# Patient Record
Sex: FEMALE | Race: WHITE | NOT HISPANIC OR LATINO | Employment: FULL TIME | URBAN - METROPOLITAN AREA
[De-identification: names, ages, dates, MRNs, and addresses within clinical notes are randomized per-mention and may not be internally consistent; named-entity substitution may affect disease eponyms.]

---

## 2017-01-03 ENCOUNTER — OFFICE VISIT (OUTPATIENT)
Dept: OBSTETRICS AND GYNECOLOGY | Facility: CLINIC | Age: 37
End: 2017-01-03
Payer: COMMERCIAL

## 2017-01-03 ENCOUNTER — HOSPITAL ENCOUNTER (OUTPATIENT)
Dept: RADIOLOGY | Facility: HOSPITAL | Age: 37
Discharge: HOME | End: 2017-01-03
Attending: PSYCHIATRY & NEUROLOGY
Payer: COMMERCIAL

## 2017-01-03 VITALS
BODY MASS INDEX: 32.19 KG/M2 | DIASTOLIC BLOOD PRESSURE: 68 MMHG | WEIGHT: 224.88 LBS | SYSTOLIC BLOOD PRESSURE: 118 MMHG | HEIGHT: 70 IN

## 2017-01-03 DIAGNOSIS — Z86.69 HISTORY OF EPILEPSY: ICD-10-CM

## 2017-01-03 DIAGNOSIS — Z01.419 VISIT FOR GYNECOLOGIC EXAMINATION: Primary | ICD-10-CM

## 2017-01-03 PROCEDURE — 88175 CYTOPATH C/V AUTO FLUID REDO: CPT | Performed by: PATHOLOGY

## 2017-01-03 PROCEDURE — 70551 MRI BRAIN STEM W/O DYE: CPT | Mod: TC

## 2017-01-03 PROCEDURE — 99999 PR PBB SHADOW E&M-EST. PATIENT-LVL III: CPT | Mod: PBBFAC,,, | Performed by: NURSE PRACTITIONER

## 2017-01-03 PROCEDURE — 88141 CYTOPATH C/V INTERPRET: CPT | Mod: ,,, | Performed by: PATHOLOGY

## 2017-01-03 PROCEDURE — 70551 MRI BRAIN STEM W/O DYE: CPT | Mod: 26,,, | Performed by: RADIOLOGY

## 2017-01-03 PROCEDURE — 99385 PREV VISIT NEW AGE 18-39: CPT | Mod: S$GLB,,, | Performed by: NURSE PRACTITIONER

## 2017-01-03 NOTE — LETTER
January 3, 2017      Stefany Worley MD  [address]  [facility] [address]           [facility] 5th Floor  [address]  [facility] [address]  Phone: [phone]          Patient: Ruma Guillen   MR Number: [id number]   YOB: 1980   Date of Visit: 1/3/2017       Dear Dr. Stefany Worley:    Thank you for referring Ruma Guillen to me for evaluation. Attached you will find relevant portions of my assessment and plan of care.    If you have questions, please do not hesitate to call me. I look forward to following Ruma Guillen along with you.    Sincerely,    RADHIKA Trivedi, QUINN    Enclosure  CC:  No Recipients    If you would like to receive this communication electronically, please contact [email or web address] or [phone] to request more information on [facility] Link access.    For providers and/or their staff who would like to refer a patient to [facility], please contact us through our one-stop-shop provider referral line, Marilyn Dasilva, at [phone].    If you feel you have received this communication in error or would no longer like to receive these types of communications, please e-mail [email or web address]

## 2017-01-03 NOTE — PROGRESS NOTES
"HISTORY OF PRESENT ILLNESS:    Ruma Guillen is a 36 y.o. female, P0., Patient's last menstrual period was 12/24/2016 (exact date).,  presents for a routine exam and has no gyn complaints.  -Had lost 65#, eating healthy, going to the gym, but over last year has been stressed with 's pancreas-kidney transplant, deaths in family, job stress.   -States goal for 2017 to get back into healthy habits again.   -Not using any contraception, but "doubts can get pregnant due to 's medical conditions"; would be OK if she did.     Past Medical History   Diagnosis Date    Anxiety     Headache, migraine     KASSIDY (iron deficiency anemia)     Seizures      childhood seizures - last seizure >28 yrs.       Past Surgical History   Procedure Laterality Date    [place] tooth extraction         MEDICATIONS AND ALLERGIES:    Current Outpatient Prescriptions:     aspirin-acetaminophen-caffeine 250-250-65 mg (EXCEDRIN MIGRAINE) 250-250-65 mg per tablet, Take 1 tablet by mouth every 6 (six) hours as needed for Pain., Disp: , Rfl:     cetirizine (ZYRTEC) 10 MG tablet, Take 1 tablet (10 mg total) by mouth once daily., Disp: , Rfl: 0    citalopram (CELEXA) 20 MG tablet, Take 1 tablet (20 mg total) by mouth once daily. Take 20mg tabs and 10mg tabs daily., Disp: 90 tablet, Rfl: 3    eszopiclone (LUNESTA) 1 MG Tab, Take 1 tablet (1 mg total) by mouth nightly as needed., Disp: 30 tablet, Rfl: 0    ferrous sulfate 325 mg (65 mg iron) Tab tablet, Take 1 tablet (325 mg total) by mouth once daily., Disp: 90 tablet, Rfl: 3    meclizine (ANTIVERT) 25 mg tablet, Take 1 tablet (25 mg total) by mouth 3 (three) times daily as needed for Dizziness., Disp: 21 tablet, Rfl: 0    Review of patient's allergies indicates:  No Known Allergies    Family History   Problem Relation Age of Onset    Arthritis Mother     Irritable bowel syndrome Mother     Heart disease Maternal Grandfather     Breast cancer Neg Hx     Colon cancer Neg Hx     " "Ovarian cancer Neg Hx        Social History     Social History    Marital status:      Spouse name: N/A    Number of children: N/A    Years of education: N/A     Occupational History          checks food safety - pesticides, medical marijuana     Social History Main Topics    Smoking status: Former Smoker    Smokeless tobacco: Not on file      Comment: monthly - 2-3 cigarettes    Alcohol use Yes      Comment: occasionally    Drug use: No    Sexual activity: Yes     Birth control/ protection: None     Other Topics Concern    Not on file     Social History Narrative     -  with pancreas - kidney transplant 2016       OB HISTORY: None.     COMPREHENSIVE GYN HISTORY:  PAP History: Denies abnormal Paps. 2013  Infection History: Denies STDs. Denies PID.  Benign History: Denies uterine fibroids. Denies ovarian cysts. Denies endometriosis. Denies other conditions.  Cancer History: Denies cervical cancer. Denies uterine cancer or hyperplasia. Denies ovarian cancer. Denies vulvar cancer or pre-cancer. Denies vaginal cancer or pre-cancer. Denies breast cancer. Denies colon cancer.  Sexual Activity History: Reports currently being sexually active  Menstrual History: Monthly. Mod then light flow.   Dysmenorrhea History: Reports mild dysmenorrhea.   Contraception: None. SEE HPI.     ROS:  GENERAL: + WT GAIN. No swelling. No fatigue. No fever.  CARDIOVASCULAR: No chest pain. No shortness of breath. No leg cramps.   NEUROLOGICAL: No headaches. No vision changes.  BREASTS: No pain. No lumps. No discharge.  ABDOMEN: No pain. No nausea. No vomiting. No diarrhea. No constipation.  REPRODUCTIVE: No abnormal bleeding.   VULVA: No pain. No lesions. No itching.  VAGINA: No relaxation. No itching. No odor. No discharge. No lesions.  URINARY: No incontinence. No nocturia. No frequency. No dysuria.    Visit Vitals    /68    Ht 5' 10" (1.778 m)    Wt 102 kg (224 lb 13.9 oz)    LMP 12/24/2016 " (Exact Date)    BMI 32.27 kg/m2       PE:  APPEARANCE: Well nourished, well developed, in no acute distress.  AFFECT: WNL, alert and oriented x 3.  SKIN: No acne or hirsutism.  NECK: Neck symmetric, without masses or thyromegaly.  NODES: No inguinal, cervical, axillary or femoral lymph node enlargement.  CHEST: Good respiratory effort.   ABDOMEN: OBESE. Soft. No tenderness or masses. No hepatosplenomegaly. No hernias.  BREASTS: Symmetrical, no skin changes, visible lesions, palpable masses or nipple discharge bilaterally.  PELVIC: External female genitalia without lesions.  Female hair distribution. Adequate perineal body, Normal urethral meatus. Vagina moist and well rugated without lesions or discharge.  No significant cystocele or rectocele present. Cervix pink without lesions, discharge or tenderness. Uterus is 4-6 week size, regular, mobile and nontender. Adnexa without masses or tenderness. EXAM DIFFICULT DUE TO BODY HABITUS.  EXTREMITIES: No edema    DIAGNOSIS:  1. Visit for gynecologic examination        PLAN:    Orders Placed This Encounter    Liquid-based pap smear, screening   Declined STD tests    COUNSELING:  The patient was counseled today on:  -to start PNV with folic acid;  -A.C.S. Pap and pelvic exam guidelines (pap every 3 years);  -to follow up with her PCP for other health maintenance.    FOLLOW-UP with me annually.

## 2017-01-19 ENCOUNTER — HOSPITAL ENCOUNTER (OUTPATIENT)
Dept: NEUROLOGY | Facility: CLINIC | Age: 37
Discharge: HOME | End: 2017-01-19
Payer: COMMERCIAL

## 2017-01-19 DIAGNOSIS — Z86.69 HISTORY OF EPILEPSY: ICD-10-CM

## 2017-01-19 PROCEDURE — 95813 EEG EXTND MNTR 61-119 MIN: CPT | Mod: S$GLB,,, | Performed by: PSYCHIATRY & NEUROLOGY

## 2017-01-19 PROCEDURE — 95813 PR EEG, EXTENDED, 61-119 MINS: ICD-10-PCS | Mod: S$GLB,,, | Performed by: PSYCHIATRY & NEUROLOGY

## 2017-01-19 NOTE — PROCEDURES
DATE OF SERVICE:  01/19/2017    ELECTROENCEPHALOGRAM REPORT  Extended Recording    METHODOLOGY:  Electroencephalographic (EEG) is recorded with electrodes placed   according to the International 10-20 placement system.  Thirty two (32) channels   of digital signal (sampling rate of 512/sec), including T1 and T2, were   simultaneously recorded from the scalp and may include EKG, EMG, and/or eye   monitors.  Recording band pass was 0.1 to 512 Hz.  Digital video recording of   the patient is simultaneously recorded with the EEG.  The patient is instructed   to report clinical symptoms which may occur during the recording session.  EEG   and video recording are stored and archived in digital format.  Activation   procedures, which include photic stimulation, hyperventilation and instructing   patients to perform simple tasks, are done in selected patients.    The EEG is displayed on a monitor screen and can be reviewed using different   montages.  Computer-assisted analysis is employed to detect spike and   electrographic seizure activity.  The entire record is submitted for computer   analysis.  The entire recording is visually reviewed, and the times identified   by computer analysis as being spikes or seizures are reviewed again.    Compressed spectral analysis (CSA) is also performed on the activity recorded   from each individual channel.  This is displayed as a power display of   frequencies from 0 to 30 Hz over time.  The CSA is reviewed looking for   asymmetries in power between homologous areas of the scalp, then compared with   the original EEG recording.    ABPathfinder software was also utilized in the review of this study.  This software   suite analyzes the EEG recording in multiple domains.  Coherence and rhythmicity   are computed to identify EEG sections which may contain organized seizures.    Each channel undergoes analysis to detect the presence of spike and sharp waves   which have special and  morphological characteristics of epileptic activity.  The   routine EEG recording is converted from special into frequency domain.  This is   then displayed comparing homologous areas to identify areas of significant   asymmetry.  Algorithm to identify non-cortically generated artifact is used to   separate artifact from the EEG.    RECORDING TIMES:  The duration of the study is 1 hour 1 minute.    EEG FINDINGS:  The background of this study at onset contains a well-formed 11   Hz posterior dominant rhythm seen best in the occipital channels along with a   mix of beta activity seen both anteriorly and posteriorly.  The patient is awake   initially and photic stimulation is performed revealing no noticeable driving   response.  Hyperventilation is then performed, which reveals mild physiologic   slowing only.  The record then continues and the patient gradually becomes   drowsy with the emergence of a few sleep spindles signifying brief stage N2   sleep before the patient arouses.  The patient then waxes and wanes in and out   of light sleep for the next several minutes and several minutes of stage N2   sleep are captured.    The patient remains asleep for several minutes before arousing in the final   quarter of the EEG with the return to the prior baseline of a mix of alpha and   beta activity in the posterior dominant rhythm.    No epileptiform discharges were seen during this study.  No electrographic   seizures were seen.  No focal findings or asymmetries were seen.  No events were   flagged on the accompanying video.    INTERPRETATION:  Normal awake and asleep extended routine EEG.      NBB/HN  dd: 01/19/2017 12:00:39 (CST)  td: 01/19/2017 12:17:40 (CST)  Doc ID   #[id number]  Job ID #[id number]    CC:

## 2017-02-23 ENCOUNTER — OFFICE VISIT (OUTPATIENT)
Dept: NEUROLOGY | Facility: CLINIC | Age: 37
End: 2017-02-23
Payer: COMMERCIAL

## 2017-02-23 VITALS
HEART RATE: 60 BPM | SYSTOLIC BLOOD PRESSURE: 105 MMHG | HEIGHT: 70 IN | DIASTOLIC BLOOD PRESSURE: 69 MMHG | BODY MASS INDEX: 30.99 KG/M2 | WEIGHT: 216.5 LBS

## 2017-02-23 DIAGNOSIS — G43.C0 PERIODIC HEADACHE SYNDROME, NOT INTRACTABLE: Primary | ICD-10-CM

## 2017-02-23 PROCEDURE — 99213 OFFICE O/P EST LOW 20 MIN: CPT | Mod: S$GLB,,, | Performed by: PSYCHIATRY & NEUROLOGY

## 2017-02-23 PROCEDURE — 1160F RVW MEDS BY RX/DR IN RCRD: CPT | Mod: S$GLB,,, | Performed by: PSYCHIATRY & NEUROLOGY

## 2017-02-23 PROCEDURE — 99999 PR PBB SHADOW E&M-EST. PATIENT-LVL III: CPT | Mod: PBBFAC,,, | Performed by: PSYCHIATRY & NEUROLOGY

## 2017-02-23 NOTE — LETTER
February 23, 2017      Stefany Worley MD  [address]  [facility] [address]           [facility]  [address]  [facility] [address]  Phone: [phone]  Fax: [phone]          Patient: Ruma Guillen   MR Number: [id number]   YOB: 1980   Date of Visit: 2/23/2017       Dear Dr. Stefany Worley:    Thank you for referring Ruma Guillen to me for evaluation. Attached you will find relevant portions of my assessment and plan of care.    If you have questions, please do not hesitate to call me. I look forward to following Ruma Guillen along with you.    Sincerely,    Wilbur Floyd MD    Enclosure  CC:  No Recipients    If you would like to receive this communication electronically, please contact [email or web address] or [phone] to request more information on [employer] Link access.    For providers and/or their staff who would like to refer a patient to [facility], please contact us through our one-stop-shop provider referral line, [facility], at [phone].    If you feel you have received this communication in error or would no longer like to receive these types of communications, please e-mail [email or web address]

## 2017-02-23 NOTE — MR AVS SNAPSHOT
"    [facility] - Neurology  [address]  [facility] [address]  Phone: [phone]  Fax: [phone]                  Ruma Guillen   2017 9:00 AM   Office Visit    Description:  Female : 1980   Provider:  Wilbur Floyd MD   Department:  [facility]           Reason for Visit     Follow-up           Diagnoses this Visit        Comments    Periodic headache syndrome, not intractable    -  Primary            To Do List           Future Appointments        Provider Department Dept Phone    3/30/2017 8:40 AM Stefany Worley MD [facility] - Internal Medicine [phone]      Goals (5 Years of Data)     None      [facility]     [facility] On [facility] Nurse Care Line -  Assistance  Registered nurses in the [facility] On Call Center provide clinical advisement, health education, appointment booking, and other advisory services.  Call for this free service at [phone].             Medications                Verify that the below list of medications is an accurate representation of the medications you are currently taking.  If none reported, the list may be blank. If incorrect, please contact your healthcare provider. Carry this list with you in case of emergency.           Current Medications     aspirin-acetaminophen-caffeine 250-250-65 mg (EXCEDRIN MIGRAINE) 250-250-65 mg per tablet Take 1 tablet by mouth every 6 (six) hours as needed for Pain.    cetirizine (ZYRTEC) 10 MG tablet Take 1 tablet (10 mg total) by mouth once daily.    citalopram (CELEXA) 20 MG tablet Take 1 tablet (20 mg total) by mouth once daily. Take 20mg tabs and 10mg tabs daily.    ferrous sulfate 325 mg (65 mg iron) Tab tablet Take 1 tablet (325 mg total) by mouth once daily.    meclizine (ANTIVERT) 25 mg tablet Take 1 tablet (25 mg total) by mouth 3 (three) times daily as needed for Dizziness.           Clinical Reference Information           Your Vitals Were     BP Pulse Height Weight BMI    105/69 60 5' 10" (1.778 m) " 98.2 kg (216 lb 7.9 oz) 31.06 kg/m2      Blood Pressure          Most Recent Value    BP  105/69      Allergies as of 2/23/2017     No Known Allergies      Immunizations Administered on Date of Encounter - 2/23/2017     None      Language Assistance Services     ATTENTION: Language assistance services are available, free of charge. Please call [phone].      ATENCIÓN: Si habla español, tiene a zapien disposición servicios gratuitos de asistencia lingüística. Llame al [phone].     CHÚ Ý: N?u b?n nói Ti?ng Vi?t, có các d?ch v? h? tr? ngôn ng? mi?n phí dành cho b?n. G?i s? [phone].         [facility] complies with applicable Federal civil rights laws and does not discriminate on the basis of race, color, national origin, age, disability, or sex.

## 2017-02-23 NOTE — PROGRESS NOTES
Interval History (2/23/17):  Patient reports that she has been doing well since she was last seen. In December, she was routinely waking up at 2 am and was not able to go back to sleep until 5; she reports that she has been sleeping much better lately. She tried taking the Melatonin once or twice, but was worried it would cause her to sleep through her morning alarm, so she stopped taking it. She has begun an exercise regimen in the evenings which helps her get to sleep. Her  is doing well after his transplant and the stress in her life is well controlled at this point. She has not had any episodes of dizziness/vertigo since she was last evaluated. She also has not had the previously mentioned lightheadedness or foggy thinking for the past month. She has a mild headache once per week and a migraine once every 3-6 months. She has transient vision changes as part of her aura with her migraines; this entails decreased peripheral vision and a flashing light in her right eye. Headaches are associated with nausea, photophobia, and phonophobia. Excedrin PRN continues to alleviate her migraine symptoms.    Patient denies any changes to her past medical or surgical history, family history, social history, allergies, or home medications.    HPI (12/20/2016):   36 year old woman with history of childhood epilepsy presents for vertigo. She reports that when she was [age] years old she slipped and hit her head on a sink prompting neurologic work up that showed petit mal seizures. She had staring spells for about two years which were not treated with any medication. She began to have migraines later in childhood around 12 years of age but these have become much less frequent in adulthood. She estimates they now occur about once every two months with good response to excedrine migraine. Some of these are accompanied by about 10 minutes of right sided face/hand numbness and right visual deficit prior to headache.     Over  the past year she has had occasional bouts of vertigo (room spinning) with nausea on awakening in the morning - happens about once every two months. She has more frequent prolonged feeling of lightheadedness and foggy thinking throughout the day which troubles her as she has a cognitively demanding job as a . She does have significant recent stressor of  receiving organ transplant 7/2016 but reports that his health is generally going well and she does not feel stressed. She does endorse trouble maintaining sleep and was recently prescribed lunesta by PCP but has not tried this yet.     Review of Systems   Constitutional: Negative for chills, fever and weight loss.   HENT: Positive for tinnitus. Negative for congestion, hearing loss and sore throat.    Eyes: Negative for blurred vision and double vision.   Respiratory: Negative for cough, shortness of breath and wheezing.    Cardiovascular: Negative for chest pain, palpitations and leg swelling.   Gastrointestinal: Negative for abdominal pain, blood in stool, constipation, diarrhea, nausea and vomiting.   Genitourinary: Negative for dysuria and hematuria.   Musculoskeletal: Negative for back pain, joint pain and neck pain.   Skin: Negative for rash.   Neurological: Negative for sensory change, speech change, focal weakness, seizures, loss of consciousness and headaches (one every 3-6 months).   Endo/Heme/Allergies: Does not bruise/bleed easily.   Psychiatric/Behavioral: Negative for depression, memory loss and substance abuse. The patient is nervous/anxious. The patient does not have insomnia.      Objective:   Neurologic Exam      Mental Status   Oriented to person, place, and time.   Level of consciousness: alert     Cranial Nerves      CN II   Visual fields intact to finger count in all four quadrants bilaterally.     CN III, IV, VI   Pupils are equal, round, and reactive to light.  Extraocular motions are normal.      CN V   Facial sensation intact  bilaterally.      CN VII   Facial expression full, symmetric.      CN IX, X   Palate: symmetric     CN XI   Right trapezius strength: normal  Left trapezius strength: normal  Head turn 5/5 bilaterally.     CN XII   Tongue: not atrophic, midline     Motor Exam   Muscle bulk: normal  Overall muscle tone: normal     Strength   Right deltoid: 5/5  Left deltoid: 5/5  Right biceps: 5/5  Left biceps: 5/5  Right triceps: 5/5  Left triceps: 5/5  Right interossei: 5/5  Left interossei: 5/5  Right iliopsoas: 5/5  Left iliopsoas: 5/5  Right quadriceps: 5/5  Left quadriceps: 5/5  Right hamstrin/5  Left hamstrin/5  Right anterior tibial: 5/5  Left anterior tibial: 5/5  Right gastroc: 5/5  Left gastroc: 5/5     Sensory Exam   Light touch normal throughout bilateral upper and lower extremities.  Vibration normal throughout bilateral upper and lower extremities.  Temperature normal throughout bilateral upper and lower extremities.     Gait, Coordination, and Reflexes   Gait: Normal. Able to walk on heels and toes without difficulty.     Coordination   Romberg: negative  Finger to nose coordination: normal  Heel to shin coordination: normal  Tandem walking coordination: normal     Tremor   Resting tremor: absent     Reflexes   Right brachioradialis: 2+  Left brachioradialis: 2+  Right biceps: 2+  Left biceps: 2+  Right triceps: 2+  Left triceps: 2+  Right patellar: 2+  Left patellar: 2+  Right achilles: 2+  Left achilles: 2+  Rogers's not present bilaterally. Toes are down-going bilaterally.      Physical Exam   Vitals:    17 0900   BP: 105/69   Pulse: 60     Constitutional: She is oriented to person, place, and time. She appears well-developed and well-nourished.   HENT: Normocephalic and atraumatic. EOM are normal. Pupils are equal, round, and reactive to light. Mucous membranes moist.  Cardiovascular: Normal rate and regular rhythm.  Pulmonary/Chest: Effort normal. Clear to auscultation bilaterally.  Abdominal:  Soft. Non-tender, non-distended.  Musculoskeletal: Normal range of motion.   Extremities: No cyanosis, edema. Extremities are well perfused.  Skin: Skin is warm and dry.   Psychiatric: She has a normal mood and affect.      Assessment:      36 year old  female with a history of childhood epilepsy and migraines who presents in follow up for vertigo and concerns about recurrence of childhood epilepsy.      Plan:      -MRI brain epilepsy protocol was within normal limits, as was extended EEG.  -Given symptoms of hemiplegic migraine, best to avoid triptans for abortive therapy, continue Excedrin migraine.  -Patient reports that lightheadedness and dizziness have resolved.   -Continue exercise regimen. Melatonin for insomnia as needed.  -Return to clinic on an as needed basis.    Patient was seen and discussed with Dr. Floyd.    Sara Young MD  Neurology, PGY-II

## 2017-03-02 ENCOUNTER — PATIENT MESSAGE (OUTPATIENT)
Dept: INTERNAL MEDICINE | Facility: CLINIC | Age: 37
End: 2017-03-02

## 2017-03-02 RX ORDER — ALPRAZOLAM 0.25 MG/1
0.25 TABLET ORAL 2 TIMES DAILY PRN
Qty: 8 TABLET | Refills: 0 | Status: SHIPPED | OUTPATIENT
Start: 2017-03-02 | End: 2017-05-25 | Stop reason: SDUPTHER

## 2017-05-25 ENCOUNTER — OFFICE VISIT (OUTPATIENT)
Dept: INTERNAL MEDICINE | Facility: CLINIC | Age: 37
End: 2017-05-25
Payer: COMMERCIAL

## 2017-05-25 VITALS
DIASTOLIC BLOOD PRESSURE: 64 MMHG | TEMPERATURE: 99 F | SYSTOLIC BLOOD PRESSURE: 104 MMHG | RESPIRATION RATE: 17 BRPM | HEIGHT: 70 IN | BODY MASS INDEX: 30.78 KG/M2 | WEIGHT: 215 LBS | HEART RATE: 61 BPM

## 2017-05-25 DIAGNOSIS — F41.9 ANXIETY: Primary | ICD-10-CM

## 2017-05-25 DIAGNOSIS — D50.9 IRON DEFICIENCY ANEMIA, UNSPECIFIED IRON DEFICIENCY ANEMIA TYPE: ICD-10-CM

## 2017-05-25 PROCEDURE — 99999 PR PBB SHADOW E&M-EST. PATIENT-LVL III: CPT | Mod: PBBFAC,,, | Performed by: INTERNAL MEDICINE

## 2017-05-25 PROCEDURE — 99214 OFFICE O/P EST MOD 30 MIN: CPT | Mod: S$GLB,,, | Performed by: INTERNAL MEDICINE

## 2017-05-25 RX ORDER — ALPRAZOLAM 0.25 MG/1
0.25 TABLET ORAL 2 TIMES DAILY PRN
Qty: 12 TABLET | Refills: 1 | Status: SHIPPED | OUTPATIENT
Start: 2017-05-25 | End: 2017-05-25 | Stop reason: SDUPTHER

## 2017-05-25 RX ORDER — ALPRAZOLAM 0.25 MG/1
0.25 TABLET ORAL 2 TIMES DAILY PRN
Qty: 30 TABLET | Refills: 1 | Status: SHIPPED | OUTPATIENT
Start: 2017-05-25 | End: 2018-01-11 | Stop reason: SDUPTHER

## 2017-05-25 RX ORDER — CITALOPRAM 20 MG/1
20 TABLET, FILM COATED ORAL DAILY
Qty: 90 TABLET | Refills: 3
Start: 2017-05-25 | End: 2018-01-11 | Stop reason: SDUPTHER

## 2017-05-25 NOTE — PROGRESS NOTES
"Subjective:       Patient ID: Ruma Guillen is a 36 y.o. female.    Chief Complaint: follow up for anxiety and amnesia    HPI   Dizziness resolved. Seen neurology - neg EEG and MRI. Recommended Excedrin migraine and no triptans.   HAs attributed to sinuses. Takes tylenol and zyrtec daily. No sinus congestion/rhinorrhea/sore throat/fevers/chills currently. Excedrin migraine works well for her HA.    Anxiety - on celexa 20mg daily (no improvement between 20mg and 30mg so on 20mg) and prn xanax. Reports still sometimes w/ panic attacks - not necessarily w/ associated reason; happened once in a meeting. Xanax works for panic attacks.     Had GE yesterday. No longer w/ diarrhea/vomiting/nausea. Tolerating PO.     Going to [employer] soon.    Review of Systems  as above in HPI.     Objective:      Physical Exam    /64   Pulse 61   Temp 98.6 °F (37 °C) (Oral)   Resp 17   Ht 5' 10" (1.778 m)   Wt 97.5 kg (215 lb)   LMP 05/05/2017 (Approximate)   BMI 30.85 kg/m²     GEN - A+OX4, NAD   HEENT - PERRL, EOMI, OP clear. MMM.   Neck - No thyromegaly or cervical LAD. No thyroid masses felt.  CV - RRR, no m/r   Chest - CTAB, no wheezing or rhonchi  Abd - S/NT/ND/+BS.   Ext - 2+BDP and radial pulses. No LE edema.   Neuro - 5/5 BUE and BLE strength. 2+ DTRs.  Skin - No rash.    MRI BRAIN 1/3/17  Findings:      The craniocervical junction is within normal limits.  The sella and parasellar structures are unremarkable.  The midline structures are within normal limits.  The orbits and intraorbital contents are within normal limits.  The paranasal sinuses and mastoid air cells are clear.    No diffusion-weighted signal abnormality is present.  There is no focal parenchymal signal abnormality.  The hippocampi formations are within normal limits.  There is no evidence of heterotopia.  There is no evidence of midline shift.  There are no extra-axial fluid collections.  There is no evidence of intracranial hemorrhage. "   Impression   Unremarkable MRI of the brain using epilepsy protocol.       Assessment/Plan     Ruma was seen today for anxiety, insomnia, dizziness and seizures.    Diagnoses and all orders for this visit:    Anxiety  -     Comprehensive metabolic panel; Future  -     citalopram (CELEXA) 20 MG tablet; Take 1 tablet (20 mg total) by mouth once daily.  -     alprazolam (XANAX) 0.25 MG tablet; Take 1 tablet (0.25 mg total) by mouth 2 (two) times daily as needed for Anxiety.    Iron deficiency anemia, unspecified iron deficiency anemia type  -     CBC auto differential; Future  -     Ferritin; Future  -     Iron and TIBC; Future    RTC in 6 mo, sooner if needed.     Stefany Worley MD  Department of Internal Medicine - [facility]  7:43 AM

## 2017-07-10 RX ORDER — FERROUS SULFATE 325(65) MG
325 TABLET ORAL DAILY
Qty: 90 TABLET | Refills: 3 | Status: SHIPPED | OUTPATIENT
Start: 2017-07-10 | End: 2018-01-11

## 2017-08-14 DIAGNOSIS — F41.9 ANXIETY: ICD-10-CM

## 2017-08-14 RX ORDER — CITALOPRAM 20 MG/1
TABLET, FILM COATED ORAL
Qty: 90 TABLET | Refills: 1 | Status: SHIPPED | OUTPATIENT
Start: 2017-08-14 | End: 2018-01-11 | Stop reason: SDUPTHER

## 2018-01-11 ENCOUNTER — OFFICE VISIT (OUTPATIENT)
Dept: INTERNAL MEDICINE | Facility: CLINIC | Age: 38
End: 2018-01-11
Payer: COMMERCIAL

## 2018-01-11 VITALS
SYSTOLIC BLOOD PRESSURE: 122 MMHG | WEIGHT: 224.5 LBS | BODY MASS INDEX: 32.14 KG/M2 | DIASTOLIC BLOOD PRESSURE: 78 MMHG | RESPIRATION RATE: 18 BRPM | HEART RATE: 60 BPM | HEIGHT: 70 IN | TEMPERATURE: 99 F

## 2018-01-11 DIAGNOSIS — R41.840 ATTENTION DEFICIT: ICD-10-CM

## 2018-01-11 DIAGNOSIS — F41.9 ANXIETY: Primary | ICD-10-CM

## 2018-01-11 PROCEDURE — 99213 OFFICE O/P EST LOW 20 MIN: CPT | Mod: S$GLB,,, | Performed by: INTERNAL MEDICINE

## 2018-01-11 PROCEDURE — 99999 PR PBB SHADOW E&M-EST. PATIENT-LVL III: CPT | Mod: PBBFAC,,, | Performed by: INTERNAL MEDICINE

## 2018-01-11 RX ORDER — BUPROPION HYDROCHLORIDE 75 MG/1
TABLET ORAL
Qty: 60 TABLET | Refills: 2 | Status: SHIPPED | OUTPATIENT
Start: 2018-01-11 | End: 2018-04-06 | Stop reason: SDUPTHER

## 2018-01-11 RX ORDER — CITALOPRAM 20 MG/1
20 TABLET, FILM COATED ORAL DAILY
Qty: 90 TABLET | Refills: 3 | Status: SHIPPED | OUTPATIENT
Start: 2018-01-11 | End: 2019-02-22 | Stop reason: SDUPTHER

## 2018-01-11 RX ORDER — ALPRAZOLAM 0.25 MG/1
0.25 TABLET ORAL 2 TIMES DAILY PRN
Qty: 30 TABLET | Refills: 1 | Status: SHIPPED | OUTPATIENT
Start: 2018-01-11 | End: 2019-02-22 | Stop reason: SDUPTHER

## 2018-01-11 NOTE — PROGRESS NOTES
"Subjective:       Patient ID: Ruma Guillen is a 37 y.o. female.    Chief Complaint: f/u for anxiety    HPI   Anxiety - on celexa 20mg daily and xanax prn.   Reports mood is doing fine on current dosage. Possibly new job in [place].     Reports sometimes have trouble focusing - she'll go off on tangents in mid conversations. Reports always had trouble focusing. No trouble w/ grades in college or high school as she didn't have to study much. Did have to switch from biology to chemistry b/c she was not able to focus w/ biology when reading - has to read a paragraph three times. Never been diagnosed w/ ADD. Brother w/ ADD.    Had flu vaccine through [facility] in Oct.     Review of Systems  Comprehensive review of systems otherwise negative. See history/subjective section for more details.    Objective:      Physical Exam    /78   Pulse 60   Temp 99 °F (37.2 °C) (Oral)   Resp 18   Ht 5' 10" (1.778 m)   Wt 101.8 kg (224 lb 8 oz)   LMP 01/05/2018   BMI 32.21 kg/m²     GEN - A+OX4, NAD   HEENT - PERRL, EOMI, OP clear  Neck - No thyromegaly or cervical LAD. No thyroid masses felt.  CV - RRR, no m/r   Chest - CTAB, no wheezing or rhonchi  Abd - S/NT/ND/+BS.   Ext - 2+BDP and radial pulses. No le EDEMA.  Skin - No rash.    Assessment/Plan     Diagnoses and all orders for this visit:    Anxiety  -     citalopram (CELEXA) 20 MG tablet; Take 1 tablet (20 mg total) by mouth once daily.  -     ALPRAZolam (XANAX) 0.25 MG tablet; Take 1 tablet (0.25 mg total) by mouth 2 (two) times daily as needed for Anxiety.  -     buPROPion (WELLBUTRIN) 75 MG tablet; Take 1/2 tablet twice daily for a week and then take 1 tablet twice daily onwards.    Attention deficit - Discussed w/ pt that dx of ADD would require psychiatry eval. Pt reports would be ok w/ just trying wellbutrin to see if it helps. Portal message me in 6-8 weeks and let me know and also if any issues.  -     buPROPion (WELLBUTRIN) 75 MG tablet; Take 1/2 tablet twice daily for a " week and then take 1 tablet twice daily onwards.    Possibly moving to [place] in March or April.  Follow-up if symptoms worsen or fail to improve.      Stefany Worley MD  Department of Internal Medicine - [facility]  9:02 AM

## 2018-04-06 DIAGNOSIS — R41.840 ATTENTION DEFICIT: ICD-10-CM

## 2018-04-06 DIAGNOSIS — F41.9 ANXIETY: ICD-10-CM

## 2018-04-06 RX ORDER — BUPROPION HYDROCHLORIDE 75 MG/1
TABLET ORAL
Qty: 60 TABLET | Refills: 2 | Status: SHIPPED | OUTPATIENT
Start: 2018-04-06 | End: 2018-07-04 | Stop reason: SDUPTHER

## 2018-07-04 DIAGNOSIS — R41.840 ATTENTION DEFICIT: ICD-10-CM

## 2018-07-04 DIAGNOSIS — F41.9 ANXIETY: ICD-10-CM

## 2018-07-05 RX ORDER — BUPROPION HYDROCHLORIDE 75 MG/1
TABLET ORAL
Qty: 60 TABLET | Refills: 2 | Status: SHIPPED | OUTPATIENT
Start: 2018-07-05 | End: 2019-02-22

## 2019-02-22 ENCOUNTER — LAB VISIT (OUTPATIENT)
Dept: LAB | Facility: HOSPITAL | Age: 39
End: 2019-02-22
Attending: INTERNAL MEDICINE
Payer: COMMERCIAL

## 2019-02-22 ENCOUNTER — OFFICE VISIT (OUTPATIENT)
Dept: INTERNAL MEDICINE | Facility: CLINIC | Age: 39
End: 2019-02-22
Payer: COMMERCIAL

## 2019-02-22 VITALS
HEART RATE: 67 BPM | SYSTOLIC BLOOD PRESSURE: 108 MMHG | WEIGHT: 219.81 LBS | BODY MASS INDEX: 31.47 KG/M2 | TEMPERATURE: 98 F | DIASTOLIC BLOOD PRESSURE: 68 MMHG | HEIGHT: 70 IN

## 2019-02-22 DIAGNOSIS — F41.9 ANXIETY: ICD-10-CM

## 2019-02-22 DIAGNOSIS — Z00.00 ANNUAL PHYSICAL EXAM: Primary | ICD-10-CM

## 2019-02-22 DIAGNOSIS — D50.9 IRON DEFICIENCY ANEMIA, UNSPECIFIED IRON DEFICIENCY ANEMIA TYPE: ICD-10-CM

## 2019-02-22 DIAGNOSIS — R41.840 ATTENTION DEFICIT: ICD-10-CM

## 2019-02-22 DIAGNOSIS — Z00.00 ANNUAL PHYSICAL EXAM: ICD-10-CM

## 2019-02-22 LAB
25(OH)D3+25(OH)D2 SERPL-MCNC: 30 NG/ML
ALBUMIN SERPL BCP-MCNC: 4.4 G/DL
ALP SERPL-CCNC: 62 U/L
ALT SERPL W/O P-5'-P-CCNC: 22 U/L
ANION GAP SERPL CALC-SCNC: 7 MMOL/L
AST SERPL-CCNC: 24 U/L
BASOPHILS # BLD AUTO: 0.02 K/UL
BASOPHILS NFR BLD: 0.4 %
BILIRUB SERPL-MCNC: 0.4 MG/DL
BUN SERPL-MCNC: 11 MG/DL
CALCIUM SERPL-MCNC: 9.9 MG/DL
CHLORIDE SERPL-SCNC: 102 MMOL/L
CHOLEST SERPL-MCNC: 214 MG/DL
CHOLEST/HDLC SERPL: 4.1 {RATIO}
CO2 SERPL-SCNC: 28 MMOL/L
CREAT SERPL-MCNC: 0.8 MG/DL
DIFFERENTIAL METHOD: ABNORMAL
EOSINOPHIL # BLD AUTO: 0.1 K/UL
EOSINOPHIL NFR BLD: 2.7 %
ERYTHROCYTE [DISTWIDTH] IN BLOOD BY AUTOMATED COUNT: 12.8 %
EST. GFR  (AFRICAN AMERICAN): >60 ML/MIN/1.73 M^2
EST. GFR  (NON AFRICAN AMERICAN): >60 ML/MIN/1.73 M^2
ESTIMATED AVG GLUCOSE: 94 MG/DL
FERRITIN SERPL-MCNC: 9 NG/ML
GLUCOSE SERPL-MCNC: 86 MG/DL
HBA1C MFR BLD HPLC: 4.9 %
HCT VFR BLD AUTO: 39.6 %
HDLC SERPL-MCNC: 52 MG/DL
HDLC SERPL: 24.3 %
HGB BLD-MCNC: 12.4 G/DL
IRON SERPL-MCNC: 44 UG/DL
LDLC SERPL CALC-MCNC: 148.2 MG/DL
LYMPHOCYTES # BLD AUTO: 1.3 K/UL
LYMPHOCYTES NFR BLD: 27.2 %
MCH RBC QN AUTO: 27.6 PG
MCHC RBC AUTO-ENTMCNC: 31.3 G/DL
MCV RBC AUTO: 88 FL
MONOCYTES # BLD AUTO: 0.4 K/UL
MONOCYTES NFR BLD: 8.4 %
NEUTROPHILS # BLD AUTO: 3 K/UL
NEUTROPHILS NFR BLD: 60.9 %
NONHDLC SERPL-MCNC: 162 MG/DL
PLATELET # BLD AUTO: 238 K/UL
PMV BLD AUTO: 10.7 FL
POTASSIUM SERPL-SCNC: 4.3 MMOL/L
PROT SERPL-MCNC: 7.6 G/DL
RBC # BLD AUTO: 4.49 M/UL
SATURATED IRON: 9 %
SODIUM SERPL-SCNC: 137 MMOL/L
TOTAL IRON BINDING CAPACITY: 475 UG/DL
TRANSFERRIN SERPL-MCNC: 321 MG/DL
TRIGL SERPL-MCNC: 69 MG/DL
TSH SERPL DL<=0.005 MIU/L-ACNC: 0.96 UIU/ML
WBC # BLD AUTO: 4.89 K/UL

## 2019-02-22 PROCEDURE — 83540 ASSAY OF IRON: CPT

## 2019-02-22 PROCEDURE — 99395 PR PREVENTIVE VISIT,EST,18-39: ICD-10-PCS | Mod: S$GLB,,, | Performed by: INTERNAL MEDICINE

## 2019-02-22 PROCEDURE — 99999 PR PBB SHADOW E&M-EST. PATIENT-LVL IV: ICD-10-PCS | Mod: PBBFAC,,, | Performed by: INTERNAL MEDICINE

## 2019-02-22 PROCEDURE — 80061 LIPID PANEL: CPT

## 2019-02-22 PROCEDURE — 83036 HEMOGLOBIN GLYCOSYLATED A1C: CPT

## 2019-02-22 PROCEDURE — 36415 COLL VENOUS BLD VENIPUNCTURE: CPT

## 2019-02-22 PROCEDURE — 82306 VITAMIN D 25 HYDROXY: CPT

## 2019-02-22 PROCEDURE — 80053 COMPREHEN METABOLIC PANEL: CPT

## 2019-02-22 PROCEDURE — 84443 ASSAY THYROID STIM HORMONE: CPT

## 2019-02-22 PROCEDURE — 85025 COMPLETE CBC W/AUTO DIFF WBC: CPT

## 2019-02-22 PROCEDURE — 99395 PREV VISIT EST AGE 18-39: CPT | Mod: S$GLB,,, | Performed by: INTERNAL MEDICINE

## 2019-02-22 PROCEDURE — 99999 PR PBB SHADOW E&M-EST. PATIENT-LVL IV: CPT | Mod: PBBFAC,,, | Performed by: INTERNAL MEDICINE

## 2019-02-22 PROCEDURE — 82728 ASSAY OF FERRITIN: CPT

## 2019-02-22 RX ORDER — ALPRAZOLAM 0.25 MG/1
0.25 TABLET ORAL 2 TIMES DAILY PRN
Qty: 30 TABLET | Refills: 1 | Status: SHIPPED | OUTPATIENT
Start: 2019-02-22 | End: 2019-08-16 | Stop reason: SDUPTHER

## 2019-02-22 RX ORDER — CITALOPRAM 20 MG/1
20 TABLET, FILM COATED ORAL DAILY
Qty: 90 TABLET | Refills: 3 | Status: SHIPPED | OUTPATIENT
Start: 2019-02-22 | End: 2019-12-24 | Stop reason: SDUPTHER

## 2019-02-22 NOTE — PROGRESS NOTES
INTERNAL MEDICINE ANNUAL VISIT NOTE      CHIEF COMPLAINT     Chief Complaint   Patient presents with    Annual Exam     HPI     Ruma Guillen is a 38 y.o. C female who presents for annual exam. LMP 2/12/19. Monthly. Last about 5-6 days. Very heavy for the first day or two - goes through a tampon every 2-3 hours.    Anxiety - celexa 20mg daily, prn xanax (rare use)  Stressful work.   Started working out this week.     Difficulty concentrating - affecting work where she second guesses herself. When she's at conferences or talking to someone, she phases out.     No more dizziness.     Chronic allergies - on zyrtec 10mg daily.     Past Medical History:  Past Medical History:   Diagnosis Date    Anxiety     Headache, migraine     KASSIDY (iron deficiency anemia)     Seizures     childhood seizures - last seizure >28 yrs.       Past Surgical History:  Past Surgical History:   Procedure Laterality Date    WISDOM TOOTH EXTRACTION         Allergies:  Review of patient's allergies indicates:  No Known Allergies    Home Medications:  Prior to Admission medications    Medication Sig Start Date End Date Taking? Authorizing Provider   ALPRAZolam (XANAX) 0.25 MG tablet Take 1 tablet (0.25 mg total) by mouth 2 (two) times daily as needed for Anxiety. 1/11/18 2/22/19 Yes Stefany Worley MD   aspirin-acetaminophen-caffeine 250-250-65 mg (EXCEDRIN MIGRAINE) 250-250-65 mg per tablet Take 1 tablet by mouth every 6 (six) hours as needed for Pain.   Yes Historical Provider, MD   cetirizine (ZYRTEC) 10 MG tablet Take 1 tablet (10 mg total) by mouth once daily. 12/5/16 2/22/19 Yes Stefany Worley MD   citalopram (CELEXA) 20 MG tablet Take 1 tablet (20 mg total) by mouth once daily. 1/11/18  Yes Stefany Worley MD   meclizine (ANTIVERT) 25 mg tablet Take 1 tablet (25 mg total) by mouth 3 (three) times daily as needed for Dizziness. 12/5/16  Yes Stefany Worley MD   buPROPion (WELLBUTRIN) 75 MG tablet TAKE 1/2 TABLET TWICE DAILY FOR A WEEK AND THEN TAKE 1 TABLET TWICE  "DAILY ONWARDS. 7/5/18 2/22/19  Mariaa Metcalf MD       Family History:  Family History   Problem Relation Age of Onset    Arthritis Mother     Irritable bowel syndrome Mother     Heart disease Maternal Grandfather     Breast cancer Neg Hx     Colon cancer Neg Hx     Ovarian cancer Neg Hx        Social History:  Social History     Tobacco Use    Smoking status: Former Smoker    Tobacco comment: monthly - 2-3 cigarettes   Substance Use Topics    Alcohol use: Yes     Comment: occasionally    Drug use: No       Review of Systems:  Review of Systems   Constitutional: Positive for fatigue. Negative for activity change and unexpected weight change.   HENT: Negative for hearing loss, rhinorrhea and trouble swallowing.    Eyes: Negative for discharge and visual disturbance.   Respiratory: Negative for chest tightness and wheezing.    Cardiovascular: Negative for chest pain and palpitations.   Gastrointestinal: Negative for blood in stool, constipation, diarrhea and vomiting.   Endocrine: Negative for polydipsia and polyuria.   Genitourinary: Negative for difficulty urinating, dysuria, hematuria and menstrual problem.   Musculoskeletal: Negative for arthralgias, joint swelling and neck pain.   Neurological: Negative for weakness and headaches.   Psychiatric/Behavioral: Negative for confusion and dysphoric mood.       Health Maintainence:   HM reviewed.    PHYSICAL EXAM     /68 (BP Location: Left arm, Patient Position: Sitting, BP Method: Medium (Manual))   Pulse 67   Temp 98.3 °F (36.8 °C)   Ht 5' 10" (1.778 m)   Wt 99.7 kg (219 lb 12.8 oz)   LMP 02/12/2019   BMI 31.54 kg/m²     GEN - A+OX4, NAD   HEENT - PERRL, EOMI, OP clear. MMM.   Neck - No thyromegaly or cervical LAD. No thyroid masses felt.  CV - RRR, no m/r   Chest - CTAB, no wheezing or rhonchi  Abd - S/NT/ND/+BS.   Ext - 2+BDP and radial pulses. No LE edema.   Neuro - PERRL, EOMI, no nystagmus, eyebrow raise, facial sensation, hearing, m " of mastication, smile, palatal raise, shoulder shrug, tongue protrusion symmetric and intact. 5/5 BUE and BLE strength. Sensation to light touch intact throughout. Decreased DTRs. Normal gait.   MSK - No spinal tenderness to palpation. Normal gait.   Skin - No rash.    LABS     Previous labs reviewed.    ASSESSMENT/PLAN     Ruma Guillen is a 38 y.o. female with  Ruma was seen today for annual exam.    Diagnoses and all orders for this visit:    Annual physical exam  -     CBC auto differential; Future; Expected date: 02/22/2019  -     Comprehensive metabolic panel; Future; Expected date: 02/22/2019  -     Hemoglobin A1c; Future; Expected date: 02/22/2019  -     Lipid panel; Future; Expected date: 02/22/2019  -     TSH; Future; Expected date: 02/22/2019  -     Vitamin D; Future; Expected date: 02/22/2019  -     Ferritin; Future; Expected date: 02/22/2019  -     Iron and TIBC; Future; Expected date: 02/22/2019    Iron deficiency anemia, unspecified iron deficiency anemia type - previously on iron. Will recheck.   -     CBC auto differential; Future; Expected date: 02/22/2019  -     Ferritin; Future; Expected date: 02/22/2019  -     Iron and TIBC; Future; Expected date: 02/22/2019    Anxiety  -     TSH; Future; Expected date: 02/22/2019  -     citalopram (CELEXA) 20 MG tablet; Take 1 tablet (20 mg total) by mouth once daily.  -     ALPRAZolam (XANAX) 0.25 MG tablet; Take 1 tablet (0.25 mg total) by mouth 2 (two) times daily as needed for Anxiety.    Attention deficit  -     Ambulatory Referral to Psychiatry      RTC in 12 months, sooner if needed and depending on labs.    Stefany Worley MD  Department of Internal Medicine - [facility]  8:05 AM

## 2019-05-26 NOTE — PROGRESS NOTES
"Outpatient Psychiatry Initial Visit (MD/NP)    5/27/2019    Ruma Guillen, a 38 y.o. female, presenting for initial evaluation visit. Met with patient.    Reason for Encounter: Referral from Stefany Worley MD. Patient complains of TROUBLE WITH ANXIETY, FOCUS AND ATTENTION.    History of Present Illness:    Pt is a 37-year old female with PMHx of anxiety disorder who presents for ADHD evaluation. Stated, "I've been having trouble concentrating; I can't focus on anything.  I'm a  and I can't get through a paragraph".  Reports symptoms cause dysfunction at work and home. Thought processes are clear and organized.  Denies symptoms of marshall or depression.  Endorses symptoms of anxiety which have improved with Celexa. Takes PRN Xanax for infrequent panic attacks.  Denies SI/HI/AVH.      ADHD Adult:  · Have difficulty sustaining attention in tasks or fun activities? YES  · Don't follow through on instructions and fail to finish work?  YES  · Have difficulty organizing tasks and activities?  YES  · Avoid, dislike, or are reluctant to engage in work thar requires sustained mental effort? YES  · Easily distracted? YES  · Forgetful in daily activities? YES  · Fidget with hands or feet, or squirm in seat? YES  · Have difficulty engaging in leisure activities or doing fun things quietly?  NO  · Feel "on the go" or "driven by a motor"? NO  · Blurt out answers before questions have been completed?  NO  · Have difficulty waiting your turn, are impatient? YES  · Interrupt or intrude on others? 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      Medical History:     Past Medical History: "   Diagnosis Date    Anxiety      Headache, migraine      KASSIDY (iron deficiency anemia)      Seizures       childhood seizures - last seizure >28 yrs.              Past Surgical History:   Procedure Laterality Date    WISDOM TOOTH EXTRACTION         Review Of Systems:     GENERAL:  No weight gain or loss  SKIN:  No rashes or lacerations  HEAD:  No headaches  EYES:  No exophthalmos, jaundice or blindness  EARS:  No dizziness, tinnitus or hearing loss  NOSE:  No changes in smell  MOUTH & THROAT:  No dyskinetic movements or obvious goiter  CHEST:  No shortness of breath, hyperventilation or cough  CARDIOVASCULAR:  No tachycardia or chest pain  ABDOMEN:  No nausea, vomiting, pain, constipation or diarrhea  URINARY:  No frequency, dysuria or sexual dysfunction  ENDOCRINE:  No polydipsia, polyuria  MUSCULOSKELETAL:  No pain or stiffness of the joints  NEUROLOGIC:  No weakness, sensory changes, seizures, confusion, memory loss, tremor or other abnormal movements    Current Evaluation:     Nutritional Screening: Considering the patient's height and weight, medications, medical history and preferences, should a referral be made to the dietitian? no    Constitutional  Vitals:  Most recent vital signs, dated greater than 90 days prior to this appointment, were reviewed.    Vitals:    05/27/19 0803   BP: (!) 115/54   Pulse: 72   Weight: 101.9 kg (224 lb 10.4 oz)        General:  unremarkable, age appropriate     Musculoskeletal  Muscle Strength/Tone:  no tremor, no tic   Gait & Station:  non-ataxic     Psychiatric  Speech:  no latency; no press   Mood & Affect:  euthymic  congruent and appropriate   Thought Process:  normal and logical   Associations:  intact   Thought Content:  normal, no suicidality, no homicidality, delusions, or paranoia   Insight:  intact   Judgement: behavior is adequate to circumstances   Orientation:  grossly intact   Memory: intact for content of interview   Language: grossly intact   Attention Span  & Concentration:  able to focus   Fund of Knowledge:  intact and appropriate to age and level of education       Relevant Elements of Neurological Exam: normal gait    Functioning in Relationships:  Spouse/partner: see above HPI  Peers: see above HPI  Employers: see above HPI    Laboratory Data  No visits with results within 1 Month(s) from this visit.   Latest known visit with results is:   Lab Visit on 02/22/2019   Component Date Value Ref Range Status    WBC 02/22/2019 4.89  3.90 - 12.70 K/uL Final    RBC 02/22/2019 4.49  4.00 - 5.40 M/uL Final    Hemoglobin 02/22/2019 12.4  12.0 - 16.0 g/dL Final    Hematocrit 02/22/2019 39.6  37.0 - 48.5 % Final    Mean Corpuscular Volume 02/22/2019 88  82 - 98 fL Final    Mean Corpuscular Hemoglobin 02/22/2019 27.6  27.0 - 31.0 pg Final    Mean Corpuscular Hemoglobin Conc 02/22/2019 31.3* 32.0 - 36.0 g/dL Final    RDW 02/22/2019 12.8  11.5 - 14.5 % Final    Platelets 02/22/2019 238  150 - 350 K/uL Final    MPV 02/22/2019 10.7  9.2 - 12.9 fL Final    Gran # (ANC) 02/22/2019 3.0  1.8 - 7.7 K/uL Final    Lymph # 02/22/2019 1.3  1.0 - 4.8 K/uL Final    Mono # 02/22/2019 0.4  0.3 - 1.0 K/uL Final    Eos # 02/22/2019 0.1  0.0 - 0.5 K/uL Final    Baso # 02/22/2019 0.02  0.00 - 0.20 K/uL Final    Gran% 02/22/2019 60.9  38.0 - 73.0 % Final    Lymph% 02/22/2019 27.2  18.0 - 48.0 % Final    Mono% 02/22/2019 8.4  4.0 - 15.0 % Final    Eosinophil% 02/22/2019 2.7  0.0 - 8.0 % Final    Basophil% 02/22/2019 0.4  0.0 - 1.9 % Final    Differential Method 02/22/2019 Automated   Final    Sodium 02/22/2019 137  136 - 145 mmol/L Final    Potassium 02/22/2019 4.3  3.5 - 5.1 mmol/L Final    Chloride 02/22/2019 102  95 - 110 mmol/L Final    CO2 02/22/2019 28  23 - 29 mmol/L Final    Glucose 02/22/2019 86  70 - 110 mg/dL Final    BUN, Bld 02/22/2019 11  6 - 20 mg/dL Final    Creatinine 02/22/2019 0.8  0.5 - 1.4 mg/dL Final    Calcium 02/22/2019 9.9  8.7 - 10.5 mg/dL Final     Total Protein 02/22/2019 7.6  6.0 - 8.4 g/dL Final    Albumin 02/22/2019 4.4  3.5 - 5.2 g/dL Final    Total Bilirubin 02/22/2019 0.4  0.1 - 1.0 mg/dL Final    Alkaline Phosphatase 02/22/2019 62  55 - 135 U/L Final    AST 02/22/2019 24  10 - 40 U/L Final    ALT 02/22/2019 22  10 - 44 U/L Final    Anion Gap 02/22/2019 7* 8 - 16 mmol/L Final    eGFR if African American 02/22/2019 >60  >60 mL/min/1.73 m^2 Final    eGFR if non African American 02/22/2019 >60  >60 mL/min/1.73 m^2 Final    Hemoglobin A1C 02/22/2019 4.9  4.0 - 5.6 % Final    Estimated Avg Glucose 02/22/2019 94  68 - 131 mg/dL Final    Cholesterol 02/22/2019 214* 120 - 199 mg/dL Final    Triglycerides 02/22/2019 69  30 - 150 mg/dL Final    HDL 02/22/2019 52  40 - 75 mg/dL Final    LDL Cholesterol 02/22/2019 148.2  63.0 - 159.0 mg/dL Final    Hdl/Cholesterol Ratio 02/22/2019 24.3  20.0 - 50.0 % Final    Total Cholesterol/HDL Ratio 02/22/2019 4.1  2.0 - 5.0 Final    Non-HDL Cholesterol 02/22/2019 162  mg/dL Final    TSH 02/22/2019 0.959  0.400 - 4.000 uIU/mL Final    Vit D, 25-Hydroxy 02/22/2019 30  30 - 96 ng/mL Final    Ferritin 02/22/2019 9* 20.0 - 300.0 ng/mL Final    Iron 02/22/2019 44  30 - 160 ug/dL Final    Transferrin 02/22/2019 321  200 - 375 mg/dL Final    TIBC 02/22/2019 475* 250 - 450 ug/dL Final    Saturated Iron 02/22/2019 9* 20 - 50 % Final         Medications  Outpatient Encounter Medications as of 5/27/2019   Medication Sig Dispense Refill    ALPRAZolam (XANAX) 0.25 MG tablet Take 1 tablet (0.25 mg total) by mouth 2 (two) times daily as needed for Anxiety. 30 tablet 1    aspirin-acetaminophen-caffeine 250-250-65 mg (EXCEDRIN MIGRAINE) 250-250-65 mg per tablet Take 1 tablet by mouth every 6 (six) hours as needed for Pain.      cetirizine (ZYRTEC) 10 MG tablet Take 1 tablet (10 mg total) by mouth once daily.  0    citalopram (CELEXA) 20 MG tablet Take 1 tablet (20 mg total) by mouth once daily. 90 tablet 3     meclizine (ANTIVERT) 25 mg tablet Take 1 tablet (25 mg total) by mouth 3 (three) times daily as needed for Dizziness. 21 tablet 0     No facility-administered encounter medications on file as of 5/27/2019.            Assessment - Diagnosis - Goals:     Impression:       ICD-10-CM ICD-9-CM   1. ADHD (attention deficit hyperactivity disorder), inattentive type F90.0 314.00   2. Generalized anxiety disorder F41.1 300.02       Strengths and Liabilities: Strength: Patient accepts guidance/feedback, Strength: Patient is expressive/articulate., Strength: Patient is intelligent., Strength: Patient has positive support network., Liability: Patient lacks coping skills.    Treatment Goals:  Specify outcomes written in observable, behavioral terms:   Anxiety: acquiring relapse prevention skills, reducing negative automatic thoughts, reducing physical symptoms of anxiety and reducing time spent worrying (<30 minutes/day)  ADHD (increase capacity for sustined focus and attention)    Treatment Plan/Recommendations:   · Medication Management: Continue current medications. The risks and benefits of medication were discussed with the patient.  · The treatment plan and follow up plan were reviewed with the patient.   · Start Adderall 10 mg po BID (3-months Rx printed)  · Continue Celexa 20 mg po daily  · Continue Xanax 0.25 mg po BID PRN  · ASRS Rating Scale  · Ordered and reviewed urine drug screen: negative  · Counseling this visit focused on building adaptive coping skills, medication teaching, and behavior modifications for ADHD.     Return to Clinic: 3 months    Counseling time: 33 minutes  Total time: 60 minutes    Risks, benefits, side effects and alternative treatments discussed with patient. Patient agrees with the current plan as documented.  Encouraged Patient to keep future appointments.  Take medications as prescribed and abstain from substance abuse.  Pt to present to ED for thoughts to harm herself or others

## 2019-05-27 ENCOUNTER — OFFICE VISIT (OUTPATIENT)
Dept: PSYCHIATRY | Facility: CLINIC | Age: 39
End: 2019-05-27
Payer: COMMERCIAL

## 2019-05-27 VITALS
DIASTOLIC BLOOD PRESSURE: 54 MMHG | HEART RATE: 72 BPM | SYSTOLIC BLOOD PRESSURE: 115 MMHG | WEIGHT: 224.63 LBS | BODY MASS INDEX: 32.23 KG/M2

## 2019-05-27 DIAGNOSIS — F41.1 GENERALIZED ANXIETY DISORDER: ICD-10-CM

## 2019-05-27 DIAGNOSIS — F90.0 ADHD (ATTENTION DEFICIT HYPERACTIVITY DISORDER), INATTENTIVE TYPE: Primary | ICD-10-CM

## 2019-05-27 PROCEDURE — 3008F PR BODY MASS INDEX (BMI) DOCUMENTED: ICD-10-PCS | Mod: CPTII,S$GLB,, | Performed by: NURSE PRACTITIONER

## 2019-05-27 PROCEDURE — 99205 PR OFFICE/OUTPT VISIT, NEW, LEVL V, 60-74 MIN: ICD-10-PCS | Mod: S$GLB,,, | Performed by: NURSE PRACTITIONER

## 2019-05-27 PROCEDURE — 99999 PR PBB SHADOW E&M-EST. PATIENT-LVL III: ICD-10-PCS | Mod: PBBFAC,,, | Performed by: NURSE PRACTITIONER

## 2019-05-27 PROCEDURE — 99999 PR PBB SHADOW E&M-EST. PATIENT-LVL III: CPT | Mod: PBBFAC,,, | Performed by: NURSE PRACTITIONER

## 2019-05-27 PROCEDURE — 3008F BODY MASS INDEX DOCD: CPT | Mod: CPTII,S$GLB,, | Performed by: NURSE PRACTITIONER

## 2019-05-27 PROCEDURE — 99205 OFFICE O/P NEW HI 60 MIN: CPT | Mod: S$GLB,,, | Performed by: NURSE PRACTITIONER

## 2019-05-27 RX ORDER — DEXTROAMPHETAMINE SACCHARATE, AMPHETAMINE ASPARTATE, DEXTROAMPHETAMINE SULFATE AND AMPHETAMINE SULFATE 2.5; 2.5; 2.5; 2.5 MG/1; MG/1; MG/1; MG/1
10 TABLET ORAL 2 TIMES DAILY
Qty: 60 TABLET | Refills: 0 | Status: SHIPPED | OUTPATIENT
Start: 2019-06-27 | End: 2019-05-27 | Stop reason: SDUPTHER

## 2019-05-27 RX ORDER — DEXTROAMPHETAMINE SACCHARATE, AMPHETAMINE ASPARTATE, DEXTROAMPHETAMINE SULFATE AND AMPHETAMINE SULFATE 2.5; 2.5; 2.5; 2.5 MG/1; MG/1; MG/1; MG/1
10 TABLET ORAL 2 TIMES DAILY
Qty: 60 TABLET | Refills: 0 | Status: SHIPPED | OUTPATIENT
Start: 2019-05-27 | End: 2019-05-27 | Stop reason: SDUPTHER

## 2019-05-27 RX ORDER — DEXTROAMPHETAMINE SACCHARATE, AMPHETAMINE ASPARTATE, DEXTROAMPHETAMINE SULFATE AND AMPHETAMINE SULFATE 2.5; 2.5; 2.5; 2.5 MG/1; MG/1; MG/1; MG/1
10 TABLET ORAL 2 TIMES DAILY
Qty: 60 TABLET | Refills: 0 | Status: SHIPPED | OUTPATIENT
Start: 2019-07-27 | End: 2019-08-16 | Stop reason: DRUGHIGH

## 2019-08-16 ENCOUNTER — OFFICE VISIT (OUTPATIENT)
Dept: PSYCHIATRY | Facility: CLINIC | Age: 39
End: 2019-08-16
Payer: COMMERCIAL

## 2019-08-16 VITALS
BODY MASS INDEX: 28.8 KG/M2 | SYSTOLIC BLOOD PRESSURE: 120 MMHG | HEIGHT: 70 IN | HEART RATE: 85 BPM | DIASTOLIC BLOOD PRESSURE: 57 MMHG | WEIGHT: 201.19 LBS

## 2019-08-16 DIAGNOSIS — F90.0 ADHD (ATTENTION DEFICIT HYPERACTIVITY DISORDER), INATTENTIVE TYPE: Primary | ICD-10-CM

## 2019-08-16 DIAGNOSIS — F41.9 ANXIETY: ICD-10-CM

## 2019-08-16 PROCEDURE — 3008F BODY MASS INDEX DOCD: CPT | Mod: CPTII,S$GLB,, | Performed by: NURSE PRACTITIONER

## 2019-08-16 PROCEDURE — 99999 PR PBB SHADOW E&M-EST. PATIENT-LVL III: ICD-10-PCS | Mod: PBBFAC,,, | Performed by: NURSE PRACTITIONER

## 2019-08-16 PROCEDURE — 3008F PR BODY MASS INDEX (BMI) DOCUMENTED: ICD-10-PCS | Mod: CPTII,S$GLB,, | Performed by: NURSE PRACTITIONER

## 2019-08-16 PROCEDURE — 99213 PR OFFICE/OUTPT VISIT, EST, LEVL III, 20-29 MIN: ICD-10-PCS | Mod: S$GLB,,, | Performed by: NURSE PRACTITIONER

## 2019-08-16 PROCEDURE — 99999 PR PBB SHADOW E&M-EST. PATIENT-LVL III: CPT | Mod: PBBFAC,,, | Performed by: NURSE PRACTITIONER

## 2019-08-16 PROCEDURE — 99213 OFFICE O/P EST LOW 20 MIN: CPT | Mod: S$GLB,,, | Performed by: NURSE PRACTITIONER

## 2019-08-16 RX ORDER — DEXTROAMPHETAMINE SACCHARATE, AMPHETAMINE ASPARTATE MONOHYDRATE, DEXTROAMPHETAMINE SULFATE AND AMPHETAMINE SULFATE 7.5; 7.5; 7.5; 7.5 MG/1; MG/1; MG/1; MG/1
30 CAPSULE, EXTENDED RELEASE ORAL EVERY MORNING
Qty: 30 CAPSULE | Refills: 0 | Status: SHIPPED | OUTPATIENT
Start: 2019-10-16 | End: 2019-10-24 | Stop reason: SDUPTHER

## 2019-08-16 RX ORDER — ALPRAZOLAM 0.25 MG/1
0.25 TABLET ORAL 2 TIMES DAILY PRN
Qty: 30 TABLET | Refills: 1 | Status: SHIPPED | OUTPATIENT
Start: 2019-08-16 | End: 2019-12-24 | Stop reason: SDUPTHER

## 2019-08-16 RX ORDER — DEXTROAMPHETAMINE SACCHARATE, AMPHETAMINE ASPARTATE MONOHYDRATE, DEXTROAMPHETAMINE SULFATE AND AMPHETAMINE SULFATE 7.5; 7.5; 7.5; 7.5 MG/1; MG/1; MG/1; MG/1
30 CAPSULE, EXTENDED RELEASE ORAL EVERY MORNING
Qty: 30 CAPSULE | Refills: 0 | Status: SHIPPED | OUTPATIENT
Start: 2019-09-16 | End: 2019-08-16 | Stop reason: SDUPTHER

## 2019-08-16 RX ORDER — DEXTROAMPHETAMINE SACCHARATE, AMPHETAMINE ASPARTATE MONOHYDRATE, DEXTROAMPHETAMINE SULFATE AND AMPHETAMINE SULFATE 7.5; 7.5; 7.5; 7.5 MG/1; MG/1; MG/1; MG/1
30 CAPSULE, EXTENDED RELEASE ORAL EVERY MORNING
Qty: 30 CAPSULE | Refills: 0 | Status: SHIPPED | OUTPATIENT
Start: 2019-08-16 | End: 2019-08-16 | Stop reason: SDUPTHER

## 2019-08-16 NOTE — PROGRESS NOTES
Outpatient Psychiatry Follow-Up Visit (MD/NP)    8/16/2019    Clinical Status of Patient:  Outpatient (Ambulatory)    Chief Complaint:  Ruma Guillen is a 39 y.o. female who presents today for follow-up of anxiety and attention problems.  Met with patient.      Last visit was: 5/27/19. Chart and  reviewed    Interval History and Content of Current Session:  Current Psychiatric Medications/changes  · Start Adderall 10 mg po BID (3-months Rx printed)  · Continue Celexa 20 mg po daily  · Continue Xanax 0.25 mg po BID PRN    Pt presents bright affect and euthymic mood. Thought processes are clear and organized.  Pt reports that medication is working a little but still has she continues to have ADHD symptoms of distraction. Reports side effects of decreased appetite.  Sometimes forgets to take second dose. Will increase to 30 mg XR.  Having stress at work due to ransom ware attacks. Functioning well with medication. Denies SI/HI/AVH.     Psychotherapy:  · Target symptoms: distractability, anxiety , work stress  · Why chosen therapy is appropriate versus another modality: relevant to diagnosis  · Outcome monitoring methods: self-report  · Therapeutic intervention type: behavior modifying psychotherapy  · Topics discussed/themes: building skills sets for symptom management, symptom recognition  · The patient's response to the intervention is accepting. The patient's progress toward treatment goals is good.   · Duration of intervention: 11 minutes.    Review of Systems   · PSYCHIATRIC: Pertinant items are noted in the narrative.  · CONSTITUTIONAL: No weight gain or loss.   · MUSCULOSKELETAL: No pain or stiffness of the joints.  · NEUROLOGIC: No weakness, sensory changes, seizures, confusion, memory loss, tremor or other abnormal movements.  · ENDOCRINE: No polydipsia or polyuria.  · INTEGUMENTARY: No rashes or lacerations.  · EYES: No exophthalmos, jaundice or blindness.  · ENT: No dizziness, tinnitus or hearing  "loss.  · RESPIRATORY: No shortness of breath.  · CARDIOVASCULAR: No tachycardia or chest pain.  · GASTROINTESTINAL: No nausea, vomiting, pain, constipation or diarrhea.  · GENITOURINARY: No frequency, dysuria or sexual dysfunction.  · HEMATOLOGIC/LYMPHATIC: No excessive bleeding, prolonged or excessive bleeding after dental extraction/injury.  · ALLERGIC/IMMUNOLOGIC: No allergic response to materials, foods or animals at this time.    Past Medical, Family and Social History: The patient's past medical, family and social history have been reviewed and updated as appropriate within the electronic medical record - see encounter notes.    Compliance: yes    Side effects: None    Risk Parameters:  Patient reports no suicidal ideation  Patient reports no homicidal ideation  Patient reports no self-injurious behavior  Patient reports no violent behavior    Exam (detailed: at least 9 elements; comprehensive: all 15 elements)   Constitutional  Vitals:  Most recent vital signs, dated greater than 90 days prior to this appointment, were reviewed.   Vitals:    08/16/19 1527   BP: (!) 120/57   Pulse: 85   Weight: 91.3 kg (201 lb 2.7 oz)   Height: 5' 10" (1.778 m)        General:  unremarkable, age appropriate     Musculoskeletal  Muscle Strength/Tone:  no tremor, no tic   Gait & Station:  non-ataxic     Psychiatric  Speech:  no latency; no press   Mood & Affect:  euthymic  congruent and appropriate   Thought Process:  normal and logical   Associations:  intact   Thought Content:  normal, no suicidality, no homicidality, delusions, or paranoia   Insight:  intact   Judgement: behavior is adequate to circumstances   Orientation:  grossly intact   Memory: intact for content of interview   Language: grossly intact   Attention Span & Concentration:  able to focus   Fund of Knowledge:  intact and appropriate to age and level of education     Assessment and Diagnosis   Status/Progress: Based on the examination today, the patient's " problem(s) is/are improved and adequately but not ideally controlled.  New problems have not been presented today.   Co-morbidities and Lack of compliance are not complicating management of the primary condition.  There are no active rule-out diagnoses for this patient at this time.     General Impression:       ICD-10-CM ICD-9-CM   1. ADHD (attention deficit hyperactivity disorder), inattentive type F90.0 314.00   2. Anxiety F41.9 300.00       Intervention/Counseling/Treatment Plan   · Medication Management: The risks and benefits of medication were discussed with the patient.  · Switch to Adderall XR 30 mg po daily (3-months Rx printed)  · Continue Celexa 20 mg po daily  · Continue Xanax 0.25 mg po BID PRN    Return to Clinic: 3 months    Risks, benefits, side effects and alternative treatments discussed with patient. Patient agrees with the current plan as documented.  Encouraged Patient to keep future appointments.  Take medications as prescribed and abstain from substance abuse.  Pt to present to ED for thoughts to harm herself or others

## 2019-10-19 ENCOUNTER — PATIENT MESSAGE (OUTPATIENT)
Dept: PSYCHIATRY | Facility: CLINIC | Age: 39
End: 2019-10-19

## 2019-10-24 ENCOUNTER — TELEPHONE (OUTPATIENT)
Dept: PSYCHIATRY | Facility: HOSPITAL | Age: 39
End: 2019-10-24

## 2019-10-24 DIAGNOSIS — F90.0 ADHD (ATTENTION DEFICIT HYPERACTIVITY DISORDER), INATTENTIVE TYPE: ICD-10-CM

## 2019-10-24 RX ORDER — DEXTROAMPHETAMINE SACCHARATE, AMPHETAMINE ASPARTATE MONOHYDRATE, DEXTROAMPHETAMINE SULFATE AND AMPHETAMINE SULFATE 7.5; 7.5; 7.5; 7.5 MG/1; MG/1; MG/1; MG/1
30 CAPSULE, EXTENDED RELEASE ORAL EVERY MORNING
Qty: 30 CAPSULE | Refills: 0 | Status: SHIPPED | OUTPATIENT
Start: 2019-10-24 | End: 2019-12-24 | Stop reason: SDUPTHER

## 2019-12-23 NOTE — PROGRESS NOTES
Outpatient Psychiatry Follow-Up Visit (MD/NP)    12/24/2019    Clinical Status of Patient:  Outpatient (Ambulatory)    Chief Complaint:  Ruma Guillen is a 39 y.o. female who presents today for follow-up of anxiety and attention problems.  Met with patient.      Last visit was: 8/16/19. Chart and  reviewed    Interval History and Content of Current Session:  Current Psychiatric Medications/changes  · Switch to Adderall XR 30 mg po daily (3-months Rx printed)  · Continue Celexa 20 mg po daily  · Continue Xanax 0.25 mg po BID PRN    Pt presents bright affect and euthymic mood.  Reports that she is coping with family stressors.  States that her mother recently attempted suicide.  Pt reports effective results from medications and denies side effects.   Denies SI/HI/AVH.  Will continue medications.      Psychotherapy:  · Target symptoms: distractability, anxiety , work stress  · Why chosen therapy is appropriate versus another modality: relevant to diagnosis  · Outcome monitoring methods: self-report  · Therapeutic intervention type: behavior modifying psychotherapy  · Topics discussed/themes: building skills sets for symptom management, symptom recognition  · The patient's response to the intervention is accepting. The patient's progress toward treatment goals is good.   · Duration of intervention: 11 minutes.    Review of Systems   · PSYCHIATRIC: Pertinant items are noted in the narrative.  · CONSTITUTIONAL: No weight gain or loss.   · MUSCULOSKELETAL: No pain or stiffness of the joints.  · NEUROLOGIC: No weakness, sensory changes, seizures, confusion, memory loss, tremor or other abnormal movements.  · ENDOCRINE: No polydipsia or polyuria.  · INTEGUMENTARY: No rashes or lacerations.  · EYES: No exophthalmos, jaundice or blindness.  · ENT: No dizziness, tinnitus or hearing loss.  · RESPIRATORY: No shortness of breath.  · CARDIOVASCULAR: No tachycardia or chest pain.  · GASTROINTESTINAL: No nausea, vomiting, pain,  constipation or diarrhea.  · GENITOURINARY: No frequency, dysuria or sexual dysfunction.  · HEMATOLOGIC/LYMPHATIC: No excessive bleeding, prolonged or excessive bleeding after dental extraction/injury.  · ALLERGIC/IMMUNOLOGIC: No allergic response to materials, foods or animals at this time.    Past Medical, Family and Social History: The patient's past medical, family and social history have been reviewed and updated as appropriate within the electronic medical record - see encounter notes.    Compliance: yes    Side effects: None    Risk Parameters:  Patient reports no suicidal ideation  Patient reports no homicidal ideation  Patient reports no self-injurious behavior  Patient reports no violent behavior    Exam (detailed: at least 9 elements; comprehensive: all 15 elements)   Constitutional  Vitals:  Most recent vital signs, dated greater than 90 days prior to this appointment, were reviewed.   Vitals:    12/24/19 0839   BP: 117/61   Pulse: 78   Weight: 94.4 kg (208 lb 1.8 oz)        General:  unremarkable, age appropriate     Musculoskeletal  Muscle Strength/Tone:  no tremor, no tic   Gait & Station:  non-ataxic     Psychiatric  Speech:  no latency; no press   Mood & Affect:  euthymic  congruent and appropriate   Thought Process:  normal and logical   Associations:  intact   Thought Content:  normal, no suicidality, no homicidality, delusions, or paranoia   Insight:  intact   Judgement: behavior is adequate to circumstances   Orientation:  grossly intact   Memory: intact for content of interview   Language: grossly intact   Attention Span & Concentration:  able to focus   Fund of Knowledge:  intact and appropriate to age and level of education     Assessment and Diagnosis   Status/Progress: Based on the examination today, the patient's problem(s) is/are improved and adequately but not ideally controlled.  New problems have not been presented today.   Co-morbidities and Lack of compliance are not complicating  management of the primary condition.  There are no active rule-out diagnoses for this patient at this time.     General Impression:       ICD-10-CM ICD-9-CM   1. Generalized anxiety disorder F41.1 300.02   2. ADHD (attention deficit hyperactivity disorder), inattentive type F90.0 314.00   3. Anxiety F41.9 300.00       Intervention/Counseling/Treatment Plan   · Medication Management: The risks and benefits of medication were discussed with the patient.  · Continue Adderall XR 30 mg po daily (3-months Rx printed)  · Continue Celexa 20 mg po daily  · Continue Xanax 0.25 mg po BID PRN    Return to Clinic: 6 months    Risks, benefits, side effects and alternative treatments discussed with patient. Patient agrees with the current plan as documented.  Encouraged Patient to keep future appointments.  Take medications as prescribed and abstain from substance abuse.  Pt to present to ED for thoughts to harm herself or others

## 2019-12-24 ENCOUNTER — OFFICE VISIT (OUTPATIENT)
Dept: PSYCHIATRY | Facility: CLINIC | Age: 39
End: 2019-12-24
Payer: COMMERCIAL

## 2019-12-24 VITALS
WEIGHT: 208.13 LBS | BODY MASS INDEX: 29.86 KG/M2 | DIASTOLIC BLOOD PRESSURE: 61 MMHG | SYSTOLIC BLOOD PRESSURE: 117 MMHG | HEART RATE: 78 BPM

## 2019-12-24 DIAGNOSIS — F90.0 ADHD (ATTENTION DEFICIT HYPERACTIVITY DISORDER), INATTENTIVE TYPE: ICD-10-CM

## 2019-12-24 DIAGNOSIS — F41.1 GENERALIZED ANXIETY DISORDER: Primary | ICD-10-CM

## 2019-12-24 DIAGNOSIS — F41.9 ANXIETY: ICD-10-CM

## 2019-12-24 PROCEDURE — 99999 PR PBB SHADOW E&M-EST. PATIENT-LVL III: ICD-10-PCS | Mod: PBBFAC,,, | Performed by: NURSE PRACTITIONER

## 2019-12-24 PROCEDURE — 3008F BODY MASS INDEX DOCD: CPT | Mod: CPTII,S$GLB,, | Performed by: NURSE PRACTITIONER

## 2019-12-24 PROCEDURE — 99213 PR OFFICE/OUTPT VISIT, EST, LEVL III, 20-29 MIN: ICD-10-PCS | Mod: S$GLB,,, | Performed by: NURSE PRACTITIONER

## 2019-12-24 PROCEDURE — 3008F PR BODY MASS INDEX (BMI) DOCUMENTED: ICD-10-PCS | Mod: CPTII,S$GLB,, | Performed by: NURSE PRACTITIONER

## 2019-12-24 PROCEDURE — 99999 PR PBB SHADOW E&M-EST. PATIENT-LVL III: CPT | Mod: PBBFAC,,, | Performed by: NURSE PRACTITIONER

## 2019-12-24 PROCEDURE — 99213 OFFICE O/P EST LOW 20 MIN: CPT | Mod: S$GLB,,, | Performed by: NURSE PRACTITIONER

## 2019-12-24 RX ORDER — CITALOPRAM 20 MG/1
20 TABLET, FILM COATED ORAL DAILY
Qty: 90 TABLET | Refills: 3 | Status: SHIPPED | OUTPATIENT
Start: 2019-12-24 | End: 2020-03-13 | Stop reason: SDUPTHER

## 2019-12-24 RX ORDER — DEXTROAMPHETAMINE SACCHARATE, AMPHETAMINE ASPARTATE MONOHYDRATE, DEXTROAMPHETAMINE SULFATE AND AMPHETAMINE SULFATE 7.5; 7.5; 7.5; 7.5 MG/1; MG/1; MG/1; MG/1
30 CAPSULE, EXTENDED RELEASE ORAL EVERY MORNING
Qty: 30 CAPSULE | Refills: 0 | Status: SHIPPED | OUTPATIENT
Start: 2019-12-24 | End: 2019-12-24 | Stop reason: SDUPTHER

## 2019-12-24 RX ORDER — DEXTROAMPHETAMINE SACCHARATE, AMPHETAMINE ASPARTATE MONOHYDRATE, DEXTROAMPHETAMINE SULFATE AND AMPHETAMINE SULFATE 7.5; 7.5; 7.5; 7.5 MG/1; MG/1; MG/1; MG/1
30 CAPSULE, EXTENDED RELEASE ORAL EVERY MORNING
Qty: 30 CAPSULE | Refills: 0 | Status: SHIPPED | OUTPATIENT
Start: 2020-01-24 | End: 2019-12-24 | Stop reason: SDUPTHER

## 2019-12-24 RX ORDER — ALPRAZOLAM 0.25 MG/1
0.25 TABLET ORAL 2 TIMES DAILY PRN
Qty: 30 TABLET | Refills: 5 | Status: SHIPPED | OUTPATIENT
Start: 2019-12-24 | End: 2020-03-13 | Stop reason: SDUPTHER

## 2019-12-24 RX ORDER — DEXTROAMPHETAMINE SACCHARATE, AMPHETAMINE ASPARTATE MONOHYDRATE, DEXTROAMPHETAMINE SULFATE AND AMPHETAMINE SULFATE 7.5; 7.5; 7.5; 7.5 MG/1; MG/1; MG/1; MG/1
30 CAPSULE, EXTENDED RELEASE ORAL EVERY MORNING
Qty: 30 CAPSULE | Refills: 0 | Status: SHIPPED | OUTPATIENT
Start: 2020-02-24 | End: 2020-03-13 | Stop reason: SDUPTHER

## 2020-03-13 ENCOUNTER — OFFICE VISIT (OUTPATIENT)
Dept: PSYCHIATRY | Facility: CLINIC | Age: 40
End: 2020-03-13
Payer: COMMERCIAL

## 2020-03-13 VITALS
WEIGHT: 215.63 LBS | HEART RATE: 91 BPM | SYSTOLIC BLOOD PRESSURE: 121 MMHG | HEIGHT: 70 IN | DIASTOLIC BLOOD PRESSURE: 74 MMHG | BODY MASS INDEX: 30.87 KG/M2

## 2020-03-13 DIAGNOSIS — F41.9 ANXIETY: ICD-10-CM

## 2020-03-13 DIAGNOSIS — F90.0 ADHD (ATTENTION DEFICIT HYPERACTIVITY DISORDER), INATTENTIVE TYPE: ICD-10-CM

## 2020-03-13 PROCEDURE — 99213 PR OFFICE/OUTPT VISIT, EST, LEVL III, 20-29 MIN: ICD-10-PCS | Mod: S$GLB,,, | Performed by: NURSE PRACTITIONER

## 2020-03-13 PROCEDURE — 3008F PR BODY MASS INDEX (BMI) DOCUMENTED: ICD-10-PCS | Mod: CPTII,S$GLB,, | Performed by: NURSE PRACTITIONER

## 2020-03-13 PROCEDURE — 3008F BODY MASS INDEX DOCD: CPT | Mod: CPTII,S$GLB,, | Performed by: NURSE PRACTITIONER

## 2020-03-13 PROCEDURE — 99213 OFFICE O/P EST LOW 20 MIN: CPT | Mod: S$GLB,,, | Performed by: NURSE PRACTITIONER

## 2020-03-13 PROCEDURE — 99999 PR PBB SHADOW E&M-EST. PATIENT-LVL III: ICD-10-PCS | Mod: PBBFAC,,, | Performed by: NURSE PRACTITIONER

## 2020-03-13 PROCEDURE — 99999 PR PBB SHADOW E&M-EST. PATIENT-LVL III: CPT | Mod: PBBFAC,,, | Performed by: NURSE PRACTITIONER

## 2020-03-13 RX ORDER — ALPRAZOLAM 0.25 MG/1
0.25 TABLET ORAL 2 TIMES DAILY PRN
Qty: 30 TABLET | Refills: 5 | Status: SHIPPED | OUTPATIENT
Start: 2020-03-13 | End: 2020-06-12 | Stop reason: SDUPTHER

## 2020-03-13 RX ORDER — CITALOPRAM 20 MG/1
20 TABLET, FILM COATED ORAL DAILY
Qty: 90 TABLET | Refills: 3 | Status: SHIPPED | OUTPATIENT
Start: 2020-03-13 | End: 2020-06-12 | Stop reason: SDUPTHER

## 2020-03-13 RX ORDER — DEXTROAMPHETAMINE SACCHARATE, AMPHETAMINE ASPARTATE MONOHYDRATE, DEXTROAMPHETAMINE SULFATE AND AMPHETAMINE SULFATE 7.5; 7.5; 7.5; 7.5 MG/1; MG/1; MG/1; MG/1
30 CAPSULE, EXTENDED RELEASE ORAL EVERY MORNING
Qty: 30 CAPSULE | Refills: 0 | Status: SHIPPED | OUTPATIENT
Start: 2020-05-23 | End: 2020-06-12 | Stop reason: SDUPTHER

## 2020-03-13 RX ORDER — DEXTROAMPHETAMINE SACCHARATE, AMPHETAMINE ASPARTATE MONOHYDRATE, DEXTROAMPHETAMINE SULFATE AND AMPHETAMINE SULFATE 7.5; 7.5; 7.5; 7.5 MG/1; MG/1; MG/1; MG/1
30 CAPSULE, EXTENDED RELEASE ORAL EVERY MORNING
Qty: 30 CAPSULE | Refills: 0 | Status: SHIPPED | OUTPATIENT
Start: 2020-04-23 | End: 2020-03-13 | Stop reason: SDUPTHER

## 2020-03-13 RX ORDER — DEXTROAMPHETAMINE SACCHARATE, AMPHETAMINE ASPARTATE MONOHYDRATE, DEXTROAMPHETAMINE SULFATE AND AMPHETAMINE SULFATE 7.5; 7.5; 7.5; 7.5 MG/1; MG/1; MG/1; MG/1
30 CAPSULE, EXTENDED RELEASE ORAL EVERY MORNING
Qty: 30 CAPSULE | Refills: 0 | Status: SHIPPED | OUTPATIENT
Start: 2020-03-23 | End: 2020-03-13 | Stop reason: SDUPTHER

## 2020-06-01 ENCOUNTER — TELEPHONE (OUTPATIENT)
Dept: INTERNAL MEDICINE | Facility: CLINIC | Age: 40
End: 2020-06-01

## 2020-06-01 ENCOUNTER — LAB VISIT (OUTPATIENT)
Dept: LAB | Facility: HOSPITAL | Age: 40
End: 2020-06-01
Attending: INTERNAL MEDICINE
Payer: COMMERCIAL

## 2020-06-01 ENCOUNTER — OFFICE VISIT (OUTPATIENT)
Dept: INTERNAL MEDICINE | Facility: CLINIC | Age: 40
End: 2020-06-01
Payer: COMMERCIAL

## 2020-06-01 VITALS
DIASTOLIC BLOOD PRESSURE: 72 MMHG | WEIGHT: 217.63 LBS | BODY MASS INDEX: 31.16 KG/M2 | HEIGHT: 70 IN | SYSTOLIC BLOOD PRESSURE: 116 MMHG | OXYGEN SATURATION: 97 % | HEART RATE: 70 BPM

## 2020-06-01 DIAGNOSIS — Z00.00 ANNUAL PHYSICAL EXAM: Primary | ICD-10-CM

## 2020-06-01 DIAGNOSIS — F41.9 ANXIETY: ICD-10-CM

## 2020-06-01 DIAGNOSIS — D50.9 IRON DEFICIENCY ANEMIA, UNSPECIFIED IRON DEFICIENCY ANEMIA TYPE: ICD-10-CM

## 2020-06-01 DIAGNOSIS — Z12.4 CERVICAL CANCER SCREENING: ICD-10-CM

## 2020-06-01 DIAGNOSIS — Z00.00 ANNUAL PHYSICAL EXAM: ICD-10-CM

## 2020-06-01 PROBLEM — F90.9 ADHD: Status: ACTIVE | Noted: 2020-06-01

## 2020-06-01 LAB
ALBUMIN SERPL BCP-MCNC: 4.5 G/DL (ref 3.5–5.2)
ALP SERPL-CCNC: 64 U/L (ref 55–135)
ALT SERPL W/O P-5'-P-CCNC: 14 U/L (ref 10–44)
ANION GAP SERPL CALC-SCNC: 9 MMOL/L (ref 8–16)
AST SERPL-CCNC: 16 U/L (ref 10–40)
BASOPHILS # BLD AUTO: 0.03 K/UL (ref 0–0.2)
BASOPHILS NFR BLD: 0.5 % (ref 0–1.9)
BILIRUB SERPL-MCNC: 0.3 MG/DL (ref 0.1–1)
BUN SERPL-MCNC: 21 MG/DL (ref 6–20)
CALCIUM SERPL-MCNC: 9.3 MG/DL (ref 8.7–10.5)
CHLORIDE SERPL-SCNC: 107 MMOL/L (ref 95–110)
CHOLEST SERPL-MCNC: 231 MG/DL (ref 120–199)
CHOLEST/HDLC SERPL: 3.9 {RATIO} (ref 2–5)
CO2 SERPL-SCNC: 24 MMOL/L (ref 23–29)
CREAT SERPL-MCNC: 0.8 MG/DL (ref 0.5–1.4)
DIFFERENTIAL METHOD: ABNORMAL
EOSINOPHIL # BLD AUTO: 0.2 K/UL (ref 0–0.5)
EOSINOPHIL NFR BLD: 3.7 % (ref 0–8)
ERYTHROCYTE [DISTWIDTH] IN BLOOD BY AUTOMATED COUNT: 12.7 % (ref 11.5–14.5)
EST. GFR  (AFRICAN AMERICAN): >60 ML/MIN/1.73 M^2
EST. GFR  (NON AFRICAN AMERICAN): >60 ML/MIN/1.73 M^2
ESTIMATED AVG GLUCOSE: 97 MG/DL (ref 68–131)
FERRITIN SERPL-MCNC: 33 NG/ML (ref 20–300)
GLUCOSE SERPL-MCNC: 79 MG/DL (ref 70–110)
HBA1C MFR BLD HPLC: 5 % (ref 4–5.6)
HCT VFR BLD AUTO: 45.2 % (ref 37–48.5)
HDLC SERPL-MCNC: 60 MG/DL (ref 40–75)
HDLC SERPL: 26 % (ref 20–50)
HGB BLD-MCNC: 14.3 G/DL (ref 12–16)
IMM GRANULOCYTES # BLD AUTO: 0.02 K/UL (ref 0–0.04)
IMM GRANULOCYTES NFR BLD AUTO: 0.4 % (ref 0–0.5)
IRON SERPL-MCNC: 92 UG/DL (ref 30–160)
LDLC SERPL CALC-MCNC: 157.6 MG/DL (ref 63–159)
LYMPHOCYTES # BLD AUTO: 1.7 K/UL (ref 1–4.8)
LYMPHOCYTES NFR BLD: 30.2 % (ref 18–48)
MCH RBC QN AUTO: 29.6 PG (ref 27–31)
MCHC RBC AUTO-ENTMCNC: 31.6 G/DL (ref 32–36)
MCV RBC AUTO: 94 FL (ref 82–98)
MONOCYTES # BLD AUTO: 0.5 K/UL (ref 0.3–1)
MONOCYTES NFR BLD: 8.2 % (ref 4–15)
NEUTROPHILS # BLD AUTO: 3.2 K/UL (ref 1.8–7.7)
NEUTROPHILS NFR BLD: 57 % (ref 38–73)
NONHDLC SERPL-MCNC: 171 MG/DL
NRBC BLD-RTO: 0 /100 WBC
PLATELET # BLD AUTO: 245 K/UL (ref 150–350)
PMV BLD AUTO: 10.7 FL (ref 9.2–12.9)
POTASSIUM SERPL-SCNC: 4.5 MMOL/L (ref 3.5–5.1)
PROT SERPL-MCNC: 7.9 G/DL (ref 6–8.4)
RBC # BLD AUTO: 4.83 M/UL (ref 4–5.4)
SATURATED IRON: 22 % (ref 20–50)
SODIUM SERPL-SCNC: 140 MMOL/L (ref 136–145)
TOTAL IRON BINDING CAPACITY: 423 UG/DL (ref 250–450)
TRANSFERRIN SERPL-MCNC: 286 MG/DL (ref 200–375)
TRIGL SERPL-MCNC: 67 MG/DL (ref 30–150)
TSH SERPL DL<=0.005 MIU/L-ACNC: 1.15 UIU/ML (ref 0.4–4)
WBC # BLD AUTO: 5.62 K/UL (ref 3.9–12.7)

## 2020-06-01 PROCEDURE — 83036 HEMOGLOBIN GLYCOSYLATED A1C: CPT

## 2020-06-01 PROCEDURE — 85025 COMPLETE CBC W/AUTO DIFF WBC: CPT

## 2020-06-01 PROCEDURE — 82728 ASSAY OF FERRITIN: CPT

## 2020-06-01 PROCEDURE — 99999 PR PBB SHADOW E&M-EST. PATIENT-LVL IV: CPT | Mod: PBBFAC,,, | Performed by: INTERNAL MEDICINE

## 2020-06-01 PROCEDURE — 99395 PR PREVENTIVE VISIT,EST,18-39: ICD-10-PCS | Mod: S$GLB,,, | Performed by: INTERNAL MEDICINE

## 2020-06-01 PROCEDURE — 36415 COLL VENOUS BLD VENIPUNCTURE: CPT

## 2020-06-01 PROCEDURE — 80061 LIPID PANEL: CPT

## 2020-06-01 PROCEDURE — 83540 ASSAY OF IRON: CPT

## 2020-06-01 PROCEDURE — 99395 PREV VISIT EST AGE 18-39: CPT | Mod: S$GLB,,, | Performed by: INTERNAL MEDICINE

## 2020-06-01 PROCEDURE — 80053 COMPREHEN METABOLIC PANEL: CPT

## 2020-06-01 PROCEDURE — 84443 ASSAY THYROID STIM HORMONE: CPT

## 2020-06-01 PROCEDURE — 99999 PR PBB SHADOW E&M-EST. PATIENT-LVL IV: ICD-10-PCS | Mod: PBBFAC,,, | Performed by: INTERNAL MEDICINE

## 2020-06-01 NOTE — PROGRESS NOTES
INTERNAL MEDICINE ANNUAL VISIT NOTE      CHIEF COMPLAINT     ANNUAL    HPI     Ruma Guillen is a 39 y.o. C female who presents for annual.    Anxiety - celexa 20mg daily, prn xanax (rare use)  ADHD - adderall xr 30mg qd. Feels like it is working. Lasting all day. Previously was on IR but then forgets to take the second pill. Thus on 30mg daily.   Last seen Lamonte Urbina 3/15/20. F/u in 2 weeks.    KASSIDY - likely due to heavy menses. LMP was started last Thursday.  Taking OTC iron supplements daily.   Last ferritin 9 2/2019.    S/p appendectomy 3/2020 for appendicitis.   Occasional pain but not strong.   No nausea/vomiting/diarrhea. Did have constipation last week and held iron for a few days but improved now and back on iron daily.     Past Medical History:  Past Medical History:   Diagnosis Date    Anxiety     Headache, migraine     Hx of psychiatric care     KASSIDY (iron deficiency anemia)     Psychiatric problem     Seizures     childhood seizures - last seizure >28 yrs.    Sleep difficulties     Therapy        Past Surgical History:  Past Surgical History:   Procedure Laterality Date    WISDOM TOOTH EXTRACTION         Allergies:  Review of patient's allergies indicates:  No Known Allergies    Home Medications:  Prior to Admission medications    Medication Sig Start Date End Date Taking? Authorizing Provider   ALPRAZolam (XANAX) 0.25 MG tablet Take 1 tablet (0.25 mg total) by mouth 2 (two) times daily as needed for Anxiety. 3/13/20 3/19/20  Lamonte Urbina III, NP   aspirin-acetaminophen-caffeine 250-250-65 mg (EXCEDRIN MIGRAINE) 250-250-65 mg per tablet Take 1 tablet by mouth every 6 (six) hours as needed for Pain.    Historical Provider, MD   cetirizine (ZYRTEC) 10 MG tablet Take 1 tablet (10 mg total) by mouth once daily. 12/5/16 2/22/19  Stefany Worley MD   citalopram (CELEXA) 20 MG tablet Take 1 tablet (20 mg total) by mouth once daily. 3/13/20   Lamonte Urbina III, NP   dextroamphetamine-amphetamine  "(ADDERALL XR) 30 MG 24 hr capsule Take 1 capsule (30 mg total) by mouth every morning. 5/23/20   Lamonte Urbina III, NP   meclizine (ANTIVERT) 25 mg tablet Take 1 tablet (25 mg total) by mouth 3 (three) times daily as needed for Dizziness. 12/5/16   Stefany Worley MD       Family History:  Family History   Problem Relation Age of Onset    Arthritis Mother     Irritable bowel syndrome Mother     Heart disease Maternal Grandfather     Breast cancer Neg Hx     Colon cancer Neg Hx     Ovarian cancer Neg Hx        Social History:  Social History     Tobacco Use    Smoking status: Former Smoker    Tobacco comment: monthly - 2-3 cigarettes   Substance Use Topics    Alcohol use: Yes     Comment: occasionally    Drug use: No       Review of Systems:  Review of Systems   Constitutional: Negative for activity change, chills, fatigue and unexpected weight change.   HENT: Negative for hearing loss, rhinorrhea and trouble swallowing.    Eyes: Negative for discharge and visual disturbance.   Respiratory: Negative for cough, chest tightness, shortness of breath and wheezing.    Cardiovascular: Negative for chest pain and palpitations.   Gastrointestinal: Negative for blood in stool, constipation, diarrhea and vomiting.   Endocrine: Negative for polydipsia and polyuria.   Genitourinary: Negative for difficulty urinating, dysuria, hematuria and menstrual problem.   Musculoskeletal: Negative for arthralgias, joint swelling and neck pain.   Neurological: Negative for weakness and headaches.   Psychiatric/Behavioral: Negative for confusion and dysphoric mood.     Health Maintainence:    reviewed.     PHYSICAL EXAM     /72 (BP Location: Left arm, Patient Position: Sitting, BP Method: Large (Manual))   Pulse 70   Ht 5' 10" (1.778 m)   Wt 98.7 kg (217 lb 9.5 oz)   LMP 05/28/2020   SpO2 97%   BMI 31.22 kg/m²     GEN - A+OX4, NAD   HEENT - PERRL, EOMI, OP clear. MMM. TM normal.   Neck - No thyromegaly or cervical LAD. No " thyroid masses felt.  CV - RRR, no m/r   Chest - CTAB, no wheezing or rhonchi  Abd - S/NT/ND/+BS. Laparoscopic appy scar well healed.   Ext - 2+BDP and radial pulses. No LE edema.   Neuro - PERRL, EOMI, no nystagmus, eyebrow raise, facial sensation, hearing, m of mastication, smile, palatal raise, shoulder shrug, tongue protrusion symmetric and intact. 5/5 BUE and BLE strength. Sensation to light touch intact throughout. 2+ DTRs. Normal gait.   MSK - No spinal tenderness to palpation. Normal gait.   Skin - No rash.    LABS     Previous labs reviewed.    ASSESSMENT/PLAN     Ruma Guillen is a 39 y.o. female with  Ruma was seen today for annual exam.    Diagnoses and all orders for this visit:    Annual physical exam  -     CBC auto differential; Future; Expected date: 06/01/2020  -     Comprehensive metabolic panel; Future; Expected date: 06/01/2020  -     Hemoglobin A1C; Future; Expected date: 06/01/2020  -     Lipid Panel; Future; Expected date: 06/01/2020  -     TSH; Future; Expected date: 06/01/2020  -     Ferritin; Future; Expected date: 06/01/2020  -     Iron and TIBC; Future; Expected date: 06/01/2020    Anxiety - cont celexa and xanax prn. Follows w/ psychiatry.  -     Comprehensive metabolic panel; Future; Expected date: 06/01/2020  -     TSH; Future; Expected date: 06/01/2020    Iron deficiency anemia, unspecified iron deficiency anemia type - on OTC ferrous sulfate daily.   -     CBC auto differential; Future; Expected date: 06/01/2020  -     Ferritin; Future; Expected date: 06/01/2020  -     Iron and TIBC; Future; Expected date: 06/01/2020    Cervical cancer screening  -     Ambulatory referral/consult to Obstetrics / Gynecology; Future; Expected date: 06/08/2020      RTC in 12 months, sooner if needed and depending on labs.    Stefany Worley MD  Department of Internal Medicine - [facility]  7:58 AM

## 2020-06-05 ENCOUNTER — PATIENT MESSAGE (OUTPATIENT)
Dept: INTERNAL MEDICINE | Facility: CLINIC | Age: 40
End: 2020-06-05

## 2020-06-08 ENCOUNTER — PATIENT MESSAGE (OUTPATIENT)
Dept: INTERNAL MEDICINE | Facility: CLINIC | Age: 40
End: 2020-06-08

## 2020-06-09 ENCOUNTER — OFFICE VISIT (OUTPATIENT)
Dept: INTERNAL MEDICINE | Facility: CLINIC | Age: 40
End: 2020-06-09
Payer: COMMERCIAL

## 2020-06-09 VITALS
HEIGHT: 70 IN | DIASTOLIC BLOOD PRESSURE: 72 MMHG | SYSTOLIC BLOOD PRESSURE: 110 MMHG | OXYGEN SATURATION: 98 % | WEIGHT: 220.25 LBS | HEART RATE: 63 BPM | BODY MASS INDEX: 31.53 KG/M2

## 2020-06-09 DIAGNOSIS — I82.611: Primary | ICD-10-CM

## 2020-06-09 PROCEDURE — 99999 PR PBB SHADOW E&M-EST. PATIENT-LVL III: CPT | Mod: PBBFAC,,, | Performed by: INTERNAL MEDICINE

## 2020-06-09 PROCEDURE — 3008F BODY MASS INDEX DOCD: CPT | Mod: CPTII,S$GLB,, | Performed by: INTERNAL MEDICINE

## 2020-06-09 PROCEDURE — 99999 PR PBB SHADOW E&M-EST. PATIENT-LVL III: ICD-10-PCS | Mod: PBBFAC,,, | Performed by: INTERNAL MEDICINE

## 2020-06-09 PROCEDURE — 3008F PR BODY MASS INDEX (BMI) DOCUMENTED: ICD-10-PCS | Mod: CPTII,S$GLB,, | Performed by: INTERNAL MEDICINE

## 2020-06-09 PROCEDURE — 99213 OFFICE O/P EST LOW 20 MIN: CPT | Mod: S$GLB,,, | Performed by: INTERNAL MEDICINE

## 2020-06-09 PROCEDURE — 99213 PR OFFICE/OUTPT VISIT, EST, LEVL III, 20-29 MIN: ICD-10-PCS | Mod: S$GLB,,, | Performed by: INTERNAL MEDICINE

## 2020-06-09 NOTE — PROGRESS NOTES
"Subjective:       Patient ID: Ruma Guillen is a 39 y.o. female.    Chief Complaint: Mass (right hand noticed about a month again)    HPI   A few mo ago, she used the back of her R hand to press an elevator button. After that noted a bruise at the back of the R hand. Resolved after a week or so. A few days ago, noted a lump at the back of the R hand in the same spot. Became very painful to the point where it hurt to move. Doing better. Used warm compresses. Using koban sometimes to help immobilize the wrist to help w/ pain. Reports did UC visit and told it was a cyst.     No trauma.    Review of Systems   Constitutional: Negative for activity change and unexpected weight change.   HENT: Negative for hearing loss, rhinorrhea and trouble swallowing.    Eyes: Negative for discharge and visual disturbance.   Respiratory: Negative for chest tightness and wheezing.    Cardiovascular: Negative for chest pain and palpitations.   Gastrointestinal: Negative for blood in stool, constipation, diarrhea and vomiting.   Endocrine: Negative for polydipsia and polyuria.   Genitourinary: Negative for difficulty urinating, dysuria, hematuria and menstrual problem.   Musculoskeletal: Negative for arthralgias, joint swelling and neck pain.   Neurological: Negative for weakness and headaches.   Psychiatric/Behavioral: Negative for confusion and dysphoric mood.         Objective:      Physical Exam    /72 (BP Location: Left arm, Patient Position: Sitting, BP Method: Large (Manual))   Pulse 63   Ht 5' 10" (1.778 m)   Wt 99.9 kg (220 lb 3.8 oz)   LMP 05/28/2020   SpO2 98%   BMI 31.60 kg/m²     Gen - A+OX4, NAD  Ext - 2+ B radial pulses. Good hand  B. Slight bruising w/ knotted cord at the dorsum of R wrist. Painful on palpation. No erythema/edema.   MSK - as above.   Skin - as below          Assessment/Plan     Ruma was seen today for mass.    Diagnoses and all orders for this visit:    Acute thrombosis of superficial veins of " right upper extremity - discussed warm compresses. Can take ibuprofen/tylenol prn pain. Will take some time to reabsorb clot. If it makes her feel better, can use wrist brace intermittently to alleviate pain.   If dev any redness, swelling, numbness/tingling, SOB/CP, etc pt to let us know and come in for re-evaluation.  -     US Upper Extremity Veins Right; Future      Follow up if symptoms worsen or fail to improve.      Stefany Worley MD  Department of Internal Medicine - [facility]  8:01 AM

## 2020-06-12 ENCOUNTER — HOSPITAL ENCOUNTER (OUTPATIENT)
Dept: RADIOLOGY | Facility: HOSPITAL | Age: 40
Discharge: HOME | End: 2020-06-12
Attending: INTERNAL MEDICINE
Payer: COMMERCIAL

## 2020-06-12 ENCOUNTER — OFFICE VISIT (OUTPATIENT)
Dept: PSYCHIATRY | Facility: CLINIC | Age: 40
End: 2020-06-12
Payer: COMMERCIAL

## 2020-06-12 DIAGNOSIS — I82.611: ICD-10-CM

## 2020-06-12 DIAGNOSIS — F90.0 ADHD (ATTENTION DEFICIT HYPERACTIVITY DISORDER), INATTENTIVE TYPE: ICD-10-CM

## 2020-06-12 DIAGNOSIS — F41.9 ANXIETY: ICD-10-CM

## 2020-06-12 PROCEDURE — 99213 PR OFFICE/OUTPT VISIT, EST, LEVL III, 20-29 MIN: ICD-10-PCS | Mod: S$GLB,,, | Performed by: NURSE PRACTITIONER

## 2020-06-12 PROCEDURE — 93971 US UPPER EXTREMITY VEINS RIGHT: ICD-10-PCS | Mod: 26,RT,, | Performed by: RADIOLOGY

## 2020-06-12 PROCEDURE — 93971 EXTREMITY STUDY: CPT | Mod: 26,RT,, | Performed by: RADIOLOGY

## 2020-06-12 PROCEDURE — 93971 EXTREMITY STUDY: CPT | Mod: TC,RT

## 2020-06-12 PROCEDURE — 99213 OFFICE O/P EST LOW 20 MIN: CPT | Mod: S$GLB,,, | Performed by: NURSE PRACTITIONER

## 2020-06-12 PROCEDURE — 99999 PR PBB SHADOW E&M-EST. PATIENT-LVL II: CPT | Mod: PBBFAC,,, | Performed by: NURSE PRACTITIONER

## 2020-06-12 PROCEDURE — 99999 PR PBB SHADOW E&M-EST. PATIENT-LVL II: ICD-10-PCS | Mod: PBBFAC,,, | Performed by: NURSE PRACTITIONER

## 2020-06-12 RX ORDER — CITALOPRAM 20 MG/1
20 TABLET, FILM COATED ORAL DAILY
Qty: 90 TABLET | Refills: 3 | Status: SHIPPED | OUTPATIENT
Start: 2020-06-12 | End: 2020-09-04 | Stop reason: SDUPTHER

## 2020-06-12 RX ORDER — DEXTROAMPHETAMINE SACCHARATE, AMPHETAMINE ASPARTATE MONOHYDRATE, DEXTROAMPHETAMINE SULFATE AND AMPHETAMINE SULFATE 7.5; 7.5; 7.5; 7.5 MG/1; MG/1; MG/1; MG/1
30 CAPSULE, EXTENDED RELEASE ORAL EVERY MORNING
Qty: 30 CAPSULE | Refills: 0 | Status: SHIPPED | OUTPATIENT
Start: 2020-08-03 | End: 2020-09-04 | Stop reason: SDUPTHER

## 2020-06-12 RX ORDER — DEXTROAMPHETAMINE SACCHARATE, AMPHETAMINE ASPARTATE MONOHYDRATE, DEXTROAMPHETAMINE SULFATE AND AMPHETAMINE SULFATE 7.5; 7.5; 7.5; 7.5 MG/1; MG/1; MG/1; MG/1
30 CAPSULE, EXTENDED RELEASE ORAL EVERY MORNING
Qty: 30 CAPSULE | Refills: 0 | Status: SHIPPED | OUTPATIENT
Start: 2020-09-03 | End: 2020-09-04 | Stop reason: SDUPTHER

## 2020-06-12 RX ORDER — ALPRAZOLAM 0.25 MG/1
0.25 TABLET ORAL 2 TIMES DAILY PRN
Qty: 30 TABLET | Refills: 5 | Status: SHIPPED | OUTPATIENT
Start: 2020-06-12 | End: 2020-09-04 | Stop reason: SDUPTHER

## 2020-06-12 RX ORDER — DEXTROAMPHETAMINE SACCHARATE, AMPHETAMINE ASPARTATE MONOHYDRATE, DEXTROAMPHETAMINE SULFATE AND AMPHETAMINE SULFATE 7.5; 7.5; 7.5; 7.5 MG/1; MG/1; MG/1; MG/1
30 CAPSULE, EXTENDED RELEASE ORAL EVERY MORNING
Qty: 30 CAPSULE | Refills: 0 | Status: SHIPPED | OUTPATIENT
Start: 2020-07-03 | End: 2020-09-04 | Stop reason: SDUPTHER

## 2020-06-12 NOTE — PROGRESS NOTES
Outpatient Psychiatry Follow-Up Visit (MD/NP)    6/12/2020    Clinical Status of Patient:  Outpatient (Ambulatory)    Chief Complaint:  Ruma Guillen is a 39 y.o. female who presents today for follow-up of anxiety and attention problems.  Met with patient.      Last visit was: 3/13/2020. Chart and  reviewed    Interval History and Content of Current Session:  Current Psychiatric Medications/changes  · Continue Adderall XR 30 mg po daily (3-months Rx printed)  · Continue Celexa 20 mg po daily  · Continue Xanax 0.25 mg po BID PRN    Pt presents bright affect and euthymic mood. Planning to present virtually for a conference.  Coping well with stressors. Pt reports effective results from medications and denies side effects.   Denies SI/HI/AVH.  Will continue medications.      Psychotherapy:  · Target symptoms: distractability, anxiety , work stress  · Why chosen therapy is appropriate versus another modality: relevant to diagnosis  · Outcome monitoring methods: self-report  · Therapeutic intervention type: behavior modifying psychotherapy  · Topics discussed/themes: building skills sets for symptom management, symptom recognition  · The patient's response to the intervention is accepting. The patient's progress toward treatment goals is good.   · Duration of intervention: 11 minutes.    Review of Systems   · PSYCHIATRIC: Pertinant items are noted in the narrative.  · CONSTITUTIONAL: No weight gain or loss.   · MUSCULOSKELETAL: No pain or stiffness of the joints.  · NEUROLOGIC: No weakness, sensory changes, seizures, confusion, memory loss, tremor or other abnormal movements.  · ENDOCRINE: No polydipsia or polyuria.  · INTEGUMENTARY: No rashes or lacerations.  · EYES: No exophthalmos, jaundice or blindness.  · ENT: No dizziness, tinnitus or hearing loss.  · RESPIRATORY: No shortness of breath.  · CARDIOVASCULAR: No tachycardia or chest pain.  · GASTROINTESTINAL: No nausea, vomiting, pain, constipation or  diarrhea.  · GENITOURINARY: No frequency, dysuria or sexual dysfunction.  · HEMATOLOGIC/LYMPHATIC: No excessive bleeding, prolonged or excessive bleeding after dental extraction/injury.  · ALLERGIC/IMMUNOLOGIC: No allergic response to materials, foods or animals at this time.    Past Medical, Family and Social History: The patient's past medical, family and social history have been reviewed and updated as appropriate within the electronic medical record - see encounter notes.    Compliance: yes    Side effects: None    Risk Parameters:  Patient reports no suicidal ideation  Patient reports no homicidal ideation  Patient reports no self-injurious behavior  Patient reports no violent behavior    Exam (detailed: at least 9 elements; comprehensive: all 15 elements)   Constitutional  Vitals:  Most recent vital signs, dated greater than 90 days prior to this appointment, were reviewed.   There were no vitals filed for this visit.     General:  unremarkable, age appropriate     Musculoskeletal  Muscle Strength/Tone:  no tremor, no tic   Gait & Station:  non-ataxic     Psychiatric  Speech:  no latency; no press   Mood & Affect:  euthymic  congruent and appropriate   Thought Process:  normal and logical   Associations:  intact   Thought Content:  normal, no suicidality, no homicidality, delusions, or paranoia   Insight:  intact   Judgement: behavior is adequate to circumstances   Orientation:  grossly intact   Memory: intact for content of interview   Language: grossly intact   Attention Span & Concentration:  able to focus   Fund of Knowledge:  intact and appropriate to age and level of education     Assessment and Diagnosis   Status/Progress: Based on the examination today, the patient's problem(s) is/are improved and adequately but not ideally controlled.  New problems have not been presented today.   Co-morbidities and Lack of compliance are not complicating management of the primary condition.  There are no active  rule-out diagnoses for this patient at this time.     General Impression:       ICD-10-CM ICD-9-CM   1. ADHD (attention deficit hyperactivity disorder), inattentive type  F90.0 314.00   2. Anxiety  F41.9 300.00       Intervention/Counseling/Treatment Plan   · Medication Management: The risks and benefits of medication were discussed with the patient.  · Continue Adderall XR 30 mg po daily (3-months Rx printed)  · Continue Celexa 20 mg po daily  · Continue Xanax 0.25 mg po BID PRN    Return to Clinic: 6 months    Risks, benefits, side effects and alternative treatments discussed with patient. Patient agrees with the current plan as documented.  Encouraged Patient to keep future appointments.  Take medications as prescribed and abstain from substance abuse.  Pt to present to ED for thoughts to harm herself or others

## 2020-09-04 ENCOUNTER — OFFICE VISIT (OUTPATIENT)
Dept: PSYCHIATRY | Facility: CLINIC | Age: 40
End: 2020-09-04
Payer: COMMERCIAL

## 2020-09-04 VITALS
DIASTOLIC BLOOD PRESSURE: 73 MMHG | HEART RATE: 77 BPM | SYSTOLIC BLOOD PRESSURE: 123 MMHG | WEIGHT: 215.63 LBS | BODY MASS INDEX: 30.94 KG/M2

## 2020-09-04 DIAGNOSIS — F41.1 GENERALIZED ANXIETY DISORDER: ICD-10-CM

## 2020-09-04 DIAGNOSIS — F90.0 ADHD (ATTENTION DEFICIT HYPERACTIVITY DISORDER), INATTENTIVE TYPE: Primary | ICD-10-CM

## 2020-09-04 DIAGNOSIS — F41.9 ANXIETY: ICD-10-CM

## 2020-09-04 PROCEDURE — 99213 PR OFFICE/OUTPT VISIT, EST, LEVL III, 20-29 MIN: ICD-10-PCS | Mod: S$GLB,,, | Performed by: NURSE PRACTITIONER

## 2020-09-04 PROCEDURE — 3008F PR BODY MASS INDEX (BMI) DOCUMENTED: ICD-10-PCS | Mod: CPTII,S$GLB,, | Performed by: NURSE PRACTITIONER

## 2020-09-04 PROCEDURE — 99213 OFFICE O/P EST LOW 20 MIN: CPT | Mod: S$GLB,,, | Performed by: NURSE PRACTITIONER

## 2020-09-04 PROCEDURE — 3008F BODY MASS INDEX DOCD: CPT | Mod: CPTII,S$GLB,, | Performed by: NURSE PRACTITIONER

## 2020-09-04 PROCEDURE — 99999 PR PBB SHADOW E&M-EST. PATIENT-LVL III: CPT | Mod: PBBFAC,,, | Performed by: NURSE PRACTITIONER

## 2020-09-04 PROCEDURE — 99999 PR PBB SHADOW E&M-EST. PATIENT-LVL III: ICD-10-PCS | Mod: PBBFAC,,, | Performed by: NURSE PRACTITIONER

## 2020-09-04 RX ORDER — ALPRAZOLAM 0.25 MG/1
0.25 TABLET ORAL 2 TIMES DAILY PRN
Qty: 30 TABLET | Refills: 5 | Status: SHIPPED | OUTPATIENT
Start: 2020-09-04 | End: 2021-01-05 | Stop reason: SDUPTHER

## 2020-09-04 RX ORDER — CITALOPRAM 20 MG/1
20 TABLET, FILM COATED ORAL DAILY
Qty: 90 TABLET | Refills: 3 | Status: SHIPPED | OUTPATIENT
Start: 2020-09-04 | End: 2021-01-05 | Stop reason: SDUPTHER

## 2020-09-04 RX ORDER — DEXTROAMPHETAMINE SACCHARATE, AMPHETAMINE ASPARTATE MONOHYDRATE, DEXTROAMPHETAMINE SULFATE AND AMPHETAMINE SULFATE 7.5; 7.5; 7.5; 7.5 MG/1; MG/1; MG/1; MG/1
30 CAPSULE, EXTENDED RELEASE ORAL EVERY MORNING
Qty: 30 CAPSULE | Refills: 0 | Status: SHIPPED | OUTPATIENT
Start: 2020-10-04 | End: 2020-12-04 | Stop reason: SDUPTHER

## 2020-09-04 RX ORDER — DEXTROAMPHETAMINE SACCHARATE, AMPHETAMINE ASPARTATE MONOHYDRATE, DEXTROAMPHETAMINE SULFATE AND AMPHETAMINE SULFATE 7.5; 7.5; 7.5; 7.5 MG/1; MG/1; MG/1; MG/1
30 CAPSULE, EXTENDED RELEASE ORAL EVERY MORNING
Qty: 30 CAPSULE | Refills: 0 | Status: SHIPPED | OUTPATIENT
Start: 2020-11-04 | End: 2021-01-05 | Stop reason: SDUPTHER

## 2020-09-04 RX ORDER — DEXTROAMPHETAMINE SACCHARATE, AMPHETAMINE ASPARTATE MONOHYDRATE, DEXTROAMPHETAMINE SULFATE AND AMPHETAMINE SULFATE 7.5; 7.5; 7.5; 7.5 MG/1; MG/1; MG/1; MG/1
30 CAPSULE, EXTENDED RELEASE ORAL EVERY MORNING
Qty: 30 CAPSULE | Refills: 0 | Status: SHIPPED | OUTPATIENT
Start: 2020-09-04 | End: 2021-01-05 | Stop reason: SDUPTHER

## 2020-09-04 NOTE — PROGRESS NOTES
Outpatient Psychiatry Follow-Up Visit (MD/NP)    9/4/2020    Clinical Status of Patient:  Outpatient (Ambulatory)    Chief Complaint:  Ruma Guillen is a 40 y.o. female who presents today for follow-up of anxiety and attention problems.  Met with patient.      Last visit was: 6/12/2020. Chart and  reviewed    Interval History and Content of Current Session:  Current Psychiatric Medications/changes  · Continue Adderall XR 30 mg po daily (3-months Rx printed)  · Continue Celexa 20 mg po daily  · Continue Xanax 0.25 mg po BID PRN    Pt presents bright affect and euthymic mood. States that she has been more introspective and spending a lot of time thinking about taumatic life events. Coping well with stressors. Pt reports effective results from medications and denies side effects.   Denies unmanaged symptoms of depression, anxiety, or ADHD. Denies SI/HI/AVH.  Will continue medications.      Psychotherapy:  · Target symptoms: distractability, anxiety , work stress  · Why chosen therapy is appropriate versus another modality: relevant to diagnosis  · Outcome monitoring methods: self-report  · Therapeutic intervention type: behavior modifying psychotherapy  · Topics discussed/themes: building skills sets for symptom management, symptom recognition  · The patient's response to the intervention is accepting. The patient's progress toward treatment goals is good.   · Duration of intervention: 11 minutes.    Review of Systems   · PSYCHIATRIC: Pertinant items are noted in the narrative.  · CONSTITUTIONAL: No weight gain or loss.   · MUSCULOSKELETAL: No pain or stiffness of the joints.  · NEUROLOGIC: No weakness, sensory changes, seizures, confusion, memory loss, tremor or other abnormal movements.  · ENDOCRINE: No polydipsia or polyuria.  · INTEGUMENTARY: No rashes or lacerations.  · EYES: No exophthalmos, jaundice or blindness.  · ENT: No dizziness, tinnitus or hearing loss.  · RESPIRATORY: No shortness of  breath.  · CARDIOVASCULAR: No tachycardia or chest pain.  · GASTROINTESTINAL: No nausea, vomiting, pain, constipation or diarrhea.  · GENITOURINARY: No frequency, dysuria or sexual dysfunction.  · HEMATOLOGIC/LYMPHATIC: No excessive bleeding, prolonged or excessive bleeding after dental extraction/injury.  · ALLERGIC/IMMUNOLOGIC: No allergic response to materials, foods or animals at this time.    Past Medical, Family and Social History: The patient's past medical, family and social history have been reviewed and updated as appropriate within the electronic medical record - see encounter notes.    Compliance: yes    Side effects: None    Risk Parameters:  Patient reports no suicidal ideation  Patient reports no homicidal ideation  Patient reports no self-injurious behavior  Patient reports no violent behavior    Exam (detailed: at least 9 elements; comprehensive: all 15 elements)   Constitutional  Vitals:  Most recent vital signs, dated greater than 90 days prior to this appointment, were reviewed.   Vitals:    09/04/20 1509   BP: 123/73   Pulse: 77   Weight: 97.8 kg (215 lb 9.8 oz)        General:  unremarkable, age appropriate     Musculoskeletal  Muscle Strength/Tone:  no tremor, no tic   Gait & Station:  non-ataxic     Psychiatric  Speech:  no latency; no press   Mood & Affect:  euthymic  congruent and appropriate   Thought Process:  normal and logical   Associations:  intact   Thought Content:  normal, no suicidality, no homicidality, delusions, or paranoia   Insight:  intact   Judgement: behavior is adequate to circumstances   Orientation:  grossly intact   Memory: intact for content of interview   Language: grossly intact   Attention Span & Concentration:  able to focus   Fund of Knowledge:  intact and appropriate to age and level of education     Assessment and Diagnosis   Status/Progress: Based on the examination today, the patient's problem(s) is/are improved and adequately but not ideally controlled.  New  problems have not been presented today.   Co-morbidities and Lack of compliance are not complicating management of the primary condition.  There are no active rule-out diagnoses for this patient at this time.     General Impression:       ICD-10-CM ICD-9-CM   1. ADHD (attention deficit hyperactivity disorder), inattentive type  F90.0 314.00   2. Anxiety  F41.9 300.00   3. Generalized anxiety disorder  F41.1 300.02       Intervention/Counseling/Treatment Plan   · Medication Management: The risks and benefits of medication were discussed with the patient.  · Continue Adderall XR 30 mg po daily (3-months Rx printed)  · Continue Celexa 20 mg po daily  · Continue Xanax 0.25 mg po BID PRN    Return to Clinic: 6 months    Risks, benefits, side effects and alternative treatments discussed with patient. Patient agrees with the current plan as documented.  Encouraged Patient to keep future appointments.  Take medications as prescribed and abstain from substance abuse.  Pt to present to ED for thoughts to harm herself or others

## 2020-10-05 ENCOUNTER — PATIENT MESSAGE (OUTPATIENT)
Dept: ADMINISTRATIVE | Facility: HOSPITAL | Age: 40
End: 2020-10-05

## 2020-10-12 ENCOUNTER — OFFICE VISIT (OUTPATIENT)
Dept: OBSTETRICS AND GYNECOLOGY | Facility: CLINIC | Age: 40
End: 2020-10-12
Payer: COMMERCIAL

## 2020-10-12 VITALS
DIASTOLIC BLOOD PRESSURE: 80 MMHG | SYSTOLIC BLOOD PRESSURE: 118 MMHG | HEIGHT: 70 IN | BODY MASS INDEX: 31.29 KG/M2 | WEIGHT: 218.56 LBS

## 2020-10-12 DIAGNOSIS — Z12.4 CERVICAL CANCER SCREENING: Primary | ICD-10-CM

## 2020-10-12 PROCEDURE — 99999 PR PBB SHADOW E&M-EST. PATIENT-LVL III: ICD-10-PCS | Mod: PBBFAC,,, | Performed by: OBSTETRICS & GYNECOLOGY

## 2020-10-12 PROCEDURE — 87624 HPV HI-RISK TYP POOLED RSLT: CPT

## 2020-10-12 PROCEDURE — 88175 CYTOPATH C/V AUTO FLUID REDO: CPT

## 2020-10-12 PROCEDURE — 99385 PR PREVENTIVE VISIT,NEW,18-39: ICD-10-PCS | Mod: S$GLB,,, | Performed by: OBSTETRICS & GYNECOLOGY

## 2020-10-12 PROCEDURE — 99999 PR PBB SHADOW E&M-EST. PATIENT-LVL III: CPT | Mod: PBBFAC,,, | Performed by: OBSTETRICS & GYNECOLOGY

## 2020-10-12 PROCEDURE — 99385 PREV VISIT NEW AGE 18-39: CPT | Mod: S$GLB,,, | Performed by: OBSTETRICS & GYNECOLOGY

## 2020-10-12 RX ORDER — INFLUENZA A VIRUS A/NEBRASKA/14/2019 (H1N1) ANTIGEN (MDCK CELL DERIVED, PROPIOLACTONE INACTIVATED), INFLUENZA A VIRUS A/DELAWARE/39/2019 (H3N2) ANTIGEN (MDCK CELL DERIVED, PROPIOLACTONE INACTIVATED), INFLUENZA B VIRUS B/SINGAPORE/INFTT-16-0610/2016 ANTIGEN (MDCK CELL DERIVED, PROPIOLACTONE INACTIVATED), INFLUENZA B VIRUS B/DARWIN/7/2019 ANTIGEN (MDCK CELL DERIVED, PROPIOLACTONE INACTIVATED) 15; 15; 15; 15 UG/.5ML; UG/.5ML; UG/.5ML; UG/.5ML
INJECTION, SUSPENSION INTRAMUSCULAR
COMMUNITY
Start: 2020-09-26 | End: 2023-11-14

## 2020-10-12 RX ORDER — FERROUS SULFATE 325(65) MG
65 TABLET ORAL
COMMUNITY
End: 2023-11-14

## 2020-10-12 RX ORDER — CETIRIZINE HYDROCHLORIDE 10 MG/1
10 TABLET ORAL DAILY
COMMUNITY

## 2020-10-12 NOTE — PROGRESS NOTES
"Chief Complaint: Well Woman Exam     HPI:      Ruma Guillen is a 40 y.o. G0 who presents today for well woman exam.  LMP: Patient's last menstrual period was 2020.  Pt with pain during first day of menses. Specifically, patient denies abnormal vaginal bleeding, discharge, pelvic pain, urinary problems, or changes in appetite. Ms. Guillen is currently sexually active with a single male partner. She is currently using no method for contraception. She declines STD screening today.    Previous Pap:  NILM (2017)  T-C Score: 14.8%    Gardasil:has never had     Ms. Guillen confirms that she wears her seatbelt when riding in the car and does not text while driving.     OB History        0    Para   0    Term   0       0    AB   0    Living   0       SAB   0    TAB   0    Ectopic   0    Multiple   0    Live Births               Obstetric Comments   Menarche-              ROS:     GENERAL: Denies unintentional weight gain or weight loss. Feeling well overall.   SKIN: Denies rash or lesions.   HEENT: Denies headaches, or vision changes.   CARDIOVASCULAR: Denies palpitations or chest pain.   RESPIRATORY: Denies shortness of breath or dyspnea on exertion.  BREASTS: Denies pain, lumps, or nipple discharge.   ABDOMEN: Denies abdominal pain, constipation, diarrhea, nausea, vomiting, change in appetite.  URINARY: Denies frequency, dysuria, hematuria.  NEUROLOGIC: Denies syncope or weakness.   PSYCHIATRIC: Denies depression, anxiety or mood swings.    Physical Exam:      PHYSICAL EXAM:  /80   Ht 5' 10" (1.778 m)   Wt 99.2 kg (218 lb 9.4 oz)   LMP 2020   BMI 31.36 kg/m²   Body mass index is 31.36 kg/m².     APPEARANCE: Well nourished, well developed, in no acute distress.  PSYCH: Appropriate mood and affect.  SKIN: No acne or hirsutism  NECK: Neck symmetric without masses or thyromegaly  NODES: No inguinal, axillary, or supraclavicular lymph node enlargement  ABDOMEN: Soft.  No tenderness or " masses.    CARDIOVASCULAR: No edema of peripheral extremities  BREASTS: Symmetrical, no skin changes or visible lesions.  No palpable masses or nipple discharge bilaterally.  PELVIC: Normal external genitalia without lesions.  Normal hair distribution.  Adequate perineal body, normal urethral meatus.  Vagina moist and well rugated without lesions or discharge.  Cervix pink, without lesions, discharge or tenderness.  No significant cystocele or rectocele.  Bimanual exam shows uterus to be normal size, regular, mobile and nontender.  Adnexa without masses or tenderness.      Assessment/Plan:     Cervical cancer screening  -     HPV High Risk Genotypes, PCR  -     Liquid-Based Pap Smear, Screening        Follow up in about 1 year (around 10/12/2021) for Annual Exam.    Counseling:     Patient was counseled today on current ASCCP pap guidelines, the recommendation for yearly pelvic exams, healthy diet and exercise routines, breast self awareness.She is to see her PCP for other health maintenance.     Use of the [facility] Patient Portal discussed and encouraged during today's visit.

## 2020-10-23 LAB
HPV HR 12 DNA SPEC QL NAA+PROBE: NEGATIVE
HPV16 AG SPEC QL: NEGATIVE
HPV18 DNA SPEC QL NAA+PROBE: NEGATIVE

## 2020-11-06 LAB
FINAL PATHOLOGIC DIAGNOSIS: NORMAL
Lab: NORMAL

## 2020-11-10 ENCOUNTER — OFFICE VISIT (OUTPATIENT)
Dept: OPTOMETRY | Facility: CLINIC | Age: 40
End: 2020-11-10
Payer: COMMERCIAL

## 2020-11-10 DIAGNOSIS — H52.13 MYOPIA OF BOTH EYES WITH ASTIGMATISM AND PRESBYOPIA: Primary | ICD-10-CM

## 2020-11-10 DIAGNOSIS — Z46.0 FITTING AND ADJUSTMENT OF SPECTACLES AND CONTACT LENSES: Primary | ICD-10-CM

## 2020-11-10 DIAGNOSIS — H52.203 MYOPIA OF BOTH EYES WITH ASTIGMATISM AND PRESBYOPIA: Primary | ICD-10-CM

## 2020-11-10 DIAGNOSIS — H52.4 MYOPIA OF BOTH EYES WITH ASTIGMATISM AND PRESBYOPIA: Primary | ICD-10-CM

## 2020-11-10 PROCEDURE — 99999 PR PBB SHADOW E&M-EST. PATIENT-LVL I: ICD-10-PCS | Mod: PBBFAC,,, | Performed by: OPTOMETRIST

## 2020-11-10 PROCEDURE — 92310 CONTACT LENS FITTING OU: CPT | Mod: CSM,,, | Performed by: OPTOMETRIST

## 2020-11-10 PROCEDURE — 99999 PR PBB SHADOW E&M-EST. PATIENT-LVL II: ICD-10-PCS | Mod: PBBFAC,,, | Performed by: OPTOMETRIST

## 2020-11-10 PROCEDURE — 92004 PR EYE EXAM, NEW PATIENT,COMPREHESV: ICD-10-PCS | Mod: S$GLB,,, | Performed by: OPTOMETRIST

## 2020-11-10 PROCEDURE — 99999 PR PBB SHADOW E&M-EST. PATIENT-LVL I: CPT | Mod: PBBFAC,,, | Performed by: OPTOMETRIST

## 2020-11-10 PROCEDURE — 99999 PR PBB SHADOW E&M-EST. PATIENT-LVL II: CPT | Mod: PBBFAC,,, | Performed by: OPTOMETRIST

## 2020-11-10 PROCEDURE — 92004 COMPRE OPH EXAM NEW PT 1/>: CPT | Mod: S$GLB,,, | Performed by: OPTOMETRIST

## 2020-11-10 PROCEDURE — 92015 DETERMINE REFRACTIVE STATE: CPT | Mod: S$GLB,,, | Performed by: OPTOMETRIST

## 2020-11-10 PROCEDURE — 92015 PR REFRACTION: ICD-10-PCS | Mod: S$GLB,,, | Performed by: OPTOMETRIST

## 2020-11-10 PROCEDURE — 92310 PR CONTACT LENS FITTING (NO CHANGE): ICD-10-PCS | Mod: CSM,,, | Performed by: OPTOMETRIST

## 2020-11-10 NOTE — PROGRESS NOTES
HPI     DLS about 2 years ago with [facility].  Pt here today for routine and CLs exam. Pt currently wearing Sofmed toric   CL.  No f/f  thera eyes gtts  No eye sx    Last edited by Godwin Gamez, OD on 11/10/2020  9:29 AM. (History)        ROS     Negative for: Constitutional, Gastrointestinal, Neurological, Skin,   Genitourinary, Musculoskeletal, HENT, Endocrine, Cardiovascular, Eyes,   Respiratory, Psychiatric, Allergic/Imm, Heme/Lymph    Last edited by Godwin Gamez, OD on 11/10/2020  9:29 AM. (History)        Assessment /Plan     For exam results, see Encounter Report.    Myopia of both eyes with astigmatism and presbyopia      1. EWSCL--discussed IN DETAIL risks of EW!!  2. Pt wearing SOFMED BREATHABLES XW TORIC (BIOFINITY TORIC)--good fit/[facility]    PLAN:    1. Wrote new spex/CLRx  2. Start Daily Wear schedule.  NO SLEEPING IN CONTACT LENSES. Clean and disinfect nightly.  exchange monthly.    3. rtc 1 yr

## 2020-12-04 ENCOUNTER — PATIENT MESSAGE (OUTPATIENT)
Dept: PSYCHIATRY | Facility: CLINIC | Age: 40
End: 2020-12-04

## 2020-12-04 DIAGNOSIS — F90.0 ADHD (ATTENTION DEFICIT HYPERACTIVITY DISORDER), INATTENTIVE TYPE: ICD-10-CM

## 2020-12-04 RX ORDER — DEXTROAMPHETAMINE SACCHARATE, AMPHETAMINE ASPARTATE MONOHYDRATE, DEXTROAMPHETAMINE SULFATE AND AMPHETAMINE SULFATE 7.5; 7.5; 7.5; 7.5 MG/1; MG/1; MG/1; MG/1
30 CAPSULE, EXTENDED RELEASE ORAL EVERY MORNING
Qty: 30 CAPSULE | Refills: 0 | Status: SHIPPED | OUTPATIENT
Start: 2020-12-04 | End: 2021-01-05 | Stop reason: SDUPTHER

## 2021-01-05 ENCOUNTER — OFFICE VISIT (OUTPATIENT)
Dept: PSYCHIATRY | Facility: CLINIC | Age: 41
End: 2021-01-05
Payer: COMMERCIAL

## 2021-01-05 VITALS
BODY MASS INDEX: 32.19 KG/M2 | DIASTOLIC BLOOD PRESSURE: 65 MMHG | SYSTOLIC BLOOD PRESSURE: 123 MMHG | HEART RATE: 88 BPM | HEIGHT: 70 IN | WEIGHT: 224.88 LBS

## 2021-01-05 DIAGNOSIS — F90.0 ADHD (ATTENTION DEFICIT HYPERACTIVITY DISORDER), INATTENTIVE TYPE: ICD-10-CM

## 2021-01-05 DIAGNOSIS — F41.9 ANXIETY: ICD-10-CM

## 2021-01-05 PROCEDURE — 99213 OFFICE O/P EST LOW 20 MIN: CPT | Mod: S$GLB,,, | Performed by: NURSE PRACTITIONER

## 2021-01-05 PROCEDURE — 99999 PR PBB SHADOW E&M-EST. PATIENT-LVL III: ICD-10-PCS | Mod: PBBFAC,,, | Performed by: NURSE PRACTITIONER

## 2021-01-05 PROCEDURE — 3008F BODY MASS INDEX DOCD: CPT | Mod: CPTII,S$GLB,, | Performed by: NURSE PRACTITIONER

## 2021-01-05 PROCEDURE — 99213 PR OFFICE/OUTPT VISIT, EST, LEVL III, 20-29 MIN: ICD-10-PCS | Mod: S$GLB,,, | Performed by: NURSE PRACTITIONER

## 2021-01-05 PROCEDURE — 99999 PR PBB SHADOW E&M-EST. PATIENT-LVL III: CPT | Mod: PBBFAC,,, | Performed by: NURSE PRACTITIONER

## 2021-01-05 PROCEDURE — 3008F PR BODY MASS INDEX (BMI) DOCUMENTED: ICD-10-PCS | Mod: CPTII,S$GLB,, | Performed by: NURSE PRACTITIONER

## 2021-01-05 RX ORDER — DEXTROAMPHETAMINE SACCHARATE, AMPHETAMINE ASPARTATE MONOHYDRATE, DEXTROAMPHETAMINE SULFATE AND AMPHETAMINE SULFATE 7.5; 7.5; 7.5; 7.5 MG/1; MG/1; MG/1; MG/1
30 CAPSULE, EXTENDED RELEASE ORAL EVERY MORNING
Qty: 30 CAPSULE | Refills: 0 | Status: SHIPPED | OUTPATIENT
Start: 2021-02-05 | End: 2021-03-30 | Stop reason: SDUPTHER

## 2021-01-05 RX ORDER — CITALOPRAM 20 MG/1
20 TABLET, FILM COATED ORAL DAILY
Qty: 90 TABLET | Refills: 3 | Status: SHIPPED | OUTPATIENT
Start: 2021-01-05 | End: 2021-03-30 | Stop reason: SDUPTHER

## 2021-01-05 RX ORDER — ALPRAZOLAM 0.25 MG/1
0.25 TABLET ORAL 2 TIMES DAILY PRN
Qty: 30 TABLET | Refills: 5 | Status: SHIPPED | OUTPATIENT
Start: 2021-01-05 | End: 2021-03-30 | Stop reason: SDUPTHER

## 2021-01-05 RX ORDER — DEXTROAMPHETAMINE SACCHARATE, AMPHETAMINE ASPARTATE MONOHYDRATE, DEXTROAMPHETAMINE SULFATE AND AMPHETAMINE SULFATE 7.5; 7.5; 7.5; 7.5 MG/1; MG/1; MG/1; MG/1
30 CAPSULE, EXTENDED RELEASE ORAL EVERY MORNING
Qty: 30 CAPSULE | Refills: 0 | Status: SHIPPED | OUTPATIENT
Start: 2021-01-05 | End: 2021-03-30 | Stop reason: SDUPTHER

## 2021-01-05 RX ORDER — DEXTROAMPHETAMINE SACCHARATE, AMPHETAMINE ASPARTATE MONOHYDRATE, DEXTROAMPHETAMINE SULFATE AND AMPHETAMINE SULFATE 7.5; 7.5; 7.5; 7.5 MG/1; MG/1; MG/1; MG/1
30 CAPSULE, EXTENDED RELEASE ORAL EVERY MORNING
Qty: 30 CAPSULE | Refills: 0 | Status: SHIPPED | OUTPATIENT
Start: 2021-03-05 | End: 2021-03-30 | Stop reason: SDUPTHER

## 2021-03-02 ENCOUNTER — IMMUNIZATION (OUTPATIENT)
Dept: PHARMACY | Facility: CLINIC | Age: 41
End: 2021-03-02
Payer: COMMERCIAL

## 2021-03-02 DIAGNOSIS — Z23 NEED FOR VACCINATION: Primary | ICD-10-CM

## 2021-03-30 ENCOUNTER — OFFICE VISIT (OUTPATIENT)
Dept: PSYCHIATRY | Facility: CLINIC | Age: 41
End: 2021-03-30
Payer: COMMERCIAL

## 2021-03-30 ENCOUNTER — IMMUNIZATION (OUTPATIENT)
Dept: PHARMACY | Facility: CLINIC | Age: 41
End: 2021-03-30
Payer: COMMERCIAL

## 2021-03-30 DIAGNOSIS — Z23 NEED FOR VACCINATION: Primary | ICD-10-CM

## 2021-03-30 DIAGNOSIS — F41.9 ANXIETY: ICD-10-CM

## 2021-03-30 DIAGNOSIS — F90.0 ADHD (ATTENTION DEFICIT HYPERACTIVITY DISORDER), INATTENTIVE TYPE: Primary | ICD-10-CM

## 2021-03-30 DIAGNOSIS — F41.1 GENERALIZED ANXIETY DISORDER: ICD-10-CM

## 2021-03-30 PROCEDURE — 99213 OFFICE O/P EST LOW 20 MIN: CPT | Mod: S$GLB,,, | Performed by: NURSE PRACTITIONER

## 2021-03-30 PROCEDURE — 99213 PR OFFICE/OUTPT VISIT, EST, LEVL III, 20-29 MIN: ICD-10-PCS | Mod: S$GLB,,, | Performed by: NURSE PRACTITIONER

## 2021-03-30 PROCEDURE — 99999 PR PBB SHADOW E&M-EST. PATIENT-LVL II: ICD-10-PCS | Mod: PBBFAC,,, | Performed by: NURSE PRACTITIONER

## 2021-03-30 PROCEDURE — 99999 PR PBB SHADOW E&M-EST. PATIENT-LVL II: CPT | Mod: PBBFAC,,, | Performed by: NURSE PRACTITIONER

## 2021-03-30 RX ORDER — DEXTROAMPHETAMINE SACCHARATE, AMPHETAMINE ASPARTATE MONOHYDRATE, DEXTROAMPHETAMINE SULFATE AND AMPHETAMINE SULFATE 7.5; 7.5; 7.5; 7.5 MG/1; MG/1; MG/1; MG/1
30 CAPSULE, EXTENDED RELEASE ORAL EVERY MORNING
Qty: 30 CAPSULE | Refills: 0 | Status: SHIPPED | OUTPATIENT
Start: 2021-06-06 | End: 2021-10-08 | Stop reason: SDUPTHER

## 2021-03-30 RX ORDER — DEXTROAMPHETAMINE SACCHARATE, AMPHETAMINE ASPARTATE MONOHYDRATE, DEXTROAMPHETAMINE SULFATE AND AMPHETAMINE SULFATE 7.5; 7.5; 7.5; 7.5 MG/1; MG/1; MG/1; MG/1
30 CAPSULE, EXTENDED RELEASE ORAL EVERY MORNING
Qty: 30 CAPSULE | Refills: 0 | Status: SHIPPED | OUTPATIENT
Start: 2021-04-06 | End: 2021-10-08 | Stop reason: SDUPTHER

## 2021-03-30 RX ORDER — CITALOPRAM 20 MG/1
20 TABLET, FILM COATED ORAL DAILY
Qty: 90 TABLET | Refills: 3 | Status: SHIPPED | OUTPATIENT
Start: 2021-03-30 | End: 2021-10-08 | Stop reason: SDUPTHER

## 2021-03-30 RX ORDER — ALPRAZOLAM 0.25 MG/1
0.25 TABLET ORAL 2 TIMES DAILY PRN
Qty: 30 TABLET | Refills: 5 | Status: SHIPPED | OUTPATIENT
Start: 2021-03-30 | End: 2021-10-08 | Stop reason: SDUPTHER

## 2021-03-30 RX ORDER — DEXTROAMPHETAMINE SACCHARATE, AMPHETAMINE ASPARTATE MONOHYDRATE, DEXTROAMPHETAMINE SULFATE AND AMPHETAMINE SULFATE 7.5; 7.5; 7.5; 7.5 MG/1; MG/1; MG/1; MG/1
30 CAPSULE, EXTENDED RELEASE ORAL EVERY MORNING
Qty: 30 CAPSULE | Refills: 0 | Status: SHIPPED | OUTPATIENT
Start: 2021-05-06 | End: 2021-07-06 | Stop reason: SDUPTHER

## 2021-07-06 ENCOUNTER — PATIENT MESSAGE (OUTPATIENT)
Dept: PSYCHIATRY | Facility: CLINIC | Age: 41
End: 2021-07-06

## 2021-07-06 DIAGNOSIS — F90.0 ADHD (ATTENTION DEFICIT HYPERACTIVITY DISORDER), INATTENTIVE TYPE: ICD-10-CM

## 2021-07-06 RX ORDER — DEXTROAMPHETAMINE SACCHARATE, AMPHETAMINE ASPARTATE MONOHYDRATE, DEXTROAMPHETAMINE SULFATE AND AMPHETAMINE SULFATE 7.5; 7.5; 7.5; 7.5 MG/1; MG/1; MG/1; MG/1
30 CAPSULE, EXTENDED RELEASE ORAL EVERY MORNING
Qty: 30 CAPSULE | Refills: 0 | Status: SHIPPED | OUTPATIENT
Start: 2021-07-06 | End: 2021-08-06 | Stop reason: SDUPTHER

## 2021-07-07 ENCOUNTER — PATIENT MESSAGE (OUTPATIENT)
Dept: ADMINISTRATIVE | Facility: HOSPITAL | Age: 41
End: 2021-07-07

## 2021-08-06 ENCOUNTER — PATIENT MESSAGE (OUTPATIENT)
Dept: PSYCHIATRY | Facility: CLINIC | Age: 41
End: 2021-08-06

## 2021-09-07 ENCOUNTER — PATIENT MESSAGE (OUTPATIENT)
Dept: PSYCHIATRY | Facility: CLINIC | Age: 41
End: 2021-09-07

## 2021-10-08 ENCOUNTER — OFFICE VISIT (OUTPATIENT)
Dept: PSYCHIATRY | Facility: CLINIC | Age: 41
End: 2021-10-08
Payer: COMMERCIAL

## 2021-10-08 DIAGNOSIS — F90.0 ADHD (ATTENTION DEFICIT HYPERACTIVITY DISORDER), INATTENTIVE TYPE: ICD-10-CM

## 2021-10-08 DIAGNOSIS — F41.9 ANXIETY: ICD-10-CM

## 2021-10-08 PROCEDURE — 99213 OFFICE O/P EST LOW 20 MIN: CPT | Mod: 95,,, | Performed by: NURSE PRACTITIONER

## 2021-10-08 PROCEDURE — 1159F PR MEDICATION LIST DOCUMENTED IN MEDICAL RECORD: ICD-10-PCS | Mod: CPTII,95,, | Performed by: NURSE PRACTITIONER

## 2021-10-08 PROCEDURE — 99213 PR OFFICE/OUTPT VISIT, EST, LEVL III, 20-29 MIN: ICD-10-PCS | Mod: 95,,, | Performed by: NURSE PRACTITIONER

## 2021-10-08 PROCEDURE — 1159F MED LIST DOCD IN RCRD: CPT | Mod: CPTII,95,, | Performed by: NURSE PRACTITIONER

## 2021-10-08 PROCEDURE — 1160F PR REVIEW ALL MEDS BY PRESCRIBER/CLIN PHARMACIST DOCUMENTED: ICD-10-PCS | Mod: CPTII,95,, | Performed by: NURSE PRACTITIONER

## 2021-10-08 PROCEDURE — 1160F RVW MEDS BY RX/DR IN RCRD: CPT | Mod: CPTII,95,, | Performed by: NURSE PRACTITIONER

## 2021-10-08 RX ORDER — DEXTROAMPHETAMINE SACCHARATE, AMPHETAMINE ASPARTATE MONOHYDRATE, DEXTROAMPHETAMINE SULFATE AND AMPHETAMINE SULFATE 7.5; 7.5; 7.5; 7.5 MG/1; MG/1; MG/1; MG/1
30 CAPSULE, EXTENDED RELEASE ORAL EVERY MORNING
Qty: 30 CAPSULE | Refills: 0 | Status: SHIPPED | OUTPATIENT
Start: 2021-12-18 | End: 2022-01-03 | Stop reason: SDUPTHER

## 2021-10-08 RX ORDER — DEXTROAMPHETAMINE SACCHARATE, AMPHETAMINE ASPARTATE MONOHYDRATE, DEXTROAMPHETAMINE SULFATE AND AMPHETAMINE SULFATE 7.5; 7.5; 7.5; 7.5 MG/1; MG/1; MG/1; MG/1
30 CAPSULE, EXTENDED RELEASE ORAL EVERY MORNING
Qty: 30 CAPSULE | Refills: 0 | Status: SHIPPED | OUTPATIENT
Start: 2021-10-18 | End: 2022-01-03 | Stop reason: SDUPTHER

## 2021-10-08 RX ORDER — ALPRAZOLAM 0.25 MG/1
0.25 TABLET ORAL 2 TIMES DAILY PRN
Qty: 30 TABLET | Refills: 5 | Status: SHIPPED | OUTPATIENT
Start: 2021-10-08 | End: 2022-05-23 | Stop reason: SDUPTHER

## 2021-10-08 RX ORDER — CITALOPRAM 20 MG/1
20 TABLET, FILM COATED ORAL DAILY
Qty: 90 TABLET | Refills: 3 | Status: SHIPPED | OUTPATIENT
Start: 2021-10-08 | End: 2022-05-23 | Stop reason: SDUPTHER

## 2021-10-08 RX ORDER — DEXTROAMPHETAMINE SACCHARATE, AMPHETAMINE ASPARTATE MONOHYDRATE, DEXTROAMPHETAMINE SULFATE AND AMPHETAMINE SULFATE 7.5; 7.5; 7.5; 7.5 MG/1; MG/1; MG/1; MG/1
30 CAPSULE, EXTENDED RELEASE ORAL EVERY MORNING
Qty: 30 CAPSULE | Refills: 0 | Status: SHIPPED | OUTPATIENT
Start: 2021-11-18 | End: 2022-01-03 | Stop reason: SDUPTHER

## 2021-11-08 ENCOUNTER — HOSPITAL ENCOUNTER (OUTPATIENT)
Dept: RADIOLOGY | Facility: HOSPITAL | Age: 41
Discharge: HOME | End: 2021-11-08
Attending: INTERNAL MEDICINE
Payer: COMMERCIAL

## 2021-11-08 ENCOUNTER — OFFICE VISIT (OUTPATIENT)
Dept: INTERNAL MEDICINE | Facility: CLINIC | Age: 41
End: 2021-11-08
Payer: COMMERCIAL

## 2021-11-08 ENCOUNTER — IMMUNIZATION (OUTPATIENT)
Dept: INTERNAL MEDICINE | Facility: CLINIC | Age: 41
End: 2021-11-08
Payer: COMMERCIAL

## 2021-11-08 VITALS
WEIGHT: 233.94 LBS | TEMPERATURE: 98 F | HEART RATE: 83 BPM | BODY MASS INDEX: 33.49 KG/M2 | HEIGHT: 70 IN | SYSTOLIC BLOOD PRESSURE: 122 MMHG | DIASTOLIC BLOOD PRESSURE: 84 MMHG | OXYGEN SATURATION: 99 %

## 2021-11-08 DIAGNOSIS — M79.671 HEEL PAIN, CHRONIC, RIGHT: ICD-10-CM

## 2021-11-08 DIAGNOSIS — G89.29 HEEL PAIN, CHRONIC, RIGHT: ICD-10-CM

## 2021-11-08 DIAGNOSIS — Z12.83 SKIN CANCER SCREENING: ICD-10-CM

## 2021-11-08 DIAGNOSIS — F90.9 ATTENTION DEFICIT HYPERACTIVITY DISORDER (ADHD), UNSPECIFIED ADHD TYPE: ICD-10-CM

## 2021-11-08 DIAGNOSIS — F41.9 ANXIETY: ICD-10-CM

## 2021-11-08 DIAGNOSIS — Z00.00 ANNUAL PHYSICAL EXAM: Primary | ICD-10-CM

## 2021-11-08 DIAGNOSIS — Z13.89 ENCOUNTER FOR SURVEILLANCE OF ABNORMAL NEVI: ICD-10-CM

## 2021-11-08 DIAGNOSIS — D50.9 IRON DEFICIENCY ANEMIA, UNSPECIFIED IRON DEFICIENCY ANEMIA TYPE: ICD-10-CM

## 2021-11-08 PROCEDURE — 1160F RVW MEDS BY RX/DR IN RCRD: CPT | Mod: CPTII,S$GLB,, | Performed by: INTERNAL MEDICINE

## 2021-11-08 PROCEDURE — 90686 FLU VACCINE (QUAD) GREATER THAN OR EQUAL TO 3YO PRESERVATIVE FREE IM: ICD-10-PCS | Mod: S$GLB,,, | Performed by: INTERNAL MEDICINE

## 2021-11-08 PROCEDURE — 73630 X-RAY EXAM OF FOOT: CPT | Mod: TC,RT

## 2021-11-08 PROCEDURE — 3074F PR MOST RECENT SYSTOLIC BLOOD PRESSURE < 130 MM HG: ICD-10-PCS | Mod: CPTII,S$GLB,, | Performed by: INTERNAL MEDICINE

## 2021-11-08 PROCEDURE — 3079F PR MOST RECENT DIASTOLIC BLOOD PRESSURE 80-89 MM HG: ICD-10-PCS | Mod: CPTII,S$GLB,, | Performed by: INTERNAL MEDICINE

## 2021-11-08 PROCEDURE — 73630 X-RAY EXAM OF FOOT: CPT | Mod: 26,RT,, | Performed by: RADIOLOGY

## 2021-11-08 PROCEDURE — 1160F PR REVIEW ALL MEDS BY PRESCRIBER/CLIN PHARMACIST DOCUMENTED: ICD-10-PCS | Mod: CPTII,S$GLB,, | Performed by: INTERNAL MEDICINE

## 2021-11-08 PROCEDURE — 3008F PR BODY MASS INDEX (BMI) DOCUMENTED: ICD-10-PCS | Mod: CPTII,S$GLB,, | Performed by: INTERNAL MEDICINE

## 2021-11-08 PROCEDURE — 3074F SYST BP LT 130 MM HG: CPT | Mod: CPTII,S$GLB,, | Performed by: INTERNAL MEDICINE

## 2021-11-08 PROCEDURE — 73630 XR FOOT COMPLETE 3 VIEW RIGHT: ICD-10-PCS | Mod: 26,RT,, | Performed by: RADIOLOGY

## 2021-11-08 PROCEDURE — 99395 PREV VISIT EST AGE 18-39: CPT | Mod: 25,S$GLB,, | Performed by: INTERNAL MEDICINE

## 2021-11-08 PROCEDURE — 99999 PR PBB SHADOW E&M-EST. PATIENT-LVL V: CPT | Mod: PBBFAC,,, | Performed by: INTERNAL MEDICINE

## 2021-11-08 PROCEDURE — 99999 PR PBB SHADOW E&M-EST. PATIENT-LVL V: ICD-10-PCS | Mod: PBBFAC,,, | Performed by: INTERNAL MEDICINE

## 2021-11-08 PROCEDURE — 90471 IMMUNIZATION ADMIN: CPT | Mod: S$GLB,,, | Performed by: INTERNAL MEDICINE

## 2021-11-08 PROCEDURE — 90686 IIV4 VACC NO PRSV 0.5 ML IM: CPT | Mod: S$GLB,,, | Performed by: INTERNAL MEDICINE

## 2021-11-08 PROCEDURE — 3008F BODY MASS INDEX DOCD: CPT | Mod: CPTII,S$GLB,, | Performed by: INTERNAL MEDICINE

## 2021-11-08 PROCEDURE — 99395 PR PREVENTIVE VISIT,EST,18-39: ICD-10-PCS | Mod: 25,S$GLB,, | Performed by: INTERNAL MEDICINE

## 2021-11-08 PROCEDURE — 1159F MED LIST DOCD IN RCRD: CPT | Mod: CPTII,S$GLB,, | Performed by: INTERNAL MEDICINE

## 2021-11-08 PROCEDURE — 90471 FLU VACCINE (QUAD) GREATER THAN OR EQUAL TO 3YO PRESERVATIVE FREE IM: ICD-10-PCS | Mod: S$GLB,,, | Performed by: INTERNAL MEDICINE

## 2021-11-08 PROCEDURE — 3079F DIAST BP 80-89 MM HG: CPT | Mod: CPTII,S$GLB,, | Performed by: INTERNAL MEDICINE

## 2021-11-08 PROCEDURE — 1159F PR MEDICATION LIST DOCUMENTED IN MEDICAL RECORD: ICD-10-PCS | Mod: CPTII,S$GLB,, | Performed by: INTERNAL MEDICINE

## 2021-11-16 ENCOUNTER — OFFICE VISIT (OUTPATIENT)
Dept: DERMATOLOGY | Facility: CLINIC | Age: 41
End: 2021-11-16
Payer: COMMERCIAL

## 2021-11-16 DIAGNOSIS — L81.4 LENTIGO: Primary | ICD-10-CM

## 2021-11-16 DIAGNOSIS — I78.1 SPIDER VEINS: ICD-10-CM

## 2021-11-16 DIAGNOSIS — R22.9 MASS OF SKIN: ICD-10-CM

## 2021-11-16 DIAGNOSIS — D22.9 MULTIPLE BENIGN NEVI: ICD-10-CM

## 2021-11-16 DIAGNOSIS — D22.9 RECURRENT NEVUS: ICD-10-CM

## 2021-11-16 DIAGNOSIS — Z13.89 ENCOUNTER FOR SURVEILLANCE OF ABNORMAL NEVI: ICD-10-CM

## 2021-11-16 DIAGNOSIS — Z12.83 SKIN CANCER SCREENING: ICD-10-CM

## 2021-11-16 PROCEDURE — 99999 PR PBB SHADOW E&M-EST. PATIENT-LVL III: ICD-10-PCS | Mod: PBBFAC,,, | Performed by: DERMATOLOGY

## 2021-11-16 PROCEDURE — 1160F PR REVIEW ALL MEDS BY PRESCRIBER/CLIN PHARMACIST DOCUMENTED: ICD-10-PCS | Mod: CPTII,S$GLB,, | Performed by: DERMATOLOGY

## 2021-11-16 PROCEDURE — 3044F HG A1C LEVEL LT 7.0%: CPT | Mod: CPTII,S$GLB,, | Performed by: DERMATOLOGY

## 2021-11-16 PROCEDURE — 99204 PR OFFICE/OUTPT VISIT, NEW, LEVL IV, 45-59 MIN: ICD-10-PCS | Mod: S$GLB,,, | Performed by: DERMATOLOGY

## 2021-11-16 PROCEDURE — 1159F PR MEDICATION LIST DOCUMENTED IN MEDICAL RECORD: ICD-10-PCS | Mod: CPTII,S$GLB,, | Performed by: DERMATOLOGY

## 2021-11-16 PROCEDURE — 1159F MED LIST DOCD IN RCRD: CPT | Mod: CPTII,S$GLB,, | Performed by: DERMATOLOGY

## 2021-11-16 PROCEDURE — 3044F PR MOST RECENT HEMOGLOBIN A1C LEVEL <7.0%: ICD-10-PCS | Mod: CPTII,S$GLB,, | Performed by: DERMATOLOGY

## 2021-11-16 PROCEDURE — 1160F RVW MEDS BY RX/DR IN RCRD: CPT | Mod: CPTII,S$GLB,, | Performed by: DERMATOLOGY

## 2021-11-16 PROCEDURE — 99999 PR PBB SHADOW E&M-EST. PATIENT-LVL III: CPT | Mod: PBBFAC,,, | Performed by: DERMATOLOGY

## 2021-11-16 PROCEDURE — 99204 OFFICE O/P NEW MOD 45 MIN: CPT | Mod: S$GLB,,, | Performed by: DERMATOLOGY

## 2021-11-17 ENCOUNTER — OFFICE VISIT (OUTPATIENT)
Dept: PODIATRY | Facility: CLINIC | Age: 41
End: 2021-11-17
Payer: COMMERCIAL

## 2021-11-17 VITALS
WEIGHT: 233.94 LBS | HEART RATE: 76 BPM | BODY MASS INDEX: 33.56 KG/M2 | DIASTOLIC BLOOD PRESSURE: 80 MMHG | SYSTOLIC BLOOD PRESSURE: 122 MMHG

## 2021-11-17 DIAGNOSIS — Q66.221 METATARSUS ADDUCTUS OF RIGHT FOOT: ICD-10-CM

## 2021-11-17 DIAGNOSIS — M72.2 PLANTAR FASCIITIS: Primary | ICD-10-CM

## 2021-11-17 PROCEDURE — 1159F MED LIST DOCD IN RCRD: CPT | Mod: CPTII,S$GLB,, | Performed by: PODIATRIST

## 2021-11-17 PROCEDURE — 1159F PR MEDICATION LIST DOCUMENTED IN MEDICAL RECORD: ICD-10-PCS | Mod: CPTII,S$GLB,, | Performed by: PODIATRIST

## 2021-11-17 PROCEDURE — 3008F BODY MASS INDEX DOCD: CPT | Mod: CPTII,S$GLB,, | Performed by: PODIATRIST

## 2021-11-17 PROCEDURE — 3008F PR BODY MASS INDEX (BMI) DOCUMENTED: ICD-10-PCS | Mod: CPTII,S$GLB,, | Performed by: PODIATRIST

## 2021-11-17 PROCEDURE — 3079F PR MOST RECENT DIASTOLIC BLOOD PRESSURE 80-89 MM HG: ICD-10-PCS | Mod: CPTII,S$GLB,, | Performed by: PODIATRIST

## 2021-11-17 PROCEDURE — 3044F PR MOST RECENT HEMOGLOBIN A1C LEVEL <7.0%: ICD-10-PCS | Mod: CPTII,S$GLB,, | Performed by: PODIATRIST

## 2021-11-17 PROCEDURE — 3074F SYST BP LT 130 MM HG: CPT | Mod: CPTII,S$GLB,, | Performed by: PODIATRIST

## 2021-11-17 PROCEDURE — 99204 PR OFFICE/OUTPT VISIT, NEW, LEVL IV, 45-59 MIN: ICD-10-PCS | Mod: S$GLB,,, | Performed by: PODIATRIST

## 2021-11-17 PROCEDURE — 3044F HG A1C LEVEL LT 7.0%: CPT | Mod: CPTII,S$GLB,, | Performed by: PODIATRIST

## 2021-11-17 PROCEDURE — 99999 PR PBB SHADOW E&M-EST. PATIENT-LVL III: CPT | Mod: PBBFAC,,, | Performed by: PODIATRIST

## 2021-11-17 PROCEDURE — 3074F PR MOST RECENT SYSTOLIC BLOOD PRESSURE < 130 MM HG: ICD-10-PCS | Mod: CPTII,S$GLB,, | Performed by: PODIATRIST

## 2021-11-17 PROCEDURE — 99204 OFFICE O/P NEW MOD 45 MIN: CPT | Mod: S$GLB,,, | Performed by: PODIATRIST

## 2021-11-17 PROCEDURE — 99999 PR PBB SHADOW E&M-EST. PATIENT-LVL III: ICD-10-PCS | Mod: PBBFAC,,, | Performed by: PODIATRIST

## 2021-11-17 PROCEDURE — 3079F DIAST BP 80-89 MM HG: CPT | Mod: CPTII,S$GLB,, | Performed by: PODIATRIST

## 2021-11-17 RX ORDER — MELOXICAM 15 MG/1
15 TABLET ORAL DAILY
Qty: 20 TABLET | Refills: 0 | Status: SHIPPED | OUTPATIENT
Start: 2021-11-17 | End: 2021-12-07

## 2021-12-03 ENCOUNTER — PATIENT MESSAGE (OUTPATIENT)
Dept: PODIATRY | Facility: CLINIC | Age: 41
End: 2021-12-03
Payer: COMMERCIAL

## 2021-12-08 ENCOUNTER — OFFICE VISIT (OUTPATIENT)
Dept: PODIATRY | Facility: CLINIC | Age: 41
End: 2021-12-08
Payer: COMMERCIAL

## 2021-12-08 VITALS
SYSTOLIC BLOOD PRESSURE: 119 MMHG | HEART RATE: 79 BPM | WEIGHT: 237.88 LBS | BODY MASS INDEX: 34.13 KG/M2 | DIASTOLIC BLOOD PRESSURE: 82 MMHG

## 2021-12-08 DIAGNOSIS — M72.2 PLANTAR FASCIITIS: Primary | ICD-10-CM

## 2021-12-08 PROCEDURE — 99999 PR PBB SHADOW E&M-EST. PATIENT-LVL III: CPT | Mod: PBBFAC,,, | Performed by: PODIATRIST

## 2021-12-08 PROCEDURE — 99499 NO LOS: ICD-10-PCS | Mod: S$GLB,,, | Performed by: PODIATRIST

## 2021-12-08 PROCEDURE — 20550 NJX 1 TENDON SHEATH/LIGAMENT: CPT | Mod: S$GLB,,, | Performed by: PODIATRIST

## 2021-12-08 PROCEDURE — 99499 UNLISTED E&M SERVICE: CPT | Mod: S$GLB,,, | Performed by: PODIATRIST

## 2021-12-08 PROCEDURE — 20550 PR INJECT TENDON SHEATH/LIGAMENT: ICD-10-PCS | Mod: S$GLB,,, | Performed by: PODIATRIST

## 2021-12-08 PROCEDURE — 99999 PR PBB SHADOW E&M-EST. PATIENT-LVL III: ICD-10-PCS | Mod: PBBFAC,,, | Performed by: PODIATRIST

## 2021-12-08 RX ORDER — TRIAMCINOLONE ACETONIDE 40 MG/ML
40 INJECTION, SUSPENSION INTRA-ARTICULAR; INTRAMUSCULAR
Status: DISCONTINUED | OUTPATIENT
Start: 2021-12-08 | End: 2021-12-08 | Stop reason: HOSPADM

## 2021-12-08 RX ADMIN — TRIAMCINOLONE ACETONIDE 40 MG: 40 INJECTION, SUSPENSION INTRA-ARTICULAR; INTRAMUSCULAR at 08:12

## 2022-01-03 ENCOUNTER — OFFICE VISIT (OUTPATIENT)
Dept: PSYCHIATRY | Facility: CLINIC | Age: 42
End: 2022-01-03
Payer: COMMERCIAL

## 2022-01-03 DIAGNOSIS — F90.0 ADHD (ATTENTION DEFICIT HYPERACTIVITY DISORDER), INATTENTIVE TYPE: Primary | ICD-10-CM

## 2022-01-03 DIAGNOSIS — F41.9 ANXIETY: ICD-10-CM

## 2022-01-03 DIAGNOSIS — F41.1 GENERALIZED ANXIETY DISORDER: ICD-10-CM

## 2022-01-03 PROCEDURE — 99213 OFFICE O/P EST LOW 20 MIN: CPT | Mod: 95,,, | Performed by: NURSE PRACTITIONER

## 2022-01-03 PROCEDURE — 99213 PR OFFICE/OUTPT VISIT, EST, LEVL III, 20-29 MIN: ICD-10-PCS | Mod: 95,,, | Performed by: NURSE PRACTITIONER

## 2022-01-03 RX ORDER — DEXTROAMPHETAMINE SACCHARATE, AMPHETAMINE ASPARTATE MONOHYDRATE, DEXTROAMPHETAMINE SULFATE AND AMPHETAMINE SULFATE 7.5; 7.5; 7.5; 7.5 MG/1; MG/1; MG/1; MG/1
30 CAPSULE, EXTENDED RELEASE ORAL EVERY MORNING
Qty: 30 CAPSULE | Refills: 0 | Status: SHIPPED | OUTPATIENT
Start: 2022-01-21 | End: 2022-04-27 | Stop reason: SDUPTHER

## 2022-01-03 RX ORDER — DEXTROAMPHETAMINE SACCHARATE, AMPHETAMINE ASPARTATE MONOHYDRATE, DEXTROAMPHETAMINE SULFATE AND AMPHETAMINE SULFATE 7.5; 7.5; 7.5; 7.5 MG/1; MG/1; MG/1; MG/1
30 CAPSULE, EXTENDED RELEASE ORAL EVERY MORNING
Qty: 30 CAPSULE | Refills: 0 | Status: SHIPPED | OUTPATIENT
Start: 2022-02-21 | End: 2022-05-23 | Stop reason: SDUPTHER

## 2022-01-03 RX ORDER — DEXTROAMPHETAMINE SACCHARATE, AMPHETAMINE ASPARTATE MONOHYDRATE, DEXTROAMPHETAMINE SULFATE AND AMPHETAMINE SULFATE 7.5; 7.5; 7.5; 7.5 MG/1; MG/1; MG/1; MG/1
30 CAPSULE, EXTENDED RELEASE ORAL EVERY MORNING
Qty: 30 CAPSULE | Refills: 0 | Status: SHIPPED | OUTPATIENT
Start: 2022-03-21 | End: 2022-05-23 | Stop reason: SDUPTHER

## 2022-01-03 NOTE — PROGRESS NOTES
Outpatient Psychiatry Follow-Up Visit (MD/NP)    1/3/2022    Clinical Status of Patient:  Outpatient (Ambulatory)    Chief Complaint:  Ruma Guillen is a 41 y.o. female who presents today for follow-up of anxiety and attention problems.  Met with patient.      Last visit was: 10/08/2021. Chart and  reviewed  The patient location is: home  The chief complaint leading to consultation is: ADHD, anxiety    Visit type: audiovisual    Face to Face time with patient: 27 minutes  30 minutes of total time spent on the encounter, which includes face to face time and non-face to face time preparing to see the patient (eg, review of tests), Obtaining and/or reviewing separately obtained history, Documenting clinical information in the electronic or other health record, Independently interpreting results (not separately reported) and communicating results to the patient/family/caregiver, or Care coordination (not separately reported).     Each patient to whom he or she provides medical services by telemedicine is:  (1) informed of the relationship between the physician and patient and the respective role of any other health care provider with respect to management of the patient; and (2) notified that he or she may decline to receive medical services by telemedicine and may withdraw from such care at any time.    Interval History and Content of Current Session:  Current Psychiatric Medications/changes  · Continue Adderall XR 30 mg po daily (3-months Rx printed)  · Continue Celexa 20 mg po daily  · Continue Xanax 0.25 mg po BID PRN    Virtual Visit:  Reports no changes since last visit. Pt presents bright affect and euthymic mood. Pt reports effective results from medications and denies side effects.   Denies unmanaged symptoms of depression, anxiety, or ADHD. Denies SI/HI/AVH.  Will continue medications.      Psychotherapy:  · Target symptoms: distractability, anxiety , work stress  · Why chosen therapy is appropriate versus  another modality: relevant to diagnosis  · Outcome monitoring methods: self-report  · Therapeutic intervention type: behavior modifying psychotherapy  · Topics discussed/themes: building skills sets for symptom management, symptom recognition  · The patient's response to the intervention is accepting. The patient's progress toward treatment goals is good.   · Duration of intervention: 11 minutes.    Review of Systems   · PSYCHIATRIC: Pertinant items are noted in the narrative.  · CONSTITUTIONAL: No weight gain or loss.   · MUSCULOSKELETAL: No pain or stiffness of the joints.  · NEUROLOGIC: No weakness, sensory changes, seizures, confusion, memory loss, tremor or other abnormal movements.  · ENDOCRINE: No polydipsia or polyuria.  · INTEGUMENTARY: No rashes or lacerations.  · EYES: No exophthalmos, jaundice or blindness.  · ENT: No dizziness, tinnitus or hearing loss.  · RESPIRATORY: No shortness of breath.  · CARDIOVASCULAR: No tachycardia or chest pain.  · GASTROINTESTINAL: No nausea, vomiting, pain, constipation or diarrhea.  · GENITOURINARY: No frequency, dysuria or sexual dysfunction.  · HEMATOLOGIC/LYMPHATIC: No excessive bleeding, prolonged or excessive bleeding after dental extraction/injury.  · ALLERGIC/IMMUNOLOGIC: No allergic response to materials, foods or animals at this time.    Past Medical, Family and Social History: The patient's past medical, family and social history have been reviewed and updated as appropriate within the electronic medical record - see encounter notes.    Compliance: yes    Side effects: None    Risk Parameters:  Patient reports no suicidal ideation  Patient reports no homicidal ideation  Patient reports no self-injurious behavior  Patient reports no violent behavior    Exam (detailed: at least 9 elements; comprehensive: all 15 elements)   Constitutional  Vitals:  Most recent vital signs, dated greater than 90 days prior to this appointment, were reviewed.   There were no vitals  filed for this visit.     General:  unremarkable, age appropriate     Musculoskeletal  Muscle Strength/Tone:  no tremor, no tic   Gait & Station:  non-ataxic     Psychiatric  Speech:  no latency; no press   Mood & Affect:  euthymic  congruent and appropriate   Thought Process:  normal and logical   Associations:  intact   Thought Content:  normal, no suicidality, no homicidality, delusions, or paranoia   Insight:  intact   Judgement: behavior is adequate to circumstances   Orientation:  grossly intact   Memory: intact for content of interview   Language: grossly intact   Attention Span & Concentration:  able to focus   Fund of Knowledge:  intact and appropriate to age and level of education     Assessment and Diagnosis   Status/Progress: Based on the examination today, the patient's problem(s) is/are improved and adequately but not ideally controlled.  New problems have not been presented today.   Co-morbidities and Lack of compliance are not complicating management of the primary condition.  There are no active rule-out diagnoses for this patient at this time.     General Impression:       ICD-10-CM ICD-9-CM   1. ADHD (attention deficit hyperactivity disorder), inattentive type  F90.0 314.00   2. Anxiety  F41.9 300.00   3. Generalized anxiety disorder  F41.1 300.02       Intervention/Counseling/Treatment Plan   · Medication Management: The risks and benefits of medication were discussed with the patient.  · Continue Adderall XR 30 mg po daily (3-months Rx printed)  · Continue Celexa 20 mg po daily  · Continue Xanax 0.25 mg po BID PRN    Return to Clinic: 3 months    Risks, benefits, side effects and alternative treatments discussed with patient. Patient agrees with the current plan as documented.  Encouraged Patient to keep future appointments.  Take medications as prescribed and abstain from substance abuse.  Pt to present to ED for thoughts to harm herself or others

## 2022-01-11 ENCOUNTER — CLINICAL SUPPORT (OUTPATIENT)
Dept: REHABILITATION | Facility: HOSPITAL | Age: 42
End: 2022-01-11
Attending: PODIATRIST
Payer: COMMERCIAL

## 2022-01-11 DIAGNOSIS — M67.01 TIGHT HEELCORDS, ACQUIRED, RIGHT: ICD-10-CM

## 2022-01-11 DIAGNOSIS — R52 PAIN AGGRAVATED BY STANDING: ICD-10-CM

## 2022-01-11 DIAGNOSIS — Q66.221 METATARSUS ADDUCTUS OF RIGHT FOOT: ICD-10-CM

## 2022-01-11 DIAGNOSIS — M72.2 PLANTAR FASCIITIS: ICD-10-CM

## 2022-01-11 PROCEDURE — 97161 PT EVAL LOW COMPLEX 20 MIN: CPT | Mod: PO | Performed by: PHYSICAL THERAPIST

## 2022-01-11 NOTE — PROGRESS NOTES
[facility]   Physical Therapy Initial Evaluation     Date: 1/11/2022   Name: Ruma Guillen  Clinic Number: [id number]    Therapy Diagnosis:   Encounter Diagnoses   Name Primary?    Plantar fasciitis     Metatarsus adductus of right foot     Tight heelcords, acquired, right     Pain aggravated by standing      Physician: Erica Barker DPM    Physician Orders: PT Eval and Treat right foot  Medical Diagnosis from Referral:   Plantar fasciitis [M72.2], Metatarsus adductus of right foot [Q66.221]    Evaluation Date: 1/11/2022  Authorization Period Expiration: 11/17/2022  Plan of Care Expiration: 4/11/2022  Progress Note Due: 2/11/2022  Visit # / Visits authorized: 1/ 1   FOTO: 0/ 3     Precautions: Standard    Time In: 845  Time Out: 925  Total Appointment Time (timed & untimed codes): 40 minutes      SUBJECTIVE       History of current condition: Ruma reports no pain at the present. . She reports that the foot turns in and says MD said that maybe that could be corrected.     Date of onset: 6 months ago. Gradual onset. Presently improved. She has not encountered any pain since steroid injections last month.   Falls: 0  Pain:  Current 0/10, worst 6/10, best 0/10   Location: right foot, posterior aspect towards the heel    Description: Sharp  Aggravating Factors: with initial weight bearing after being off her feet for awhile and first thing in the AM.   Easing Factors: progressive mobility and ambulation and wearing shoes with heels   Sleep: no     Current Level of Function:   Personal - no limitation presently but was limited to dress   Domestic - no limitation at the present.   Community - no limitation   Occupation - no limitation   Sports/recreation/fitness - does not participate   Prior Level of Function: not limited   Prior Therapy: only wore braces as an infant.   Social History:   lives with their spouse in a single family double  Occupation: .     Patients goals: to  ideally fix the foot where there is no more pain.   Imaging, X-Rays : 11/8/2021   Mild hallux valgus.  No evidence of acute fracture or dislocation.  Normal mineralization.  Joint spaces are maintained.  Spurring of the inferior calcaneus.  No soft tissue abnormality        Medical History:   Past Medical History:   Diagnosis Date    Anxiety     Headache, migraine     Hx of psychiatric care     KASSIDY (iron deficiency anemia)     Psychiatric problem     Seizures     childhood seizures - last seizure >28 yrs.    Sleep difficulties     Therapy        Surgical History:   Ruma Guillen  has a past surgical history that includes [place] tooth extraction and Appendectomy (03/2020).    Medications:   Ruma has a current medication list which includes the following prescription(s): alprazolam, aspirin-acetaminophen-caffeine 250-250-65 mg, cetirizine, citalopram, dextroamphetamine-amphetamine, [START ON 2/21/2022] dextroamphetamine-amphetamine, [START ON 3/21/2022] dextroamphetamine-amphetamine, ferrous sulfate, and flucelvax quad [id number] (pf).    Allergies:   Review of patient's allergies indicates:  No Known Allergies       OBJECTIVE     Posture: bilateral hallux valgus primarily at the distal phalanx with bilateral foot pronation R>L and slight calcaneal eversion.   Palpation: unremarkable for pain   Sensation: intact to light touch        Range of Motion/Strength:    Ankle  Right  Pain/Dysfunction    AROM PROM MMT    Plantarflexion 50 55 4-/5    Dorsiflexion  Knee ext   10   15 4-/5    Inversion (foot)  Calcaneal  40 50 4-/5    Eversion (foot)  Calcaneal  30 35 5/5        Strength: Hip    Right   Iliopsoas 4/5   PGM 4-/5   Ext 4-/5     Joint Mobility: normal talocrual, tibiotalar, calcaneonavicular and calcaneocuboid mobility. Npo limitation in mid-tarsal mobility.       Gait: none.  Level of Assistance: independent  Patient displays hip ER of the right hip at swing phase and heel strike.   Balance: Maintains SLS 10  seconds with eyes open BLE     Limitation/Restriction for FOTO IE ankle / foot Survey - not performed          TREATMENT     Total Treatment time (time-based codes) separate from Evaluation: 0 minutes       PATIENT EDUCATION AND HOME EXERCISES     Education provided:   - Discussed how mobility dysfunction in one area can have effects in other areas relative to movement, muscle timing and weakness.     Written Home Exercises Provided: NO . Exercises were reviewed and Ruma was able to demonstrate them prior to the end of the session.  Ruma demonstrated good  understanding of the education provided. See EMR under Patient Instructions for exercises provided during therapy sessions.    ASSESSMENT     Ruma is a 41 y.o. female referred to outpatient Physical Therapy with a medical diagnosis of Plantar fasciitis [M72.2], Metatarsus adductus of right foot.  Patient presents with clinical findings of heel cord tightness limiting dorsiflexion and diminished strength of the leg musculature.     Patient prognosis is Good.   Patientt will benefit from skilled outpatient Physical Therapy to address the deficits stated above and in the chart below, provide patient /family education, and to maximize patientt's level of independence.     Plan of care discussed with patient: Yes  Patient's spiritual, cultural and educational needs considered and patient is agreeable to the plan of care and goals as stated below:     Anticipated Barriers for therapy: none     Medical Necessity is demonstrated by the following  History  Co-morbidities and personal factors that may impact the plan of care Co-morbidities:   Past Medical History:   Diagnosis Date    Anxiety     Headache, migraine     Hx of psychiatric care     KASSIDY (iron deficiency anemia)     Psychiatric problem     Seizures     childhood seizures - last seizure >28 yrs.    Sleep difficulties     Therapy        Personal Factors:   coping style     low   Examination  Body  Structures and Functions, activity limitations and participation restrictions that may impact the plan of care Body Regions:   lower extremities  right foot     Body Systems:    musculoskeletal    Participation Restrictions:   None    Activity limitations:   Learning and applying knowledge  no deficits    General Tasks and Commands  no deficits    Communication  no deficits    Mobility  no deficits    Self care  no deficits    Domestic Life  prolonged standing     Interactions/Relationships  no deficits    Life Areas  employment  no deficits    Community and Social Life  community life  recreation and leisure         low   Clinical Presentation stable and uncomplicated low   Decision Making/ Complexity Score: low     Goals:    Ankle / Lower extremity  Short Term Goals: 5 weeks   1. Pts pain decreased 20 - 40% for improved functional mobility and quality of life ONGOING  2. Pts PROM in dorsiflexion increased by 5 degrees to enhance AROM and functional mobility  ONGOING  3. Pts strength in the right leg musculature increased by 1/2 grade to facilitate improved active mobility and functional stability ONGOING  4. Pt to exhibit correct return demonstration of exercises for self-management and independence with HEP ONGOING  5. Pt to demonstrate enhanced neuromuscular response  with single leg static balance for improved functional mobility and gait pattern  ONGOING    Long Term Goals : 10 weeks   1.  Pts pain level decreased to 75% or greater with first out of bed for restoring functional mobility and ADL. ONGOING  2. Pts AROM in dorsiflexion increased by 5 to 10  degrees to restore functional mobility and ADL  ONGOING  3. Pts strength in the right leg musculature increased by one grade to facilitate improved active mobility and functional stability  ONGOING  4. Pt will be independent with HEP for self-management. ONGOING   5. Pt to exhibit dynamic stability on the RLE / LLE for stable functional mobility and gait.  ONGOING     PLAN   Plan of care Certification: 1/11/2022 to 4/11/2022.    Outpatient Physical Therapy 2 times weekly for 10 weeks to include the following interventions: Manual Therapy, Neuromuscular Re-ed, Patient Education and Therapeutic Exercise.     Nahun Barcenas, PT      I CERTIFY THE NEED FOR THESE SERVICES FURNISHED UNDER THIS PLAN OF TREATMENT AND WHILE UNDER MY CARE   Physician's comments:     Physician's Signature: ___________________________________________________

## 2022-01-19 ENCOUNTER — CLINICAL SUPPORT (OUTPATIENT)
Dept: REHABILITATION | Facility: HOSPITAL | Age: 42
End: 2022-01-19
Attending: PODIATRIST
Payer: COMMERCIAL

## 2022-01-19 DIAGNOSIS — R29.898 IMPAIRED STRENGTH OF LOWER EXTREMITY: ICD-10-CM

## 2022-01-19 DIAGNOSIS — M25.674 DECREASED RANGE OF MOTION OF RIGHT FOOT: ICD-10-CM

## 2022-01-19 DIAGNOSIS — M79.671 RIGHT FOOT PAIN: Primary | ICD-10-CM

## 2022-01-19 PROCEDURE — 97110 THERAPEUTIC EXERCISES: CPT | Mod: PO

## 2022-01-19 NOTE — PROGRESS NOTES
"[facility]   Physical Therapy Treatment Note     Name: Ruma Guillen  Clinic Number: [id number]    Therapy Diagnosis:   Encounter Diagnoses   Name Primary?    Right foot pain Yes    Impaired strength of lower extremity     Decreased range of motion of right foot      Physician: Erica Barker DPM    Visit Date: 1/19/2022    Physician Orders: PT Eval and Treat  Medical Diagnosis: Plantar fascial fibromatosis [M72.2], Congenital metatarsus adductus, right foot [Q66.221]  Evaluation Date: 1/11/2022  Authorization Period Expiration: 6/1/2022  Plan of Care Certification Period: please refer to initial eval to update this  Progress Note Due: 2/11/2022    Visit # / Visits authorized: 1/ 20 + eval   FOTO: NP today/1   PTA Visit #: 0/5     Precautions: Standard    Time In: 8:20 am (patient arrived late)  Time Out: 9:00  Total Billable Time: 40 minutes      SUBJECTIVE     Pt reports: no pain in the foot today.  She was not provided with home exercise program.  Response to previous treatment: IE performed   Functional change: ongoing    Pain: 0/10  Location: right feet        OBJECTIVE     Objective measures taken at progress report unless specified otherwise.    TREATMENT      BOLD INDICATES ACTIVITIES PERFORMED    Ruma received the treatments listed below:    THERAPEUTIC EXERCISES to develop  strength, ROM and flexibility for 40 minutes including   -supine hamstring stretch 3x30"   -sidelying inversion/eversion x20 ea.  -gastroc stretch on incline 3x30" B  -soleus stretch on incline 3x30" R  -marble pickups  -towel crunches x2 min  -toe yoga x2 min  -seated heel raises x30  -ankle rocks x2 min  -clamshells x20 B  -sidelying hip abduction x20 B  -bridges x20      MANUAL THERAPY TECHNIQUES  were applied for 0 minutes.        PATIENT EDUCATION AND HOME EXERCISES     Home Exercises Provided and Patient Education Provided     Education provided:   - none provided today    Written Home Exercises " Provided: not provided today. Exercises were reviewed and Ruma was able to demonstrate them prior to the end of the session.  Ruma demonstrated good  understanding of the education provided. See EMR under Patient Instructions for exercises provided during therapy sessions    ASSESSMENT     Pt is doing well today with minimal foot pain. She performs all activities noted above well without reports of pain or difficulty. She will continue to benefit from PT for ROM and strengthening activities for full return to PLOF. She does note some L hip pain that has been present for the last 1-2 weeks that is not exacerbated by any of the activities performed today.    Ruma Is progressing towards her goals.   Pt prognosis is Good.     Pt will continue to benefit from skilled outpatient physical therapy to address the deficits listed in the problem list box on initial evaluation, provide pt/family education and to maximize pt's level of independence in the home and community environment.     Pt's spiritual, cultural and educational needs considered and pt agreeable to plan of care and goals.     Anticipated barriers to physical therapy: scheduling    Goals:   Ankle / Lower extremity  Short Term Goals: 5 weeks   1. Pts pain decreased 20 - 40% for improved functional mobility and quality of life ONGOING  2. Pts PROM in dorsiflexion increased by 5 degrees to enhance AROM and functional mobility  ONGOING  3. Pts strength in the right leg musculature increased by 1/2 grade to facilitate improved active mobility and functional stability ONGOING  4. Pt to exhibit correct return demonstration of exercises for self-management and independence with HEP ONGOING  5. Pt to demonstrate enhanced neuromuscular response  with single leg static balance for improved functional mobility and gait pattern  ONGOING     Long Term Goals : 10 weeks   1.  Pts pain level decreased to 75% or greater with first out of bed for restoring functional mobility  and ADL. ONGOING  2. Pts AROM in dorsiflexion increased by 5 to 10  degrees to restore functional mobility and ADL  ONGOING  3. Pts strength in the right leg musculature increased by one grade to facilitate improved active mobility and functional stability  ONGOING  4. Pt will be independent with HEP for self-management. ONGOING   5. Pt to exhibit dynamic stability on the RLE / LLE for stable functional mobility and gait. ONGOING     PLAN     Cont POC.     Jodi Dela Cruz, PT

## 2022-01-20 PROBLEM — R29.898 IMPAIRED STRENGTH OF LOWER EXTREMITY: Status: ACTIVE | Noted: 2022-01-20

## 2022-01-20 PROBLEM — M25.674 DECREASED RANGE OF MOTION OF RIGHT FOOT: Status: ACTIVE | Noted: 2022-01-20

## 2022-01-20 PROBLEM — M79.673 FOOT PAIN: Status: ACTIVE | Noted: 2022-01-20

## 2022-01-21 PROBLEM — R52 PAIN AGGRAVATED BY STANDING: Status: ACTIVE | Noted: 2022-01-21

## 2022-01-21 PROBLEM — M67.01 TIGHT HEELCORDS, ACQUIRED, RIGHT: Status: ACTIVE | Noted: 2022-01-21

## 2022-01-21 NOTE — PLAN OF CARE
[facility]   Physical Therapy Initial Evaluation     Date: 1/11/2022   Name: Ruma Guillen  Clinic Number: [id number]    Therapy Diagnosis:   Encounter Diagnoses   Name Primary?    Plantar fasciitis     Metatarsus adductus of right foot     Tight heelcords, acquired, right     Pain aggravated by standing      Physician: Erica Barker DPM    Physician Orders: PT Eval and Treat right foot  Medical Diagnosis from Referral:   Plantar fasciitis [M72.2], Metatarsus adductus of right foot [Q66.221]    Evaluation Date: 1/11/2022  Authorization Period Expiration: 11/17/2022  Plan of Care Expiration: 4/11/2022  Progress Note Due: 2/11/2022  Visit # / Visits authorized: 1/ 1   FOTO: 0/ 3     Precautions: Standard    Time In: 845  Time Out: 925  Total Appointment Time (timed & untimed codes): 40 minutes      SUBJECTIVE       History of current condition: Ruma reports no pain at the present. . She reports that the foot turns in and says MD said that maybe that could be corrected.     Date of onset: 6 months ago. Gradual onset. Presently improved. She has not encountered any pain since steroid injections last month.   Falls: 0  Pain:  Current 0/10, worst 6/10, best 0/10   Location: right foot, posterior aspect towards the heel    Description: Sharp  Aggravating Factors: with initial weight bearing after being off her feet for awhile and first thing in the AM.   Easing Factors: progressive mobility and ambulation and wearing shoes with heels   Sleep: no     Current Level of Function:   Personal - no limitation presently but was limited to dress   Domestic - no limitation at the present.   Community - no limitation   Occupation - no limitation   Sports/recreation/fitness - does not participate   Prior Level of Function: not limited   Prior Therapy: only wore braces as an infant.   Social History:   lives with their spouse in a single family double  Occupation: .     Patients goals: to  ideally fix the foot where there is no more pain.   Imaging, X-Rays : 11/8/2021   Mild hallux valgus.  No evidence of acute fracture or dislocation.  Normal mineralization.  Joint spaces are maintained.  Spurring of the inferior calcaneus.  No soft tissue abnormality        Medical History:   Past Medical History:   Diagnosis Date    Anxiety     Headache, migraine     Hx of psychiatric care     KASSIDY (iron deficiency anemia)     Psychiatric problem     Seizures     childhood seizures - last seizure >28 yrs.    Sleep difficulties     Therapy        Surgical History:   Ruma Guillen  has a past surgical history that includes [place] tooth extraction and Appendectomy (03/2020).    Medications:   Ruma has a current medication list which includes the following prescription(s): alprazolam, aspirin-acetaminophen-caffeine 250-250-65 mg, cetirizine, citalopram, dextroamphetamine-amphetamine, [START ON 2/21/2022] dextroamphetamine-amphetamine, [START ON 3/21/2022] dextroamphetamine-amphetamine, ferrous sulfate, and flucelvax quad 8844-6700 (pf).    Allergies:   Review of patient's allergies indicates:  No Known Allergies       OBJECTIVE     Posture: bilateral hallux valgus primarily at the distal phalanx with bilateral foot pronation R>L and slight calcaneal eversion.   Palpation: unremarkable for pain   Sensation: intact to light touch        Range of Motion/Strength:    Ankle  Right  Pain/Dysfunction    AROM PROM MMT    Plantarflexion 50 55 4-/5    Dorsiflexion  Knee ext   10   15 4-/5    Inversion (foot)  Calcaneal  40 50 4-/5    Eversion (foot)  Calcaneal  30 35 5/5        Strength: Hip    Right   Iliopsoas 4/5   PGM 4-/5   Ext 4-/5     Joint Mobility: normal talocrual, tibiotalar, calcaneonavicular and calcaneocuboid mobility. Npo limitation in mid-tarsal mobility.       Gait: none.  Level of Assistance: independent  Patient displays hip ER of the right hip at swing phase and heel strike.   Balance: Maintains SLS 10  seconds with eyes open BLE     Limitation/Restriction for FOTO IE ankle / foot Survey - not performed          TREATMENT     Total Treatment time (time-based codes) separate from Evaluation: 0 minutes       PATIENT EDUCATION AND HOME EXERCISES     Education provided:   - Discussed how mobility dysfunction in one area can have effects in other areas relative to movement, muscle timing and weakness.     Written Home Exercises Provided: NO . Exercises were reviewed and Ruma was able to demonstrate them prior to the end of the session.  Ruma demonstrated good  understanding of the education provided. See EMR under Patient Instructions for exercises provided during therapy sessions.    ASSESSMENT     Ruma is a 41 y.o. female referred to outpatient Physical Therapy with a medical diagnosis of Plantar fasciitis [M72.2], Metatarsus adductus of right foot.  Patient presents with clinical findings of heel cord tightness limiting dorsiflexion and diminished strength of the leg musculature.     Patient prognosis is Good.   Patientt will benefit from skilled outpatient Physical Therapy to address the deficits stated above and in the chart below, provide patient /family education, and to maximize patientt's level of independence.     Plan of care discussed with patient: Yes  Patient's spiritual, cultural and educational needs considered and patient is agreeable to the plan of care and goals as stated below:     Anticipated Barriers for therapy: none     Medical Necessity is demonstrated by the following  History  Co-morbidities and personal factors that may impact the plan of care Co-morbidities:   Past Medical History:   Diagnosis Date    Anxiety     Headache, migraine     Hx of psychiatric care     KASSIDY (iron deficiency anemia)     Psychiatric problem     Seizures     childhood seizures - last seizure >28 yrs.    Sleep difficulties     Therapy        Personal Factors:   coping style     low   Examination  Body  Structures and Functions, activity limitations and participation restrictions that may impact the plan of care Body Regions:   lower extremities  right foot     Body Systems:    musculoskeletal    Participation Restrictions:   None    Activity limitations:   Learning and applying knowledge  no deficits    General Tasks and Commands  no deficits    Communication  no deficits    Mobility  no deficits    Self care  no deficits    Domestic Life  prolonged standing     Interactions/Relationships  no deficits    Life Areas  employment  no deficits    Community and Social Life  community life  recreation and leisure         low   Clinical Presentation stable and uncomplicated low   Decision Making/ Complexity Score: low     Goals:    Ankle / Lower extremity  Short Term Goals: 5 weeks   1. Pts pain decreased 20 - 40% for improved functional mobility and quality of life ONGOING  2. Pts PROM in dorsiflexion increased by 5 degrees to enhance AROM and functional mobility  ONGOING  3. Pts strength in the right leg musculature increased by 1/2 grade to facilitate improved active mobility and functional stability ONGOING  4. Pt to exhibit correct return demonstration of exercises for self-management and independence with HEP ONGOING  5. Pt to demonstrate enhanced neuromuscular response  with single leg static balance for improved functional mobility and gait pattern  ONGOING    Long Term Goals : 10 weeks   1.  Pts pain level decreased to 75% or greater with first out of bed for restoring functional mobility and ADL. ONGOING  2. Pts AROM in dorsiflexion increased by 5 to 10  degrees to restore functional mobility and ADL  ONGOING  3. Pts strength in the right leg musculature increased by one grade to facilitate improved active mobility and functional stability  ONGOING  4. Pt will be independent with HEP for self-management. ONGOING   5. Pt to exhibit dynamic stability on the RLE / LLE for stable functional mobility and gait.  ONGOING     PLAN   Plan of care Certification: 1/11/2022 to 4/11/2022.    Outpatient Physical Therapy 2 times weekly for 10 weeks to include the following interventions: Manual Therapy, Neuromuscular Re-ed, Patient Education and Therapeutic Exercise.     Nahun Barcenas, PT      I CERTIFY THE NEED FOR THESE SERVICES FURNISHED UNDER THIS PLAN OF TREATMENT AND WHILE UNDER MY CARE   Physician's comments:     Physician's Signature: ___________________________________________________

## 2022-01-24 ENCOUNTER — CLINICAL SUPPORT (OUTPATIENT)
Dept: REHABILITATION | Facility: HOSPITAL | Age: 42
End: 2022-01-24
Attending: PODIATRIST
Payer: COMMERCIAL

## 2022-01-24 DIAGNOSIS — M79.671 RIGHT FOOT PAIN: ICD-10-CM

## 2022-01-24 DIAGNOSIS — M25.674 DECREASED RANGE OF MOTION OF RIGHT FOOT: ICD-10-CM

## 2022-01-24 DIAGNOSIS — R52 PAIN AGGRAVATED BY STANDING: ICD-10-CM

## 2022-01-24 DIAGNOSIS — M67.01 TIGHT HEELCORDS, ACQUIRED, RIGHT: ICD-10-CM

## 2022-01-24 DIAGNOSIS — R29.898 IMPAIRED STRENGTH OF LOWER EXTREMITY: ICD-10-CM

## 2022-01-24 PROCEDURE — 97110 THERAPEUTIC EXERCISES: CPT | Mod: PO

## 2022-01-24 NOTE — PROGRESS NOTES
"[facility]   Physical Therapy Treatment Note     Name: Ruma Guillen  Clinic Number: [id number]    Therapy Diagnosis:   Encounter Diagnoses   Name Primary?   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   She was not provided with home exercise program.  Response to previous treatment: WNL  Functional change: ongoing    Pain: 0/10  Location: right foot        OBJECTIVE     Objective measures taken at progress report unless specified otherwise.    TREATMENT      BOLD INDICATES ACTIVITIES PERFORMED    Ruma received the treatments listed below:    THERAPEUTIC EXERCISES to develop  strength, ROM and flexibility for 30minutes including   -soleus stretch on incline 3x30" R  -supine hamstring stretch 3x30"   -sidelying inversion/eversion x20 ea. Added 2# AW  -gastroc stretch on incline 3x30" B    -bridges x20  -marble pickups x2 reps   -towel crunches x2 min  -toe yoga x2 min  -seated heel raises x30 added 10lb KB ontop of knee  NP-ankle rocks x2 min  -clamshells x20 B  -sidelying hip abduction x20 B        MANUAL THERAPY TECHNIQUES  were applied for 0 minutes.        PATIENT EDUCATION AND HOME EXERCISES     Home Exercises Provided and Patient Education Provided "     Education provided:   - none provided today    Written Home Exercises Provided: not provided today. Exercises were reviewed and Ruma was able to demonstrate them prior to the end of the session.  Ruma demonstrated good  understanding of the education provided. See EMR under Patient Instructions for exercises provided during therapy sessions    ASSESSMENT   pt noted some muscle fatigue in the arch of her R foot following activities today. Advised her in gastroc stretch for HEP and to use cold pack to her foot if it's still sore later today.   Able to add some new exercises to pt's regimen this date and she tolerated these well.     Ruma Is progressing towards her goals.   Pt prognosis is Good.     Pt will continue to benefit from skilled outpatient physical therapy to address the deficits listed in the problem list box on initial evaluation, provide pt/family education and to maximize pt's level of independence in the home and community environment.     Pt's spiritual, cultural and educational needs considered and pt agreeable to plan of care and goals.     Anticipated barriers to physical therapy: scheduling    Goals: please copy goals from initial eval    PLAN     Cont POC.     Kristie Elizabeth, PT

## 2022-01-27 ENCOUNTER — CLINICAL SUPPORT (OUTPATIENT)
Dept: REHABILITATION | Facility: HOSPITAL | Age: 42
End: 2022-01-27
Attending: PODIATRIST
Payer: COMMERCIAL

## 2022-01-27 DIAGNOSIS — R29.898 IMPAIRED STRENGTH OF LOWER EXTREMITY: ICD-10-CM

## 2022-01-27 DIAGNOSIS — M25.674 DECREASED RANGE OF MOTION OF RIGHT FOOT: ICD-10-CM

## 2022-01-27 DIAGNOSIS — M67.01 TIGHT HEELCORDS, ACQUIRED, RIGHT: ICD-10-CM

## 2022-01-27 DIAGNOSIS — R52 PAIN AGGRAVATED BY STANDING: ICD-10-CM

## 2022-01-27 DIAGNOSIS — M79.671 RIGHT FOOT PAIN: ICD-10-CM

## 2022-01-27 PROCEDURE — 97110 THERAPEUTIC EXERCISES: CPT | Mod: PO,CQ

## 2022-01-27 NOTE — PROGRESS NOTES
"[facility]   Physical Therapy Treatment Note     Name: Ruma Guillen  Clinic Number: [id number]    Therapy Diagnosis:   Encounter Diagnoses   Name Primary?   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    She was not provided with home exercise program.  Response to previous treatment: WNL  Functional change: ongoing    Pain: 0/10  Location: right foot        OBJECTIVE     Objective measures taken at progress report unless specified otherwise.    TREATMENT      BOLD INDICATES ACTIVITIES PERFORMED    Ruma received the treatments listed below:      THERAPEUTIC EXERCISES to develop  strength, ROM and flexibility for 30 minutes including     -soleus stretch on incline 3x30" R  -supine hamstring stretch 3x30"   -sidelying inversion/eversion x20 ea. Added 2# AW  -gastroc stretch on incline 3x30" B  -bridges x20  -marble pickups x2 reps   -towel crunches x2 min  -toe yoga x2 min  -seated heel raises x30 added 10lb KB ontop of knee  NP-ankle rocks x2 min  -clamshells x20 B  -sidelying hip abduction x20 B        MANUAL THERAPY TECHNIQUES  were applied for 0 minutes.        PATIENT EDUCATION AND HOME EXERCISES     Home Exercises Provided and Patient Education " Provided     Education provided:   - none provided today    Written Home Exercises Provided: not provided today. Exercises were reviewed and Ruma was able to demonstrate them prior to the end of the session.  Ruma demonstrated good  understanding of the education provided. See EMR under Patient Instructions for exercises provided during therapy sessions    ASSESSMENT     Pt with no reports of pain this morning prior to beginning PT treatment.  Pt performed the above exercises with good tolerance and no reports of increased symptoms requiring verbal and tactile cueing on proper eccentric control during SL inversion and eversion exercises to to facilitate appropriate muscle activation.     Ruma Is progressing towards her goals.   Pt prognosis is Good.     Pt will continue to benefit from skilled outpatient physical therapy to address the deficits listed in the problem list box on initial evaluation, provide pt/family education and to maximize pt's level of independence in the home and community environment.     Pt's spiritual, cultural and educational needs considered and pt agreeable to plan of care and goals.     Anticipated barriers to physical therapy: scheduling    Goals: please copy goals from initial eval    PLAN     Cont POC.     Rohan Rosales, PTA

## 2022-02-01 ENCOUNTER — CLINICAL SUPPORT (OUTPATIENT)
Dept: REHABILITATION | Facility: HOSPITAL | Age: 42
End: 2022-02-01
Payer: COMMERCIAL

## 2022-02-01 DIAGNOSIS — R52 PAIN AGGRAVATED BY STANDING: ICD-10-CM

## 2022-02-01 DIAGNOSIS — M25.674 DECREASED RANGE OF MOTION OF RIGHT FOOT: ICD-10-CM

## 2022-02-01 DIAGNOSIS — R29.898 IMPAIRED STRENGTH OF LOWER EXTREMITY: ICD-10-CM

## 2022-02-01 DIAGNOSIS — M67.01 TIGHT HEELCORDS, ACQUIRED, RIGHT: ICD-10-CM

## 2022-02-01 DIAGNOSIS — M79.671 RIGHT FOOT PAIN: ICD-10-CM

## 2022-02-01 PROCEDURE — 97110 THERAPEUTIC EXERCISES: CPT | Mod: PO

## 2022-02-01 NOTE — PROGRESS NOTES
"[facility]   Physical Therapy Treatment Note     Name: Ruma Guillen  Clinic Number: [id number]    Therapy Diagnosis:   Encounter Diagnoses   Name Primary?   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    She was not provided with home exercise program.  Response to previous treatment: WNL  Functional change: ongoing    Pain: 0/10  Location: right foot        OBJECTIVE     Objective measures taken at progress report unless specified otherwise.    TREATMENT      BOLD INDICATES ACTIVITIES PERFORMED    Ruma received the treatments listed below:      THERAPEUTIC EXERCISES to develop  strength, ROM and flexibility for 35 minutes including   -gastroc stretch on incline 3x30" B  -soleus stretch on incline 3x30" R  -sitting hamstring stretch 3x30"   -marble pickups x2 reps   -towel crunches x2 min  -toe yoga x2 min  +lacrosse ball to PF x2mins  -sidelying inversion/eversion x30 ea. Using 3# AW  -seated heel raises x30 added 10lb KB ontop of knee  +resisted side steps wearing BTB around ankles  +bridges x20, holding 10lb KB on hips  -sidelying hip abduction x30 B                PATIENT EDUCATION AND HOME EXERCISES     Home Exercises Provided and Patient " Education Provided     Education provided:   - none provided today    Written Home Exercises Provided: not provided today. Exercises were reviewed and Ruma was able to demonstrate them prior to the end of the session.  Ruma demonstrated good  understanding of the education provided. See EMR under Patient Instructions for exercises provided during therapy sessions    ASSESSMENT     Pt tolerated variations in her strengthening well this date. She continues to report no pain in her foot.     Ruma Is progressing towards her goals.   Pt prognosis is Good.     Pt will continue to benefit from skilled outpatient physical therapy to address the deficits listed in the problem list box on initial evaluation, provide pt/family education and to maximize pt's level of independence in the home and community environment.     Pt's spiritual, cultural and educational needs considered and pt agreeable to plan of care and goals.     Anticipated barriers to physical therapy: scheduling  Goals:     Ankle / Lower extremity  Short Term Goals: 5 weeks   1. Pts pain decreased 20 - 40% for improved functional mobility and quality of life ONGOING  2. Pts PROM in dorsiflexion increased by 5 degrees to enhance AROM and functional mobility  ONGOING  3. Pts strength in the right leg musculature increased by 1/2 grade to facilitate improved active mobility and functional stability ONGOING  4. Pt to exhibit correct return demonstration of exercises for self-management and independence with HEP ONGOING  5. Pt to demonstrate enhanced neuromuscular response  with single leg static balance for improved functional mobility and gait pattern  ONGOING     Long Term Goals : 10 weeks   1.  Pts pain level decreased to 75% or greater with first out of bed for restoring functional mobility and ADL. ONGOING  2. Pts AROM in dorsiflexion increased by 5 to 10  degrees to restore functional mobility and ADL  ONGOING  3. Pts strength in the right leg musculature  increased by one grade to facilitate improved active mobility and functional stability  ONGOING  4. Pt will be independent with HEP for self-management. ONGOING   5. Pt to exhibit dynamic stability on the RLE / LLE for stable functional mobility and gait. ONGOING      PLAN   Plan of care Certification: 1/11/2022 to 4/11/2022.     Outpatient Physical Therapy 2 times weekly for 10 weeks to include the following interventions: Manual Therapy, Neuromuscular Re-ed, Patient Education and Therapeutic Exercise.     Kristie Elizabeth, PT

## 2022-02-03 ENCOUNTER — CLINICAL SUPPORT (OUTPATIENT)
Dept: REHABILITATION | Facility: HOSPITAL | Age: 42
End: 2022-02-03
Payer: COMMERCIAL

## 2022-02-03 DIAGNOSIS — M25.674 DECREASED RANGE OF MOTION OF RIGHT FOOT: ICD-10-CM

## 2022-02-03 DIAGNOSIS — R29.898 IMPAIRED STRENGTH OF LOWER EXTREMITY: ICD-10-CM

## 2022-02-03 DIAGNOSIS — M67.01 TIGHT HEELCORDS, ACQUIRED, RIGHT: ICD-10-CM

## 2022-02-03 DIAGNOSIS — R52 PAIN AGGRAVATED BY STANDING: ICD-10-CM

## 2022-02-03 PROCEDURE — 97110 THERAPEUTIC EXERCISES: CPT | Mod: PO | Performed by: PHYSICAL THERAPIST

## 2022-02-03 NOTE — PROGRESS NOTES
"[facility]   Physical Therapy Treatment Note     Name: Ruma Guillen  Clinic Number: [id number]    Therapy Diagnosis:   Encounter Diagnoses   Name Primary?   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 There have not been any episodes of pain in the foot since the evaluation and things are going well   She was not provided with home exercise program.  Response to previous treatment: no residual effects after the last session.   Functional change: ongoing    Pain: 0/10  Location: right foot        OBJECTIVE     Objective measures taken at progress report unless specified otherwise.    TREATMENT      BOLD INDICATES ACTIVITIES PERFORMED    Ruma received the treatments listed below:      THERAPEUTIC EXERCISES to develop  strength, ROM and flexibility for 40 minutes including   -gastroc soleus stretch on incline 3x30" B  -dynamic gastroc soleus stretch hold 5" x 12 patterns   -sitting hamstring / plantar fascia stretch w/towel 15"x 6   -marble pickups x2 reps   -towel crunches x2 min  -toe yoga x2 min  +lacrosse ball to PF x2mins  -sidelying inversion/eversion x30 ea. Using 3# AW  -standing heel raises x30 bilateral   -standing heel raise unilateral x20  +resisted side steps wearing BTB around " ankles  -bridges x20, NB (to activate adductors)  -sidelying hip adduction x30 B  -sidelying reverse clams  x20  -standing hip IR x20       PATIENT EDUCATION AND HOME EXERCISES     Home Exercises Provided and Patient Education Provided     Education provided:   - none provided today    Written Home Exercises Provided: not provided today. Exercises were reviewed and Ruma was able to demonstrate them prior to the end of the session.  Ruma demonstrated good  understanding of the education provided. See EMR under Patient Instructions for exercises provided during therapy sessions    ASSESSMENT      Then patient has been asymptomatic. She does not demonstrate any challenge with the activities performed . Will consider DC in next few sessions. No significant mobility limitation. Added activities to address hip IR and muscle strengthening.    Ruma Is progressing towards her goals.   Pt prognosis is Good.     Pt will continue to benefit from skilled outpatient physical therapy to address the deficits listed in the problem list box on initial evaluation, provide pt/family education and to maximize pt's level of independence in the home and community environment.     Pt's spiritual, cultural and educational needs considered and pt agreeable to plan of care and goals.     Anticipated barriers to physical therapy: scheduling  Goals:     Ankle / Lower extremity  Short Term Goals: 5 weeks   1. Pts pain decreased 20 - 40% for improved functional mobility and quality of life ONGOING  2. Pts PROM in dorsiflexion increased by 5 degrees to enhance AROM and functional mobility  ONGOING  3. Pts strength in the right leg musculature increased by 1/2 grade to facilitate improved active mobility and functional stability ONGOING  4. Pt to exhibit correct return demonstration of exercises for self-management and independence with HEP ONGOING  5. Pt to demonstrate enhanced neuromuscular response  with single leg static balance for improved  functional mobility and gait pattern  ONGOING     Long Term Goals : 10 weeks   1.  Pts pain level decreased to 75% or greater with first out of bed for restoring functional mobility and ADL. ONGOING  2. Pts AROM in dorsiflexion increased by 5 to 10  degrees to restore functional mobility and ADL  ONGOING  3. Pts strength in the right leg musculature increased by one grade to facilitate improved active mobility and functional stability  ONGOING  4. Pt will be independent with HEP for self-management. ONGOING   5. Pt to exhibit dynamic stability on the RLE / LLE for stable functional mobility and gait. ONGOING      PLAN   Plan of care Certification: 1/11/2022 to 4/11/2022.     Outpatient Physical Therapy 2 times weekly for 10 weeks to include the following interventions: Manual Therapy, Neuromuscular Re-ed, Patient Education and Therapeutic Exercise.     Nahun Barcenas, PT

## 2022-02-07 ENCOUNTER — CLINICAL SUPPORT (OUTPATIENT)
Dept: REHABILITATION | Facility: HOSPITAL | Age: 42
End: 2022-02-07
Payer: COMMERCIAL

## 2022-02-07 DIAGNOSIS — R52 PAIN AGGRAVATED BY STANDING: ICD-10-CM

## 2022-02-07 DIAGNOSIS — M67.01 TIGHT HEELCORDS, ACQUIRED, RIGHT: ICD-10-CM

## 2022-02-07 DIAGNOSIS — R29.898 IMPAIRED STRENGTH OF LOWER EXTREMITY: ICD-10-CM

## 2022-02-07 DIAGNOSIS — M25.674 DECREASED RANGE OF MOTION OF RIGHT FOOT: ICD-10-CM

## 2022-02-07 DIAGNOSIS — M79.672 LEFT FOOT PAIN: ICD-10-CM

## 2022-02-07 PROCEDURE — 97110 THERAPEUTIC EXERCISES: CPT | Mod: PO,CQ

## 2022-02-07 NOTE — PROGRESS NOTES
"[facility]   Physical Therapy Treatment Note     Name: Ruma Guillen  Clinic Number: [id number]    Therapy Diagnosis:   Encounter Diagnoses   Name Primary?   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    She was not provided with home exercise program.  Response to previous treatment: increased soreness.    Functional change: ongoing    Pain: did not rate/10  Location: right foot        OBJECTIVE     Objective measures taken at progress report unless specified otherwise.    TREATMENT      BOLD INDICATES ACTIVITIES PERFORMED    Ruma received the treatments listed below:      THERAPEUTIC EXERCISES to develop  strength, ROM and flexibility for 30 minutes including   -gastroc soleus stretch on incline 3x30" B  -dynamic gastroc soleus stretch hold 5" x 12 patterns   -sitting hamstring / plantar fascia stretch w/towel 15"x 6   -marble pickups x2 reps   -towel crunches x2 min  -toe yoga x2 min  +lacrosse ball to PF x2mins  -sidelying inversion/eversion x30 ea. Using 3# AW  -standing heel raises x30 bilateral   -standing heel raise unilateral x20  +resisted side steps wearing BTB around ankles  -bridges x20, NB (to activate adductors)  -sidelying hip " adduction x30 B  -sidelying reverse clams x 20  -standing hip IR x20       PATIENT EDUCATION AND HOME EXERCISES     Home Exercises Provided and Patient Education Provided     Education provided:   - none provided today    Written Home Exercises Provided: not provided today. Exercises were reviewed and Ruma was able to demonstrate them prior to the end of the session.  Ruma demonstrated good  understanding of the education provided. See EMR under Patient Instructions for exercises provided during therapy sessions    ASSESSMENT     Pt with reports of increased L foot soreness after her last treatment session.  Pt performed the above exercises without reports of pain and required verbal cueing on proper hip adductor activation during S/L exercises as well as narrow FRANK bridges.       Ruma Is progressing towards her goals.   Pt prognosis is Good.     Pt will continue to benefit from skilled outpatient physical therapy to address the deficits listed in the problem list box on initial evaluation, provide pt/family education and to maximize pt's level of independence in the home and community environment.     Pt's spiritual, cultural and educational needs considered and pt agreeable to plan of care and goals.     Anticipated barriers to physical therapy: scheduling  Goals:     Ankle / Lower extremity  Short Term Goals: 5 weeks   1. Pts pain decreased 20 - 40% for improved functional mobility and quality of life ONGOING  2. Pts PROM in dorsiflexion increased by 5 degrees to enhance AROM and functional mobility  ONGOING  3. Pts strength in the right leg musculature increased by 1/2 grade to facilitate improved active mobility and functional stability ONGOING  4. Pt to exhibit correct return demonstration of exercises for self-management and independence with HEP ONGOING  5. Pt to demonstrate enhanced neuromuscular response  with single leg static balance for improved functional mobility and gait pattern  ONGOING     Long  Term Goals : 10 weeks   1.  Pts pain level decreased to 75% or greater with first out of bed for restoring functional mobility and ADL. ONGOING  2. Pts AROM in dorsiflexion increased by 5 to 10  degrees to restore functional mobility and ADL  ONGOING  3. Pts strength in the right leg musculature increased by one grade to facilitate improved active mobility and functional stability  ONGOING  4. Pt will be independent with HEP for self-management. ONGOING   5. Pt to exhibit dynamic stability on the RLE / LLE for stable functional mobility and gait. ONGOING      PLAN   Plan of care Certification: 1/11/2022 to 4/11/2022.     Outpatient Physical Therapy 2 times weekly for 10 weeks to include the following interventions: Manual Therapy, Neuromuscular Re-ed, Patient Education and Therapeutic Exercise.     Rohan Rosales, PTA

## 2022-02-10 ENCOUNTER — CLINICAL SUPPORT (OUTPATIENT)
Dept: REHABILITATION | Facility: HOSPITAL | Age: 42
End: 2022-02-10
Payer: COMMERCIAL

## 2022-02-10 DIAGNOSIS — M79.672 LEFT FOOT PAIN: ICD-10-CM

## 2022-02-10 DIAGNOSIS — M67.01 TIGHT HEELCORDS, ACQUIRED, RIGHT: ICD-10-CM

## 2022-02-10 DIAGNOSIS — R29.898 IMPAIRED STRENGTH OF LOWER EXTREMITY: ICD-10-CM

## 2022-02-10 DIAGNOSIS — M25.674 DECREASED RANGE OF MOTION OF RIGHT FOOT: ICD-10-CM

## 2022-02-10 DIAGNOSIS — R52 PAIN AGGRAVATED BY STANDING: ICD-10-CM

## 2022-02-10 PROCEDURE — 97110 THERAPEUTIC EXERCISES: CPT | Mod: PO,CQ

## 2022-02-10 NOTE — PROGRESS NOTES
"[facility]   Physical Therapy Treatment Note     Name: Ruma Guillen  Clinic Number: [id number]    Therapy Diagnosis:   Encounter Diagnoses   Name Primary?   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    She was not provided with home exercise program.  Response to previous treatment: increased soreness.    Functional change: ongoing    Pain: did not rate/10  Location: right foot        OBJECTIVE     Objective measures taken at progress report unless specified otherwise.    TREATMENT      BOLD INDICATES ACTIVITIES PERFORMED    Ruma received the treatments listed below:      THERAPEUTIC EXERCISES to develop  strength, ROM and flexibility for 30 minutes including     -gastroc soleus stretch on incline 3x30" B  -dynamic gastroc soleus stretch hold 5" x 12 patterns   -sitting hamstring / plantar fascia stretch w/towel 15"x 6    -marble pickups x2 reps   -towel crunches x2 min  -toe yoga x2 min  +lacrosse ball to PF x2mins  -sidelying inversion/eversion x30 ea. Using 3# AW  -standing heel raises x30 bilateral  -standing 2>1 heel raises x 20    -standing heel raise unilateral x10  +resisted side steps wearing BTB around ankles  -bridges x20, NB (to activate " adductors)  -sidelying hip adduction x30 B  -sidelying reverse clams x 20 B   -standing hip IR x20       PATIENT EDUCATION AND HOME EXERCISES     Home Exercises Provided and Patient Education Provided     Education provided:   - none provided today    Written Home Exercises Provided: not provided today. Exercises were reviewed and Ruma was able to demonstrate them prior to the end of the session.  Ruma demonstrated good  understanding of the education provided. See EMR under Patient Instructions for exercises provided during therapy sessions    ASSESSMENT     Pt with no reports of pain this morning prior to beginning PT treatment.  Pt performed the above exercises with the addition of 2>1 eccentric heel raises with good tolerance requiring some minor cueing on proper eccentric control to facilitate appropriate muscle activation.     Ruma Is progressing towards her goals.   Pt prognosis is Good.     Pt will continue to benefit from skilled outpatient physical therapy to address the deficits listed in the problem list box on initial evaluation, provide pt/family education and to maximize pt's level of independence in the home and community environment.     Pt's spiritual, cultural and educational needs considered and pt agreeable to plan of care and goals.     Anticipated barriers to physical therapy: scheduling  Goals:     Ankle / Lower extremity  Short Term Goals: 5 weeks   1. Pts pain decreased 20 - 40% for improved functional mobility and quality of life ONGOING  2. Pts PROM in dorsiflexion increased by 5 degrees to enhance AROM and functional mobility  ONGOING  3. Pts strength in the right leg musculature increased by 1/2 grade to facilitate improved active mobility and functional stability ONGOING  4. Pt to exhibit correct return demonstration of exercises for self-management and independence with HEP ONGOING  5. Pt to demonstrate enhanced neuromuscular response  with single leg static balance for improved  functional mobility and gait pattern  ONGOING     Long Term Goals : 10 weeks   1.  Pts pain level decreased to 75% or greater with first out of bed for restoring functional mobility and ADL. ONGOING  2. Pts AROM in dorsiflexion increased by 5 to 10  degrees to restore functional mobility and ADL  ONGOING  3. Pts strength in the right leg musculature increased by one grade to facilitate improved active mobility and functional stability  ONGOING  4. Pt will be independent with HEP for self-management. ONGOING   5. Pt to exhibit dynamic stability on the RLE / LLE for stable functional mobility and gait. ONGOING      PLAN   Plan of care Certification: 1/11/2022 to 4/11/2022.     Outpatient Physical Therapy 2 times weekly for 10 weeks to include the following interventions: Manual Therapy, Neuromuscular Re-ed, Patient Education and Therapeutic Exercise.     Rohan Rosales, PTA

## 2022-02-14 ENCOUNTER — CLINICAL SUPPORT (OUTPATIENT)
Dept: REHABILITATION | Facility: HOSPITAL | Age: 42
End: 2022-02-14
Payer: COMMERCIAL

## 2022-02-14 DIAGNOSIS — M79.672 LEFT FOOT PAIN: ICD-10-CM

## 2022-02-14 DIAGNOSIS — M25.674 DECREASED RANGE OF MOTION OF RIGHT FOOT: ICD-10-CM

## 2022-02-14 DIAGNOSIS — M67.01 TIGHT HEELCORDS, ACQUIRED, RIGHT: ICD-10-CM

## 2022-02-14 DIAGNOSIS — R52 PAIN AGGRAVATED BY STANDING: ICD-10-CM

## 2022-02-14 DIAGNOSIS — R29.898 IMPAIRED STRENGTH OF LOWER EXTREMITY: ICD-10-CM

## 2022-02-14 PROCEDURE — 97110 THERAPEUTIC EXERCISES: CPT | Mod: PO,CQ

## 2022-02-14 NOTE — PROGRESS NOTES
"[facility]   Physical Therapy Treatment Note     Name: Ruma Guillen  Clinic Number: [id number]    Therapy Diagnosis:   Encounter Diagnoses   Name Primary?   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   She was not provided with home exercise program.  Response to previous treatment: Felt fine.   Functional change: ongoing    Pain: did not rate/10  Location: right foot        OBJECTIVE     Objective measures taken at progress report unless specified otherwise.    TREATMENT      BOLD INDICATES ACTIVITIES PERFORMED    Ruma received the treatments listed below:      THERAPEUTIC EXERCISES to develop  strength, ROM and flexibility for 30 minutes including     -gastroc soleus stretch on incline 3x30" B   -dynamic gastroc soleus stretch hold 5" x 12 patterns   -sitting hamstring / plantar fascia stretch w/towel 15"x 6    -marble pickups x2 reps   -towel crunches x2 min  -toe yoga x2 min  +lacrosse ball to PF x2mins  -sidelying inversion/eversion x30 ea. Using 3# AW  -standing heel raises x30 bilateral  -standing 2>1 heel raises x 20    -standing heel raise unilateral x10  - " double leg shuttle press 3 blk bands 3 x 10  - single leg shuttle press 1 blk band 3 x 10   +resisted side steps wearing BTB around ankles  -bridges x20, NB (to activate adductors)  -sidelying hip adduction x30 B  -sidelying reverse clams x 20 B   -standing hip IR x20       PATIENT EDUCATION AND HOME EXERCISES     Home Exercises Provided and Patient Education Provided     Education provided:   - none provided today    Written Home Exercises Provided: not provided today. Exercises were reviewed and Ruma was able to demonstrate them prior to the end of the session.  Ruma demonstrated good  understanding of the education provided. See EMR under Patient Instructions for exercises provided during therapy sessions    ASSESSMENT     Shuttle presses were added this morning to continue to work on improving pt's R LE strength with weight bearing activity.  Pt required verbal and tactile cueing on proper knee alignment to prevent knee valgus collapse.     Ruma Is progressing towards her goals.   Pt prognosis is Good.     Pt will continue to benefit from skilled outpatient physical therapy to address the deficits listed in the problem list box on initial evaluation, provide pt/family education and to maximize pt's level of independence in the home and community environment.     Pt's spiritual, cultural and educational needs considered and pt agreeable to plan of care and goals.     Anticipated barriers to physical therapy: scheduling  Goals:     Ankle / Lower extremity  Short Term Goals: 5 weeks   1. Pts pain decreased 20 - 40% for improved functional mobility and quality of life ONGOING  2. Pts PROM in dorsiflexion increased by 5 degrees to enhance AROM and functional mobility  ONGOING  3. Pts strength in the right leg musculature increased by 1/2 grade to facilitate improved active mobility and functional stability ONGOING  4. Pt to exhibit correct return demonstration of exercises for self-management and independence with  HEP ONGOING  5. Pt to demonstrate enhanced neuromuscular response  with single leg static balance for improved functional mobility and gait pattern  ONGOING     Long Term Goals : 10 weeks   1.  Pts pain level decreased to 75% or greater with first out of bed for restoring functional mobility and ADL. ONGOING  2. Pts AROM in dorsiflexion increased by 5 to 10  degrees to restore functional mobility and ADL  ONGOING  3. Pts strength in the right leg musculature increased by one grade to facilitate improved active mobility and functional stability  ONGOING  4. Pt will be independent with HEP for self-management. ONGOING   5. Pt to exhibit dynamic stability on the RLE / LLE for stable functional mobility and gait. ONGOING      PLAN   Plan of care Certification: 1/11/2022 to 4/11/2022.     Outpatient Physical Therapy 2 times weekly for 10 weeks to include the following interventions: Manual Therapy, Neuromuscular Re-ed, Patient Education and Therapeutic Exercise.     Rohan Rosales, PTA

## 2022-02-17 ENCOUNTER — CLINICAL SUPPORT (OUTPATIENT)
Dept: REHABILITATION | Facility: HOSPITAL | Age: 42
End: 2022-02-17
Payer: COMMERCIAL

## 2022-02-17 DIAGNOSIS — M67.01 TIGHT HEELCORDS, ACQUIRED, RIGHT: ICD-10-CM

## 2022-02-17 DIAGNOSIS — R29.898 IMPAIRED STRENGTH OF LOWER EXTREMITY: ICD-10-CM

## 2022-02-17 DIAGNOSIS — R52 PAIN AGGRAVATED BY STANDING: ICD-10-CM

## 2022-02-17 DIAGNOSIS — M25.674 DECREASED RANGE OF MOTION OF RIGHT FOOT: ICD-10-CM

## 2022-02-17 PROCEDURE — 97110 THERAPEUTIC EXERCISES: CPT | Mod: PO | Performed by: PHYSICAL THERAPIST

## 2022-02-17 NOTE — PROGRESS NOTES
"[facility]   Physical Therapy Treatment Note     Name: Ruma Guillen  Clinic Number: [id number]    Therapy Diagnosis:   Encounter Diagnoses   Name Primary?   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 Not feeling like it was before.    She was not provided with home exercise program.  Response to previous treatment: did fine   Functional change:not limited     Pain: 0 /10  Location: right foot        OBJECTIVE     Objective measures taken at progress report unless specified otherwise.    TREATMENT      BOLD INDICATES ACTIVITIES PERFORMED    Ruma received the treatments listed below:      THERAPEUTIC EXERCISES to develop  strength, ROM and flexibility for 45 minutes including       - double leg shuttle press 3 blk bands 3 x 10  - single leg shuttle press 1 blk band 3 x 10     SUPINE / LONG SIT   -dynamic gastroc soleus stretch hold 5" x 12 patterns   -sitting hamstring / plantar fascia stretch w/towel 10"x 12   -bridges x20, NB (to activate adductors)    SITTING  -marble pickups x2 reps   -towel crunches x2 min  -toe yoga x2 min  +lacrosse ball to PF x2mins    STANDING  -gastroc soleus stretch on incline 3x30" B    -standing heel raises x30 bilateral  -standing 2>1 heel raises x 20    -standing heel " raise unilateral x15  -standing hip ER / IR x20  +resisted side steps wearing BTB around ankles    SIDELYING  -sidelying hip adduction x30 B  -sidelying inversion/eversion x30 ea. Using 3# AW  -clams   -sidelying reverse clams x 20 B          PATIENT EDUCATION AND HOME EXERCISES     Home Exercises Provided and Patient Education Provided     Education provided:   - none provided today    Written Home Exercises Provided: not provided today. Exercises were reviewed and Ruma was able to demonstrate them prior to the end of the session.  Ruma demonstrated good  understanding of the education provided. See EMR under Patient Instructions for exercises provided during therapy sessions    ASSESSMENT     The patient performed the routine without any limitation. She is progressing very well at this time. Potential for DC. Progress to dynamic activities as toe walking and bouncing.     Ruma Is progressing towards her goals.   Pt prognosis is Good.     Pt will continue to benefit from skilled outpatient physical therapy to address the deficits listed in the problem list box on initial evaluation, provide pt/family education and to maximize pt's level of independence in the home and community environment.     Pt's spiritual, cultural and educational needs considered and pt agreeable to plan of care and goals.     Anticipated barriers to physical therapy: scheduling  Goals:     Ankle / Lower extremity  Short Term Goals: 5 weeks   1. Pts pain decreased 20 - 40% for improved functional mobility and quality of life ONGOING  2. Pts PROM in dorsiflexion increased by 5 degrees to enhance AROM and functional mobility  ONGOING  3. Pts strength in the right leg musculature increased by 1/2 grade to facilitate improved active mobility and functional stability ONGOING  4. Pt to exhibit correct return demonstration of exercises for self-management and independence with HEP ONGOING  5. Pt to demonstrate enhanced neuromuscular response  with  single leg static balance for improved functional mobility and gait pattern  ONGOING     Long Term Goals : 10 weeks   1.  Pts pain level decreased to 75% or greater with first out of bed for restoring functional mobility and ADL. ONGOING  2. Pts AROM in dorsiflexion increased by 5 to 10  degrees to restore functional mobility and ADL  ONGOING  3. Pts strength in the right leg musculature increased by one grade to facilitate improved active mobility and functional stability  ONGOING  4. Pt will be independent with HEP for self-management. ONGOING   5. Pt to exhibit dynamic stability on the RLE / LLE for stable functional mobility and gait. ONGOING      PLAN   Plan of care Certification: 1/11/2022 to 4/11/2022.     Outpatient Physical Therapy 2 times weekly for 10 weeks to include the following interventions: Manual Therapy, Neuromuscular Re-ed, Patient Education and Therapeutic Exercise.     Nahun Barcenas, PT

## 2022-02-24 ENCOUNTER — CLINICAL SUPPORT (OUTPATIENT)
Dept: REHABILITATION | Facility: HOSPITAL | Age: 42
End: 2022-02-24
Payer: COMMERCIAL

## 2022-02-24 DIAGNOSIS — M67.01 TIGHT HEELCORDS, ACQUIRED, RIGHT: Primary | ICD-10-CM

## 2022-02-24 DIAGNOSIS — R52 PAIN AGGRAVATED BY STANDING: ICD-10-CM

## 2022-02-24 DIAGNOSIS — M25.674 DECREASED RANGE OF MOTION OF RIGHT FOOT: ICD-10-CM

## 2022-02-24 DIAGNOSIS — M79.672 LEFT FOOT PAIN: ICD-10-CM

## 2022-02-24 DIAGNOSIS — R29.898 IMPAIRED STRENGTH OF LOWER EXTREMITY: ICD-10-CM

## 2022-02-24 PROCEDURE — 97110 THERAPEUTIC EXERCISES: CPT | Mod: PO,CQ

## 2022-02-24 NOTE — PROGRESS NOTES
"[facility]   Physical Therapy Treatment Note     Name: Ruma Guillen  Clinic Number: [id number]    Therapy Diagnosis:   Encounter Diagnoses   Name Primary?   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 Not feeling like it was before.    She was not provided with home exercise program.  Response to previous treatment: did fine   Functional change:not limited     Pain: 0 /10  Location: right foot        OBJECTIVE     Objective measures taken at progress report unless specified otherwise.    TREATMENT      BOLD INDICATES ACTIVITIES PERFORMED    Ruma received the treatments listed below:      THERAPEUTIC EXERCISES to develop  strength, ROM and flexibility for 45 minutes including       - double leg shuttle press 3 blk bands 3 x 10  - single leg shuttle press 1 blk band 3 x 10 B    SUPINE / LONG SIT   -dynamic gastroc soleus stretch hold 5" x 12 patterns   -sitting hamstring / plantar fascia stretch w/towel 10"x 10 R  -bridges x20, NB (to activate adductors)    SITTING  -marble pickups x2 reps R  -towel crunches x2 min R   -toe yoga x2 min R   +lacrosse ball to PF x2mins    STANDING  -gastroc/ soleus stretch on incline 3x30" B    -standing heel raises x30 bilateral  -standing " 2>1 heel raises x 20    -standing heel raise unilateral x15 ea   -standing hip ER / IR x20  +resisted side steps wearing BTB around ankles    SIDELYING  -sidelying hip adduction x30 B  -sidelying inversion/eversion x30 ea. Using 3# AW  -sidelying clams 3 x 10 OTB B   -sidelying reverse clams x 20 B          PATIENT EDUCATION AND HOME EXERCISES     Home Exercises Provided and Patient Education Provided     Education provided:   - none provided today    Written Home Exercises Provided: not provided today. Exercises were reviewed and Ruma was able to demonstrate them prior to the end of the session.  Ruma demonstrated good  understanding of the education provided. See EMR under Patient Instructions for exercises provided during therapy sessions    ASSESSMENT     The patient with no pain post treatment session.  Pt continue's to perform the above exercises with good tolerance and is able to maintain appropriate exercise form.  Pt continue's to respond well to PT POC at this time.     Ruma Is progressing towards her goals.   Pt prognosis is Good.     Pt will continue to benefit from skilled outpatient physical therapy to address the deficits listed in the problem list box on initial evaluation, provide pt/family education and to maximize pt's level of independence in the home and community environment.     Pt's spiritual, cultural and educational needs considered and pt agreeable to plan of care and goals.     Anticipated barriers to physical therapy: scheduling  Goals:     Ankle / Lower extremity  Short Term Goals: 5 weeks   1. Pts pain decreased 20 - 40% for improved functional mobility and quality of life ONGOING  2. Pts PROM in dorsiflexion increased by 5 degrees to enhance AROM and functional mobility  ONGOING  3. Pts strength in the right leg musculature increased by 1/2 grade to facilitate improved active mobility and functional stability ONGOING  4. Pt to exhibit correct return demonstration of exercises for  self-management and independence with HEP ONGOING  5. Pt to demonstrate enhanced neuromuscular response  with single leg static balance for improved functional mobility and gait pattern  ONGOING     Long Term Goals : 10 weeks   1.  Pts pain level decreased to 75% or greater with first out of bed for restoring functional mobility and ADL. ONGOING  2. Pts AROM in dorsiflexion increased by 5 to 10  degrees to restore functional mobility and ADL  ONGOING  3. Pts strength in the right leg musculature increased by one grade to facilitate improved active mobility and functional stability  ONGOING  4. Pt will be independent with HEP for self-management. ONGOING   5. Pt to exhibit dynamic stability on the RLE / LLE for stable functional mobility and gait. ONGOING      PLAN   Plan of care Certification: 1/11/2022 to 4/11/2022.     Outpatient Physical Therapy 2 times weekly for 10 weeks to include the following interventions: Manual Therapy, Neuromuscular Re-ed, Patient Education and Therapeutic Exercise.     Rohan Rosales, PTA

## 2022-02-28 ENCOUNTER — CLINICAL SUPPORT (OUTPATIENT)
Dept: REHABILITATION | Facility: HOSPITAL | Age: 42
End: 2022-02-28
Payer: COMMERCIAL

## 2022-02-28 DIAGNOSIS — R52 PAIN AGGRAVATED BY STANDING: ICD-10-CM

## 2022-02-28 DIAGNOSIS — M79.673 PAIN OF FOOT, UNSPECIFIED LATERALITY: ICD-10-CM

## 2022-02-28 DIAGNOSIS — M25.674 DECREASED RANGE OF MOTION OF RIGHT FOOT: ICD-10-CM

## 2022-02-28 DIAGNOSIS — M67.01 TIGHT HEELCORDS, ACQUIRED, RIGHT: Primary | ICD-10-CM

## 2022-02-28 DIAGNOSIS — R29.898 IMPAIRED STRENGTH OF LOWER EXTREMITY: ICD-10-CM

## 2022-02-28 PROCEDURE — 97110 THERAPEUTIC EXERCISES: CPT | Mod: PO

## 2022-02-28 NOTE — PROGRESS NOTES
[facility]   Physical Therapy Treatment Note / Reassessment     Name: Ruma Guillen  Clinic Number: [id number]    Therapy Diagnosis:   No diagnosis found.  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   She was not provided with home exercise program.  Response to previous treatment: did fine   Functional change:not limited     Pain: 0 /10  Location: right foot        OBJECTIVE     Posture: bilateral hallux valgus primarily at the distal phalanx with bilateral foot pronation R>L and slight calcaneal eversion.   Palpation: unremarkable for pain   Sensation: intact to light touch       Range of Motion/Strength:    Ankle   Right   Pain/Dysfunction     AROM PROM MMT     Plantarflexion 50 NT 4-/5     Dorsiflexion  Knee ext    15    NT 4-/5     Inversion (foot)  Calcaneal  35 NT 4-/5     Eversion (foot)  Calcaneal  15 NT 5/5           Strength: Hip     Right   Iliopsoas 4/5   PGM 4-/5   Ext 4-/5      Joint Mobility: normal talocrual, tibiotalar, calcaneonavicular and calcaneocuboid mobility. Npo limitation in mid-tarsal mobility.         Gait: none.  Level of Assistance: independent  Patient displays hip ER of the right hip at swing phase and heel strike.   Balance: Maintains SLS 10 seconds with eyes open BLE  "     Limitation/Restriction for FOTO IE ankle / foot Survey - not performed              TREATMENT      BOLD INDICATES ACTIVITIES PERFORMED    Ruma received the treatments listed below:      THERAPEUTIC EXERCISES to develop  strength, ROM and flexibility for 32 minutes including       - double leg shuttle press 3 blk bands 3 x 10  - single leg shuttle press 1 blk band 3 x 10 B    SUPINE / LONG SIT   -dynamic gastroc soleus stretch hold 5" x 12 patterns   -sitting hamstring / plantar fascia stretch w/towel 10"x 10 R  -bridges x20, NB (to activate adductors)    SITTING  -marble pickups x2 reps R  -towel crunches x2 min R   -toe yoga x2 min R   +lacrosse ball to PF x2mins    STANDING  -gastroc/ soleus stretch on incline 3x30" B    -standing heel raises x30 bilateral  -standing 2>1 heel raises x 20    -standing heel raise unilateral x15 ea   -standing hip ER / IR x20  +resisted side steps wearing BTB around ankles    SIDELYING  -sidelying hip adduction x30 B  -sidelying inversion/eversion x30 ea. Using 3# AW  -sidelying clams 3 x 10 OTB B   -sidelying reverse clams x 20 B          PATIENT EDUCATION AND HOME EXERCISES     Home Exercises Provided and Patient Education Provided     Education provided:   - none provided today    Written Home Exercises Provided: not provided today. Exercises were reviewed and Ruma was able to demonstrate them prior to the end of the session.  Ruma demonstrated good  understanding of the education provided. See EMR under Patient Instructions for exercises provided during therapy sessions    ASSESSMENT     Pt is doing well today without reports of pain, but does mention that she has some tightness in medial longitudinal arch with prolonged standing. She performs all exercises today with good recall. Her ROM is slightly improved and reaching WFL. Continue to progress.     Ruma Is progressing towards her goals.   Pt prognosis is Good.     Pt will continue to benefit from skilled outpatient " physical therapy to address the deficits listed in the problem list box on initial evaluation, provide pt/family education and to maximize pt's level of independence in the home and community environment.     Pt's spiritual, cultural and educational needs considered and pt agreeable to plan of care and goals.     Anticipated barriers to physical therapy: scheduling  Goals:     Ankle / Lower extremity  Short Term Goals: 5 weeks   1. Pts pain decreased 20 - 40% for improved functional mobility and quality of life ONGOING  2. Pts PROM in dorsiflexion increased by 5 degrees to enhance AROM and functional mobility  ONGOING  3. Pts strength in the right leg musculature increased by 1/2 grade to facilitate improved active mobility and functional stability ONGOING  4. Pt to exhibit correct return demonstration of exercises for self-management and independence with HEP ONGOING  5. Pt to demonstrate enhanced neuromuscular response  with single leg static balance for improved functional mobility and gait pattern  ONGOING     Long Term Goals : 10 weeks   1.  Pts pain level decreased to 75% or greater with first out of bed for restoring functional mobility and ADL. ONGOING  2. Pts AROM in dorsiflexion increased by 5 to 10  degrees to restore functional mobility and ADL  ONGOING  3. Pts strength in the right leg musculature increased by one grade to facilitate improved active mobility and functional stability  ONGOING  4. Pt will be independent with HEP for self-management. ONGOING   5. Pt to exhibit dynamic stability on the RLE / LLE for stable functional mobility and gait. ONGOING      PLAN   Plan of care Certification: 1/11/2022 to 4/11/2022.     Outpatient Physical Therapy 2 times weekly for 10 weeks to include the following interventions: Manual Therapy, Neuromuscular Re-ed, Patient Education and Therapeutic Exercise.     Jodi Dela Cruz, PT

## 2022-03-03 ENCOUNTER — CLINICAL SUPPORT (OUTPATIENT)
Dept: REHABILITATION | Facility: HOSPITAL | Age: 42
End: 2022-03-03
Attending: PODIATRIST
Payer: COMMERCIAL

## 2022-03-03 DIAGNOSIS — M25.674 DECREASED RANGE OF MOTION OF RIGHT FOOT: ICD-10-CM

## 2022-03-03 DIAGNOSIS — R29.898 IMPAIRED STRENGTH OF LOWER EXTREMITY: ICD-10-CM

## 2022-03-03 DIAGNOSIS — R52 PAIN AGGRAVATED BY STANDING: ICD-10-CM

## 2022-03-03 DIAGNOSIS — M67.01 TIGHT HEELCORDS, ACQUIRED, RIGHT: Primary | ICD-10-CM

## 2022-03-03 DIAGNOSIS — M79.671 RIGHT FOOT PAIN: ICD-10-CM

## 2022-03-03 PROCEDURE — 97110 THERAPEUTIC EXERCISES: CPT | Mod: PO

## 2022-03-03 NOTE — PROGRESS NOTES
"[facility]   Physical Therapy Treatment Note / Reassessment     Name: Ruma Guillen  Clinic Number: [id number]    Therapy Diagnosis:   Encounter Diagnoses   Name Primary?   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  Response to previous treatment: did fine   Functional change:not limited     Pain: 0 /10  Location: right foot        OBJECTIVE     Objective measures taken at progress report unless specified otherwise.    TREATMENT      BOLD INDICATES ACTIVITIES PERFORMED    Ruma received the treatments listed below:      THERAPEUTIC EXERCISES to develop  strength, ROM and flexibility for 33 minutes including       - double leg shuttle press 3 blk bands 3 x 10  - single leg shuttle press 1 blk band 3 x 10 B    SUPINE / LONG SIT   -dynamic gastroc soleus stretch hold 5" x 12 patterns   -sitting hamstring / plantar fascia stretch w/towel 10"x 10 R  -bridges x20, NB (to activate adductors)    SITTING  -marble pickups x2 reps R  -towel crunches x2 min R   -toe yoga x2 min R   +ankle INV/EV x30 ea.   lacrosse ball to PF x2mins    STANDING  -gastroc/ soleus stretch on incline 3x30" B    -standing heel raises x30 bilateral  +B " "heel raise on 1/2 foam roll x30  -standing 2>1 heel raises x 20    -standing heel raise unilateral x15 ea   -standing hip ER / IR x20  +resisted side steps wearing GTB around feet  +gastroc/soleus stretch on incline board 3x30" B    SIDELYING  -sidelying hip adduction x30 B  -sidelying inversion/eversion x30 ea. Using 3# AW  -sidelying clams 2 x 10 GTB B   -sidelying reverse clams x 20 B          PATIENT EDUCATION AND HOME EXERCISES     Home Exercises Provided and Patient Education Provided     Education provided:   - none provided today    Written Home Exercises Provided: not provided today. Exercises were reviewed and Ruma was able to demonstrate them prior to the end of the session.  Ruma demonstrated good  understanding of the education provided. See EMR under Patient Instructions for exercises provided during therapy sessions    ASSESSMENT      Pt is doing well this morning without any foot pain. She is able to perform new activities with good form and without any increases in pain. Continue to progress as tolerated.     Ruma Is progressing towards her goals.   Pt prognosis is Good.     Pt will continue to benefit from skilled outpatient physical therapy to address the deficits listed in the problem list box on initial evaluation, provide pt/family education and to maximize pt's level of independence in the home and community environment.     Pt's spiritual, cultural and educational needs considered and pt agreeable to plan of care and goals.     Anticipated barriers to physical therapy: scheduling  Goals:     Ankle / Lower extremity  Short Term Goals: 5 weeks   1. Pts pain decreased 20 - 40% for improved functional mobility and quality of life ONGOING  2. Pts PROM in dorsiflexion increased by 5 degrees to enhance AROM and functional mobility  ONGOING  3. Pts strength in the right leg musculature increased by 1/2 grade to facilitate improved active mobility and functional stability ONGOING  4. Pt to exhibit " correct return demonstration of exercises for self-management and independence with HEP ONGOING  5. Pt to demonstrate enhanced neuromuscular response  with single leg static balance for improved functional mobility and gait pattern  ONGOING     Long Term Goals : 10 weeks   1.  Pts pain level decreased to 75% or greater with first out of bed for restoring functional mobility and ADL. ONGOING  2. Pts AROM in dorsiflexion increased by 5 to 10  degrees to restore functional mobility and ADL  ONGOING  3. Pts strength in the right leg musculature increased by one grade to facilitate improved active mobility and functional stability  ONGOING  4. Pt will be independent with HEP for self-management. ONGOING   5. Pt to exhibit dynamic stability on the RLE / LLE for stable functional mobility and gait. ONGOING      PLAN   Plan of care Certification: 1/11/2022 to 4/11/2022.     Cont POC.     Jodi Dela Cruz, PT

## 2022-03-07 ENCOUNTER — CLINICAL SUPPORT (OUTPATIENT)
Dept: REHABILITATION | Facility: HOSPITAL | Age: 42
End: 2022-03-07
Attending: PODIATRIST
Payer: COMMERCIAL

## 2022-03-07 DIAGNOSIS — M67.01 TIGHT HEELCORDS, ACQUIRED, RIGHT: Primary | ICD-10-CM

## 2022-03-07 DIAGNOSIS — R52 PAIN AGGRAVATED BY STANDING: ICD-10-CM

## 2022-03-07 DIAGNOSIS — M79.671 RIGHT FOOT PAIN: ICD-10-CM

## 2022-03-07 DIAGNOSIS — R29.898 IMPAIRED STRENGTH OF LOWER EXTREMITY: ICD-10-CM

## 2022-03-07 DIAGNOSIS — M25.674 DECREASED RANGE OF MOTION OF RIGHT FOOT: ICD-10-CM

## 2022-03-07 PROCEDURE — 97110 THERAPEUTIC EXERCISES: CPT | Mod: PO,CQ

## 2022-03-07 NOTE — PROGRESS NOTES
"[facility]   Physical Therapy Treatment Note / Reassessment     Name: Ruma Guillen  Clinic Number: [id number]    Therapy Diagnosis:   No diagnosis found.  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 States the bottom of her foot feels kind of tight but not this morning   She was not provided with home exercise program.  Response to previous treatment: did fine   Functional change:not limited     Pain: 0 /10  Location: right foot        OBJECTIVE     Objective measures taken at progress report unless specified otherwise.    TREATMENT      BOLD INDICATES ACTIVITIES PERFORMED    Ruma received the treatments listed below:      THERAPEUTIC EXERCISES to develop  strength, ROM and flexibility for 30 minutes including       - double leg shuttle press 3 blk bands 3 x 10  - single leg shuttle press 1 blk band 3 x 10 B    SUPINE / LONG SIT   -dynamic gastroc soleus stretch hold 5" x 12 patterns   -sitting hamstring / plantar fascia stretch w/towel 10"x 10 R  -bridges x20, NB (to activate adductors)    SITTING  -marble pickups x2 reps R  -towel crunches x2 min R   -toe yoga x2 min R   -ankle INV/EV x30 ea.   lacrosse ball to PF x2mins    STANDING  -gastroc/ soleus stretch on incline 3x30" B    -standing heel raises x30 bilateral  -B heel raise on 1/2 foam roll x30  -standing 2>1 heel raises x 20    -standing heel raise unilateral x15 ea   -standing hip ER / IR x " "20  resisted side steps wearing GTB around feet  gastroc/soleus stretch on incline board 3 x 30" B    SIDELYING  -sidelying hip adduction x30 B  -sidelying inversion/eversion x30 ea. Using 3# AW  -sidelying clams 2 x 10 GTB B   -sidelying reverse clams x 20 B          PATIENT EDUCATION AND HOME EXERCISES     Home Exercises Provided and Patient Education Provided     Education provided:   - none provided today    Written Home Exercises Provided: not provided today. Exercises were reviewed and Ruma was able to demonstrate them prior to the end of the session.  Ruma demonstrated good  understanding of the education provided. See EMR under Patient Instructions for exercises provided during therapy sessions    ASSESSMENT      Patient with good tolerance for therapy session, no reports of increased pain or adverse symptoms.  Continue to progress as tolerated.     Ruma Is progressing towards her goals.   Pt prognosis is Good.     Pt will continue to benefit from skilled outpatient physical therapy to address the deficits listed in the problem list box on initial evaluation, provide pt/family education and to maximize pt's level of independence in the home and community environment.     Pt's spiritual, cultural and educational needs considered and pt agreeable to plan of care and goals.     Anticipated barriers to physical therapy: scheduling  Goals:     Ankle / Lower extremity  Short Term Goals: 5 weeks   1. Pts pain decreased 20 - 40% for improved functional mobility and quality of life ONGOING  2. Pts PROM in dorsiflexion increased by 5 degrees to enhance AROM and functional mobility  ONGOING  3. Pts strength in the right leg musculature increased by 1/2 grade to facilitate improved active mobility and functional stability ONGOING  4. Pt to exhibit correct return demonstration of exercises for self-management and independence with HEP ONGOING  5. Pt to demonstrate enhanced neuromuscular response  with single leg " static balance for improved functional mobility and gait pattern  ONGOING     Long Term Goals : 10 weeks   1.  Pts pain level decreased to 75% or greater with first out of bed for restoring functional mobility and ADL. ONGOING  2. Pts AROM in dorsiflexion increased by 5 to 10  degrees to restore functional mobility and ADL  ONGOING  3. Pts strength in the right leg musculature increased by one grade to facilitate improved active mobility and functional stability  ONGOING  4. Pt will be independent with HEP for self-management. ONGOING   5. Pt to exhibit dynamic stability on the RLE / LLE for stable functional mobility and gait. ONGOING      PLAN   Plan of care Certification: 1/11/2022 to 4/11/2022.     Cont POC.     Tayler Lopez, PTA

## 2022-03-21 ENCOUNTER — CLINICAL SUPPORT (OUTPATIENT)
Dept: REHABILITATION | Facility: HOSPITAL | Age: 42
End: 2022-03-21
Attending: PODIATRIST
Payer: COMMERCIAL

## 2022-03-21 DIAGNOSIS — M25.674 DECREASED RANGE OF MOTION OF RIGHT FOOT: ICD-10-CM

## 2022-03-21 DIAGNOSIS — R52 PAIN AGGRAVATED BY STANDING: ICD-10-CM

## 2022-03-21 DIAGNOSIS — R29.898 IMPAIRED STRENGTH OF LOWER EXTREMITY: ICD-10-CM

## 2022-03-21 DIAGNOSIS — M67.01 TIGHT HEELCORDS, ACQUIRED, RIGHT: Primary | ICD-10-CM

## 2022-03-21 PROCEDURE — 97110 THERAPEUTIC EXERCISES: CPT | Mod: PO,CQ

## 2022-03-21 NOTE — PROGRESS NOTES
"[facility]   Physical Therapy Treatment Note / Reassessment     Name: Ruma Guillen  Clinic Number: [id number]    Therapy Diagnosis:   Encounter Diagnoses   Name Primary?   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   She was not provided with home exercise program.  Response to previous treatment: did fine   Functional change:not limited     Pain: 0 /10  Location: right foot        OBJECTIVE     Objective measures taken at progress report unless specified otherwise.    TREATMENT      BOLD INDICATES ACTIVITIES PERFORMED    Ruma received the treatments listed below:      THERAPEUTIC EXERCISES to develop  strength, ROM and flexibility for 30 minutes including     EFX 6'     - double leg shuttle press 3 blk bands 3 x 10  - single leg shuttle press 1 blk band 3 x 10 B    SUPINE / LONG SIT   -dynamic gastroc soleus stretch hold 5" x 12 patterns   -sitting hamstring / plantar fascia stretch w/towel 10"x 10 R  -bridges x20, NB (to activate adductors)    SITTING  -marble pickups x2 reps R  -towel crunches x2 min R   -toe yoga x2 min R   -ankle INV/EV x30 ea.   lacrosse ball to PF x2mins    STANDING  -gastroc/ soleus stretch on " "incline 3x30" B    -standing heel raises x30 bilateral  -B heel raise on 1/2 foam roll x30  -standing 2>1 heel raises x 20    -standing heel raise unilateral x15 ea   -standing hip ER / IR x 20  resisted side steps wearing GTB around feet  gastroc/soleus stretch on incline board 3 x 30" B    SIDELYING  -sidelying hip adduction x30 B  -sidelying inversion/eversion x30 ea. Using 3# AW  -sidelying clams 3 x 10 GTB B   -sidelying reverse clams x 30 B YTB           PATIENT EDUCATION AND HOME EXERCISES     Home Exercises Provided and Patient Education Provided     Education provided:   - none provided today    Written Home Exercises Provided: not provided today. Exercises were reviewed and Ruma was able to demonstrate them prior to the end of the session.  Ruma demonstrated good  understanding of the education provided. See EMR under Patient Instructions for exercises provided during therapy sessions    ASSESSMENT      Patient resisted band was added to reverse clamshells this morning to continue to work on improving pt's hip rotator strength.  Pt continue's to respond well to her current PT POC but still has some increases in soreness at time's when standing for prolonged periods of time.     Ruma Is progressing towards her goals.   Pt prognosis is Good.     Pt will continue to benefit from skilled outpatient physical therapy to address the deficits listed in the problem list box on initial evaluation, provide pt/family education and to maximize pt's level of independence in the home and community environment.     Pt's spiritual, cultural and educational needs considered and pt agreeable to plan of care and goals.     Anticipated barriers to physical therapy: scheduling  Goals:     Ankle / Lower extremity  Short Term Goals: 5 weeks   1. Pts pain decreased 20 - 40% for improved functional mobility and quality of life ONGOING  2. Pts PROM in dorsiflexion increased by 5 degrees to enhance AROM and functional mobility  " ONGOING  3. Pts strength in the right leg musculature increased by 1/2 grade to facilitate improved active mobility and functional stability ONGOING  4. Pt to exhibit correct return demonstration of exercises for self-management and independence with HEP ONGOING  5. Pt to demonstrate enhanced neuromuscular response  with single leg static balance for improved functional mobility and gait pattern  ONGOING     Long Term Goals : 10 weeks   1.  Pts pain level decreased to 75% or greater with first out of bed for restoring functional mobility and ADL. ONGOING  2. Pts AROM in dorsiflexion increased by 5 to 10  degrees to restore functional mobility and ADL  ONGOING  3. Pts strength in the right leg musculature increased by one grade to facilitate improved active mobility and functional stability  ONGOING  4. Pt will be independent with HEP for self-management. ONGOING   5. Pt to exhibit dynamic stability on the RLE / LLE for stable functional mobility and gait. ONGOING      PLAN   Plan of care Certification: 1/11/2022 to 4/11/2022.     Cont POC.     Rohan Rosales, PTA

## 2022-04-27 ENCOUNTER — PATIENT MESSAGE (OUTPATIENT)
Dept: PSYCHIATRY | Facility: CLINIC | Age: 42
End: 2022-04-27
Payer: COMMERCIAL

## 2022-04-27 DIAGNOSIS — F90.0 ADHD (ATTENTION DEFICIT HYPERACTIVITY DISORDER), INATTENTIVE TYPE: ICD-10-CM

## 2022-04-27 RX ORDER — DEXTROAMPHETAMINE SACCHARATE, AMPHETAMINE ASPARTATE MONOHYDRATE, DEXTROAMPHETAMINE SULFATE AND AMPHETAMINE SULFATE 7.5; 7.5; 7.5; 7.5 MG/1; MG/1; MG/1; MG/1
30 CAPSULE, EXTENDED RELEASE ORAL EVERY MORNING
Qty: 30 CAPSULE | Refills: 0 | Status: SHIPPED | OUTPATIENT
Start: 2022-04-27 | End: 2022-05-23 | Stop reason: SDUPTHER

## 2022-05-09 ENCOUNTER — PATIENT MESSAGE (OUTPATIENT)
Dept: PSYCHIATRY | Facility: CLINIC | Age: 42
End: 2022-05-09
Payer: COMMERCIAL

## 2022-05-23 ENCOUNTER — OFFICE VISIT (OUTPATIENT)
Dept: PSYCHIATRY | Facility: CLINIC | Age: 42
End: 2022-05-23
Payer: COMMERCIAL

## 2022-05-23 DIAGNOSIS — F41.1 GENERALIZED ANXIETY DISORDER: ICD-10-CM

## 2022-05-23 DIAGNOSIS — F41.9 ANXIETY: ICD-10-CM

## 2022-05-23 DIAGNOSIS — F90.0 ADHD (ATTENTION DEFICIT HYPERACTIVITY DISORDER), INATTENTIVE TYPE: Primary | ICD-10-CM

## 2022-05-23 PROCEDURE — 99213 PR OFFICE/OUTPT VISIT, EST, LEVL III, 20-29 MIN: ICD-10-PCS | Mod: 95,,, | Performed by: NURSE PRACTITIONER

## 2022-05-23 PROCEDURE — 99213 OFFICE O/P EST LOW 20 MIN: CPT | Mod: 95,,, | Performed by: NURSE PRACTITIONER

## 2022-05-23 RX ORDER — DEXTROAMPHETAMINE SACCHARATE, AMPHETAMINE ASPARTATE MONOHYDRATE, DEXTROAMPHETAMINE SULFATE AND AMPHETAMINE SULFATE 7.5; 7.5; 7.5; 7.5 MG/1; MG/1; MG/1; MG/1
30 CAPSULE, EXTENDED RELEASE ORAL EVERY MORNING
Qty: 30 CAPSULE | Refills: 0 | Status: SHIPPED | OUTPATIENT
Start: 2022-06-23 | End: 2022-09-02 | Stop reason: SDUPTHER

## 2022-05-23 RX ORDER — DEXTROAMPHETAMINE SACCHARATE, AMPHETAMINE ASPARTATE MONOHYDRATE, DEXTROAMPHETAMINE SULFATE AND AMPHETAMINE SULFATE 7.5; 7.5; 7.5; 7.5 MG/1; MG/1; MG/1; MG/1
30 CAPSULE, EXTENDED RELEASE ORAL EVERY MORNING
Qty: 30 CAPSULE | Refills: 0 | Status: SHIPPED | OUTPATIENT
Start: 2022-05-23 | End: 2022-09-02 | Stop reason: SDUPTHER

## 2022-05-23 RX ORDER — ALPRAZOLAM 0.25 MG/1
0.25 TABLET ORAL 2 TIMES DAILY PRN
Qty: 30 TABLET | Refills: 5 | Status: SHIPPED | OUTPATIENT
Start: 2022-05-23 | End: 2022-09-02 | Stop reason: SDUPTHER

## 2022-05-23 RX ORDER — CITALOPRAM 20 MG/1
20 TABLET, FILM COATED ORAL DAILY
Qty: 90 TABLET | Refills: 3 | Status: SHIPPED | OUTPATIENT
Start: 2022-05-23 | End: 2022-09-02 | Stop reason: SDUPTHER

## 2022-05-23 RX ORDER — DEXTROAMPHETAMINE SACCHARATE, AMPHETAMINE ASPARTATE MONOHYDRATE, DEXTROAMPHETAMINE SULFATE AND AMPHETAMINE SULFATE 7.5; 7.5; 7.5; 7.5 MG/1; MG/1; MG/1; MG/1
30 CAPSULE, EXTENDED RELEASE ORAL EVERY MORNING
Qty: 30 CAPSULE | Refills: 0 | Status: SHIPPED | OUTPATIENT
Start: 2022-07-23 | End: 2022-09-02 | Stop reason: SDUPTHER

## 2022-05-23 NOTE — PROGRESS NOTES
Outpatient Psychiatry Follow-Up Visit (MD/NP)    5/23/2022    Clinical Status of Patient:  Outpatient (Ambulatory)    Chief Complaint:  Ruma Guillen is a 41 y.o. female who presents today for follow-up of anxiety and attention problems.  Met with patient.      Last visit was: 1/03/22. Chart and  reviewed  The patient location is: home  The chief complaint leading to consultation is: ADHD, anxiety    Visit type: audiovisual    Face to Face time with patient: 28 minutes  30 minutes of total time spent on the encounter, which includes face to face time and non-face to face time preparing to see the patient (eg, review of tests), Obtaining and/or reviewing separately obtained history, Documenting clinical information in the electronic or other health record, Independently interpreting results (not separately reported) and communicating results to the patient/family/caregiver, or Care coordination (not separately reported).     Each patient to whom he or she provides medical services by telemedicine is:  (1) informed of the relationship between the physician and patient and the respective role of any other health care provider with respect to management of the patient; and (2) notified that he or she may decline to receive medical services by telemedicine and may withdraw from such care at any time.    Interval History and Content of Current Session:  Current Psychiatric Medications/changes  · Continue Adderall XR 30 mg po daily (3-months Rx printed)  · Continue Celexa 20 mg po daily  · Continue Xanax 0.25 mg po BID PRN    Virtual Visit:  Reports no changes since last visit. Pt presents bright affect and euthymic mood. Pt reports effective results from medications and denies side effects.   Denies unmanaged symptoms of depression, anxiety, or ADHD. Denies SI/HI/AVH.  Will continue medications.      Psychotherapy:  · Target symptoms: distractability, anxiety , work stress  · Why chosen therapy is appropriate versus  another modality: relevant to diagnosis  · Outcome monitoring methods: self-report  · Therapeutic intervention type: behavior modifying psychotherapy  · Topics discussed/themes: building skills sets for symptom management, symptom recognition  · The patient's response to the intervention is accepting. The patient's progress toward treatment goals is good.   · Duration of intervention: 11 minutes.    Review of Systems   · PSYCHIATRIC: Pertinant items are noted in the narrative.  · CONSTITUTIONAL: No weight gain or loss.   · MUSCULOSKELETAL: No pain or stiffness of the joints.  · NEUROLOGIC: No weakness, sensory changes, seizures, confusion, memory loss, tremor or other abnormal movements.  · ENDOCRINE: No polydipsia or polyuria.  · INTEGUMENTARY: No rashes or lacerations.  · EYES: No exophthalmos, jaundice or blindness.  · ENT: No dizziness, tinnitus or hearing loss.  · RESPIRATORY: No shortness of breath.  · CARDIOVASCULAR: No tachycardia or chest pain.  · GASTROINTESTINAL: No nausea, vomiting, pain, constipation or diarrhea.  · GENITOURINARY: No frequency, dysuria or sexual dysfunction.  · HEMATOLOGIC/LYMPHATIC: No excessive bleeding, prolonged or excessive bleeding after dental extraction/injury.  · ALLERGIC/IMMUNOLOGIC: No allergic response to materials, foods or animals at this time.    Past Medical, Family and Social History: The patient's past medical, family and social history have been reviewed and updated as appropriate within the electronic medical record - see encounter notes.    Compliance: yes    Side effects: None    Risk Parameters:  Patient reports no suicidal ideation  Patient reports no homicidal ideation  Patient reports no self-injurious behavior  Patient reports no violent behavior    Exam (detailed: at least 9 elements; comprehensive: all 15 elements)   Constitutional  Vitals:  Most recent vital signs, dated greater than 90 days prior to this appointment, were reviewed.   There were no vitals  filed for this visit.     General:  unremarkable, age appropriate     Musculoskeletal  Muscle Strength/Tone:  no tremor, no tic   Gait & Station:  non-ataxic     Psychiatric  Speech:  no latency; no press   Mood & Affect:  euthymic  congruent and appropriate   Thought Process:  normal and logical   Associations:  intact   Thought Content:  normal, no suicidality, no homicidality, delusions, or paranoia   Insight:  intact   Judgement: behavior is adequate to circumstances   Orientation:  grossly intact   Memory: intact for content of interview   Language: grossly intact   Attention Span & Concentration:  able to focus   Fund of Knowledge:  intact and appropriate to age and level of education     Assessment and Diagnosis   Status/Progress: Based on the examination today, the patient's problem(s) is/are improved and adequately but not ideally controlled.  New problems have not been presented today.   Co-morbidities and Lack of compliance are not complicating management of the primary condition.  There are no active rule-out diagnoses for this patient at this time.     General Impression:       ICD-10-CM ICD-9-CM   1. ADHD (attention deficit hyperactivity disorder), inattentive type  F90.0 314.00   2. Anxiety  F41.9 300.00   3. Generalized anxiety disorder  F41.1 300.02       Intervention/Counseling/Treatment Plan   · Medication Management: The risks and benefits of medication were discussed with the patient.  · Continue Adderall XR 30 mg po daily (3-months Rx printed)  · Continue Celexa 20 mg po daily  · Continue Xanax 0.25 mg po BID PRN    Return to Clinic: 3 months    Risks, benefits, side effects and alternative treatments discussed with patient. Patient agrees with the current plan as documented.  Encouraged Patient to keep future appointments.  Take medications as prescribed and abstain from substance abuse.  Pt to present to ED for thoughts to harm herself or others

## 2022-09-02 ENCOUNTER — OFFICE VISIT (OUTPATIENT)
Dept: PSYCHIATRY | Facility: CLINIC | Age: 42
End: 2022-09-02
Payer: COMMERCIAL

## 2022-09-02 DIAGNOSIS — F41.1 GENERALIZED ANXIETY DISORDER: ICD-10-CM

## 2022-09-02 DIAGNOSIS — F41.9 ANXIETY: ICD-10-CM

## 2022-09-02 DIAGNOSIS — F90.0 ADHD (ATTENTION DEFICIT HYPERACTIVITY DISORDER), INATTENTIVE TYPE: Primary | ICD-10-CM

## 2022-09-02 PROCEDURE — 1159F PR MEDICATION LIST DOCUMENTED IN MEDICAL RECORD: ICD-10-PCS | Mod: CPTII,95,, | Performed by: NURSE PRACTITIONER

## 2022-09-02 PROCEDURE — 1159F MED LIST DOCD IN RCRD: CPT | Mod: CPTII,95,, | Performed by: NURSE PRACTITIONER

## 2022-09-02 PROCEDURE — 1160F PR REVIEW ALL MEDS BY PRESCRIBER/CLIN PHARMACIST DOCUMENTED: ICD-10-PCS | Mod: CPTII,95,, | Performed by: NURSE PRACTITIONER

## 2022-09-02 PROCEDURE — 1160F RVW MEDS BY RX/DR IN RCRD: CPT | Mod: CPTII,95,, | Performed by: NURSE PRACTITIONER

## 2022-09-02 PROCEDURE — 99213 PR OFFICE/OUTPT VISIT, EST, LEVL III, 20-29 MIN: ICD-10-PCS | Mod: 95,,, | Performed by: NURSE PRACTITIONER

## 2022-09-02 PROCEDURE — 99213 OFFICE O/P EST LOW 20 MIN: CPT | Mod: 95,,, | Performed by: NURSE PRACTITIONER

## 2022-09-02 RX ORDER — DEXTROAMPHETAMINE SACCHARATE, AMPHETAMINE ASPARTATE MONOHYDRATE, DEXTROAMPHETAMINE SULFATE AND AMPHETAMINE SULFATE 7.5; 7.5; 7.5; 7.5 MG/1; MG/1; MG/1; MG/1
30 CAPSULE, EXTENDED RELEASE ORAL EVERY MORNING
Qty: 30 CAPSULE | Refills: 0 | Status: SHIPPED | OUTPATIENT
Start: 2022-10-02 | End: 2022-12-02 | Stop reason: SDUPTHER

## 2022-09-02 RX ORDER — DEXTROAMPHETAMINE SACCHARATE, AMPHETAMINE ASPARTATE MONOHYDRATE, DEXTROAMPHETAMINE SULFATE AND AMPHETAMINE SULFATE 7.5; 7.5; 7.5; 7.5 MG/1; MG/1; MG/1; MG/1
30 CAPSULE, EXTENDED RELEASE ORAL EVERY MORNING
Qty: 30 CAPSULE | Refills: 0 | Status: SHIPPED | OUTPATIENT
Start: 2022-11-02 | End: 2022-12-02 | Stop reason: SDUPTHER

## 2022-09-02 RX ORDER — ALPRAZOLAM 0.25 MG/1
0.25 TABLET ORAL 2 TIMES DAILY PRN
Qty: 30 TABLET | Refills: 5 | Status: SHIPPED | OUTPATIENT
Start: 2022-09-02 | End: 2022-12-02 | Stop reason: SDUPTHER

## 2022-09-02 RX ORDER — DEXTROAMPHETAMINE SACCHARATE, AMPHETAMINE ASPARTATE MONOHYDRATE, DEXTROAMPHETAMINE SULFATE AND AMPHETAMINE SULFATE 7.5; 7.5; 7.5; 7.5 MG/1; MG/1; MG/1; MG/1
30 CAPSULE, EXTENDED RELEASE ORAL EVERY MORNING
Qty: 30 CAPSULE | Refills: 0 | Status: SHIPPED | OUTPATIENT
Start: 2022-09-02 | End: 2022-12-02 | Stop reason: SDUPTHER

## 2022-09-02 RX ORDER — CITALOPRAM 20 MG/1
20 TABLET, FILM COATED ORAL DAILY
Qty: 90 TABLET | Refills: 3 | Status: SHIPPED | OUTPATIENT
Start: 2022-09-02 | End: 2022-12-02 | Stop reason: SDUPTHER

## 2022-09-02 NOTE — PROGRESS NOTES
Outpatient Psychiatry Follow-Up Visit (MD/NP)    9/2/2022    Clinical Status of Patient:  Outpatient (Ambulatory)    Chief Complaint:  Ruma Guillen is a 42 y.o. female who presents today for follow-up of anxiety and attention problems.  Met with patient.      Last visit was: 5/23/22. Chart and  reviewed  The patient location is: home  The chief complaint leading to consultation is: ADHD, anxiety    Visit type: audiovisual    Face to Face time with patient: 30 minutes  30 minutes of total time spent on the encounter, which includes face to face time and non-face to face time preparing to see the patient (eg, review of tests), Obtaining and/or reviewing separately obtained history, Documenting clinical information in the electronic or other health record, Independently interpreting results (not separately reported) and communicating results to the patient/family/caregiver, or Care coordination (not separately reported).     Each patient to whom he or she provides medical services by telemedicine is:  (1) informed of the relationship between the physician and patient and the respective role of any other health care provider with respect to management of the patient; and (2) notified that he or she may decline to receive medical services by telemedicine and may withdraw from such care at any time.    Interval History and Content of Current Session:  Current Psychiatric Medications/changes  Continue Adderall XR 30 mg po daily (3-months Rx printed)  Continue Celexa 20 mg po daily  Continue Xanax 0.25 mg po BID PRN    Virtual Visit:  Reports no changes since last visit. Pt presents bright affect and euthymic mood. Pt reports effective results from medications and denies side effects.   Denies unmanaged symptoms of depression, anxiety, or ADHD. Denies SI/HI/AVH.  Will continue medications.      Psychotherapy:  Target symptoms: distractability, anxiety , work stress  Why chosen therapy is appropriate versus another  modality: relevant to diagnosis  Outcome monitoring methods: self-report  Therapeutic intervention type: behavior modifying psychotherapy  Topics discussed/themes: building skills sets for symptom management, symptom recognition  The patient's response to the intervention is accepting. The patient's progress toward treatment goals is good.   Duration of intervention: 11 minutes.    Review of Systems   PSYCHIATRIC: Pertinant items are noted in the narrative.  CONSTITUTIONAL: No weight gain or loss.   MUSCULOSKELETAL: No pain or stiffness of the joints.  NEUROLOGIC: No weakness, sensory changes, seizures, confusion, memory loss, tremor or other abnormal movements.  ENDOCRINE: No polydipsia or polyuria.  INTEGUMENTARY: No rashes or lacerations.  EYES: No exophthalmos, jaundice or blindness.  ENT: No dizziness, tinnitus or hearing loss.  RESPIRATORY: No shortness of breath.  CARDIOVASCULAR: No tachycardia or chest pain.  GASTROINTESTINAL: No nausea, vomiting, pain, constipation or diarrhea.  GENITOURINARY: No frequency, dysuria or sexual dysfunction.  HEMATOLOGIC/LYMPHATIC: No excessive bleeding, prolonged or excessive bleeding after dental extraction/injury.  ALLERGIC/IMMUNOLOGIC: No allergic response to materials, foods or animals at this time.    Past Medical, Family and Social History: The patient's past medical, family and social history have been reviewed and updated as appropriate within the electronic medical record - see encounter notes.    Compliance: yes    Side effects: None    Risk Parameters:  Patient reports no suicidal ideation  Patient reports no homicidal ideation  Patient reports no self-injurious behavior  Patient reports no violent behavior    Exam (detailed: at least 9 elements; comprehensive: all 15 elements)   Constitutional  Vitals:  Most recent vital signs, dated greater than 90 days prior to this appointment, were reviewed.   There were no vitals filed for this visit.     General:   unremarkable, age appropriate     Musculoskeletal  Muscle Strength/Tone:  no tremor, no tic   Gait & Station:  non-ataxic     Psychiatric  Speech:  no latency; no press   Mood & Affect:  euthymic  congruent and appropriate   Thought Process:  normal and logical   Associations:  intact   Thought Content:  normal, no suicidality, no homicidality, delusions, or paranoia   Insight:  intact   Judgement: behavior is adequate to circumstances   Orientation:  grossly intact   Memory: intact for content of interview   Language: grossly intact   Attention Span & Concentration:  able to focus   Fund of Knowledge:  intact and appropriate to age and level of education     Assessment and Diagnosis   Status/Progress: Based on the examination today, the patient's problem(s) is/are improved and adequately but not ideally controlled.  New problems have not been presented today.   Co-morbidities and Lack of compliance are not complicating management of the primary condition.  There are no active rule-out diagnoses for this patient at this time.     General Impression:       ICD-10-CM ICD-9-CM   1. ADHD (attention deficit hyperactivity disorder), inattentive type  F90.0 314.00   2. Anxiety  F41.9 300.00   3. Generalized anxiety disorder  F41.1 300.02     Intervention/Counseling/Treatment Plan   Medication Management: The risks and benefits of medication were discussed with the patient.  Continue Adderall XR 30 mg po daily (3-months Rx printed)  Continue Celexa 20 mg po daily  Continue Xanax 0.25 mg po BID PRN    Return to Clinic: 3 months    Risks, benefits, side effects and alternative treatments discussed with patient. Patient agrees with the current plan as documented.  Encouraged Patient to keep future appointments.  Take medications as prescribed and abstain from substance abuse.  Pt to present to ED for thoughts to harm herself or others

## 2022-12-02 ENCOUNTER — OFFICE VISIT (OUTPATIENT)
Dept: PSYCHIATRY | Facility: CLINIC | Age: 42
End: 2022-12-02
Payer: COMMERCIAL

## 2022-12-02 DIAGNOSIS — F90.0 ADHD (ATTENTION DEFICIT HYPERACTIVITY DISORDER), INATTENTIVE TYPE: Primary | ICD-10-CM

## 2022-12-02 DIAGNOSIS — F41.9 ANXIETY: ICD-10-CM

## 2022-12-02 DIAGNOSIS — F41.1 GENERALIZED ANXIETY DISORDER: ICD-10-CM

## 2022-12-02 PROCEDURE — 99213 OFFICE O/P EST LOW 20 MIN: CPT | Performed by: NURSE PRACTITIONER

## 2022-12-02 PROCEDURE — 1159F MED LIST DOCD IN RCRD: CPT | Mod: CPTII,95,, | Performed by: NURSE PRACTITIONER

## 2022-12-02 PROCEDURE — 1160F RVW MEDS BY RX/DR IN RCRD: CPT | Mod: CPTII,95,, | Performed by: NURSE PRACTITIONER

## 2022-12-02 PROCEDURE — 99213 OFFICE O/P EST LOW 20 MIN: CPT | Mod: 95,,, | Performed by: NURSE PRACTITIONER

## 2022-12-02 PROCEDURE — 1159F PR MEDICATION LIST DOCUMENTED IN MEDICAL RECORD: ICD-10-PCS | Mod: CPTII,95,, | Performed by: NURSE PRACTITIONER

## 2022-12-02 PROCEDURE — 99213 PR OFFICE/OUTPT VISIT, EST, LEVL III, 20-29 MIN: ICD-10-PCS | Mod: 95,,, | Performed by: NURSE PRACTITIONER

## 2022-12-02 PROCEDURE — 1160F PR REVIEW ALL MEDS BY PRESCRIBER/CLIN PHARMACIST DOCUMENTED: ICD-10-PCS | Mod: CPTII,95,, | Performed by: NURSE PRACTITIONER

## 2022-12-02 RX ORDER — CITALOPRAM 20 MG/1
20 TABLET, FILM COATED ORAL DAILY
Qty: 90 TABLET | Refills: 3 | Status: SHIPPED | OUTPATIENT
Start: 2022-12-02 | End: 2023-03-15 | Stop reason: SDUPTHER

## 2022-12-02 RX ORDER — DEXTROAMPHETAMINE SACCHARATE, AMPHETAMINE ASPARTATE MONOHYDRATE, DEXTROAMPHETAMINE SULFATE AND AMPHETAMINE SULFATE 7.5; 7.5; 7.5; 7.5 MG/1; MG/1; MG/1; MG/1
30 CAPSULE, EXTENDED RELEASE ORAL EVERY MORNING
Qty: 30 CAPSULE | Refills: 0 | Status: SHIPPED | OUTPATIENT
Start: 2023-01-01 | End: 2023-03-15 | Stop reason: SDUPTHER

## 2022-12-02 RX ORDER — ALPRAZOLAM 0.25 MG/1
0.25 TABLET ORAL 2 TIMES DAILY PRN
Qty: 30 TABLET | Refills: 5 | Status: SHIPPED | OUTPATIENT
Start: 2022-12-02 | End: 2023-03-15 | Stop reason: SDUPTHER

## 2022-12-02 RX ORDER — DEXTROAMPHETAMINE SACCHARATE, AMPHETAMINE ASPARTATE MONOHYDRATE, DEXTROAMPHETAMINE SULFATE AND AMPHETAMINE SULFATE 7.5; 7.5; 7.5; 7.5 MG/1; MG/1; MG/1; MG/1
30 CAPSULE, EXTENDED RELEASE ORAL EVERY MORNING
Qty: 30 CAPSULE | Refills: 0 | Status: SHIPPED | OUTPATIENT
Start: 2022-12-02 | End: 2023-03-15 | Stop reason: SDUPTHER

## 2022-12-02 RX ORDER — DEXTROAMPHETAMINE SACCHARATE, AMPHETAMINE ASPARTATE MONOHYDRATE, DEXTROAMPHETAMINE SULFATE AND AMPHETAMINE SULFATE 7.5; 7.5; 7.5; 7.5 MG/1; MG/1; MG/1; MG/1
30 CAPSULE, EXTENDED RELEASE ORAL EVERY MORNING
Qty: 30 CAPSULE | Refills: 0 | Status: SHIPPED | OUTPATIENT
Start: 2023-02-01 | End: 2023-03-15 | Stop reason: SDUPTHER

## 2022-12-02 NOTE — PROGRESS NOTES
Outpatient Psychiatry Follow-Up Visit (MD/NP)    12/2/2022    Clinical Status of Patient:  Outpatient (Ambulatory)    Chief Complaint:  Ruma Guillen is a 42 y.o. female who presents today for follow-up of anxiety and attention problems.  Met with patient.      Last visit was: 9/02/22. Chart and  reviewed  The patient location is: home  The chief complaint leading to consultation is: ADHD, anxiety    Visit type: audiovisual    Face to Face time with patient: 30 minutes  30 minutes of total time spent on the encounter, which includes face to face time and non-face to face time preparing to see the patient (eg, review of tests), Obtaining and/or reviewing separately obtained history, Documenting clinical information in the electronic or other health record, Independently interpreting results (not separately reported) and communicating results to the patient/family/caregiver, or Care coordination (not separately reported).     Each patient to whom he or she provides medical services by telemedicine is:  (1) informed of the relationship between the physician and patient and the respective role of any other health care provider with respect to management of the patient; and (2) notified that he or she may decline to receive medical services by telemedicine and may withdraw from such care at any time.    Interval History and Content of Current Session:  Current Psychiatric Medications/changes  Continue Adderall XR 30 mg po daily (3-months Rx printed)  Continue Celexa 20 mg po daily  Continue Xanax 0.25 mg po BID PRN    Virtual Visit:  Reports no changes since last visit. Pt presents bright affect and euthymic mood. Pt reports effective results from medications and denies side effects.   Denies unmanaged symptoms of depression, anxiety, or ADHD. Denies SI/HI/AVH.  Will continue medications.      Psychotherapy:  Target symptoms: distractability, anxiety , work stress  Why chosen therapy is appropriate versus another  modality: relevant to diagnosis  Outcome monitoring methods: self-report  Therapeutic intervention type: behavior modifying psychotherapy  Topics discussed/themes: building skills sets for symptom management, symptom recognition  The patient's response to the intervention is accepting. The patient's progress toward treatment goals is good.   Duration of intervention: 11 minutes.    Review of Systems   PSYCHIATRIC: Pertinant items are noted in the narrative.  CONSTITUTIONAL: No weight gain or loss.   MUSCULOSKELETAL: No pain or stiffness of the joints.  NEUROLOGIC: No weakness, sensory changes, seizures, confusion, memory loss, tremor or other abnormal movements.  ENDOCRINE: No polydipsia or polyuria.  INTEGUMENTARY: No rashes or lacerations.  EYES: No exophthalmos, jaundice or blindness.  ENT: No dizziness, tinnitus or hearing loss.  RESPIRATORY: No shortness of breath.  CARDIOVASCULAR: No tachycardia or chest pain.  GASTROINTESTINAL: No nausea, vomiting, pain, constipation or diarrhea.  GENITOURINARY: No frequency, dysuria or sexual dysfunction.  HEMATOLOGIC/LYMPHATIC: No excessive bleeding, prolonged or excessive bleeding after dental extraction/injury.  ALLERGIC/IMMUNOLOGIC: No allergic response to materials, foods or animals at this time.    Past Medical, Family and Social History: The patient's past medical, family and social history have been reviewed and updated as appropriate within the electronic medical record - see encounter notes.    Compliance: yes    Side effects: None    Risk Parameters:  Patient reports no suicidal ideation  Patient reports no homicidal ideation  Patient reports no self-injurious behavior  Patient reports no violent behavior    Exam (detailed: at least 9 elements; comprehensive: all 15 elements)   Constitutional  Vitals:  Most recent vital signs, dated greater than 90 days prior to this appointment, were reviewed.   There were no vitals filed for this visit.     General:   unremarkable, age appropriate     Musculoskeletal  Muscle Strength/Tone:  no tremor, no tic   Gait & Station:  non-ataxic     Psychiatric  Speech:  no latency; no press   Mood & Affect:  euthymic  congruent and appropriate   Thought Process:  normal and logical   Associations:  intact   Thought Content:  normal, no suicidality, no homicidality, delusions, or paranoia   Insight:  intact   Judgement: behavior is adequate to circumstances   Orientation:  grossly intact   Memory: intact for content of interview   Language: grossly intact   Attention Span & Concentration:  able to focus   Fund of Knowledge:  intact and appropriate to age and level of education     Assessment and Diagnosis   Status/Progress: Based on the examination today, the patient's problem(s) is/are improved and adequately but not ideally controlled.  New problems have not been presented today.   Co-morbidities and Lack of compliance are not complicating management of the primary condition.  There are no active rule-out diagnoses for this patient at this time.     General Impression:       ICD-10-CM ICD-9-CM   1. ADHD (attention deficit hyperactivity disorder), inattentive type  F90.0 314.00   2. Generalized anxiety disorder  F41.1 300.02   3. Anxiety  F41.9 300.00     Intervention/Counseling/Treatment Plan   Medication Management: The risks and benefits of medication were discussed with the patient.  Continue Adderall XR 30 mg po daily (3-months Rx printed)  Continue Celexa 20 mg po daily  Continue Xanax 0.25 mg po BID PRN    Return to Clinic: 3 months    Risks, benefits, side effects and alternative treatments discussed with patient. Patient agrees with the current plan as documented.  Encouraged Patient to keep future appointments.  Take medications as prescribed and abstain from substance abuse.  Pt to present to ED for thoughts to harm herself or others

## 2022-12-12 ENCOUNTER — PATIENT MESSAGE (OUTPATIENT)
Dept: PRIMARY CARE CLINIC | Facility: CLINIC | Age: 42
End: 2022-12-12
Payer: COMMERCIAL

## 2023-01-10 NOTE — PROGRESS NOTES
Diagnosis: Hodgkin's Lymphoma  Regimen: A-AVD  Cycle/Day: Cycle 2 Day 1  Is this a C1D1 appt?  No    Dr. Ramirez is supervising clinician today.    ECOG:   ECOG [01/10/23 1015]   ECOG Performance Status 1       Review and verified Advanced Directives: No, patient has no interest in completing Advanced Directives at this time    Verified if patient has state DNR bracelet on: No; Full Code    Nursing Assessment:   A focused nursing assessment addressing the toxicity of chemotherapy was performed and the patient reports the following:    Anxiety/Depression/Insomnia: Anxiety: No, Depression: No and Insomnia: Yes: hx of, no new issues/concerns voiced  Pain: YES, Pain Ratin, Location: back, Pain Description: sore  During this visit, medication was administered to treat pain: No          Toxicity Assessment    Adverse Events?  Adverse Events: Yes (other)    Auditory/Ear  Assessment: Yes (w/ Exceptions to WDL)  Tinnitus: None Present  Hearing Impaired: Grade 1 - see row information (hx of)    Cardiac General  Assessment: Yes (w/ Exceptions to WDL)  Hypotension: None Present  Hypertension: Grade 1  Localized Edema: None Present  Palpitations: None Present    Constitutional  Assessment: Yes (w/ Exceptions to WDL)  Fatigue: Grade 1  Fever: None Present  Insomnia: Grade 1 (pt continues to fluctuate with good and bad nights of sleep; \"If I'm tired I don't fight it, I take a nap.\")  Chills: None Present  Weight Gain: None Present  Weight Loss: None Present    Dermatology/Skin  Assessment: Yes (w/ Exceptions to WDL)  Rash Acneiform: None Present  Alopecia: Grade 2  Bruising: None Present  Skin Hyperpigmentation: None Present  Injection Site Reaction: None Present  Nail Discoloration: None Present  Pruritus: None Present  Urticaria: None Present  Hand-Foot Syndrome: None Present    Endocrine  Assessment: Yes (Within Defined Limits)  Hot Flashes: None Present    Gastrointestinal  Assessment: Yes (w/ Exceptions to  Outpatient Psychiatry Follow-Up Visit (MD/NP)    3/13/2020    Clinical Status of Patient:  Outpatient (Ambulatory)    Chief Complaint:  Ruma Guillen is a 39 y.o. female who presents today for follow-up of anxiety and attention problems.  Met with patient.      Last visit was: 12/24/19. Chart and  reviewed    Interval History and Content of Current Session:  Current Psychiatric Medications/changes  · Continue Adderall XR 30 mg po daily (3-months Rx printed)  · Continue Celexa 20 mg po daily  · Continue Xanax 0.25 mg po BID PRN    Discussed situational stressors: ( AC is broken, just had appendix surgery).  Copng well. Pt reports effective results from medications and denies side effects.   Denies SI/HI/AVH.  Will continue medications.       Psychotherapy:  · Target symptoms: distractability, anxiety , work stress  · Why chosen therapy is appropriate versus another modality: relevant to diagnosis  · Outcome monitoring methods: self-report  · Therapeutic intervention type: behavior modifying psychotherapy  · Topics discussed/themes: building skills sets for symptom management, symptom recognition  · The patient's response to the intervention is accepting. The patient's progress toward treatment goals is good.   · Duration of intervention: 11 minutes.    Review of Systems   · PSYCHIATRIC: Pertinant items are noted in the narrative.  · CONSTITUTIONAL: No weight gain or loss.   · MUSCULOSKELETAL: No pain or stiffness of the joints.  · NEUROLOGIC: No weakness, sensory changes, seizures, confusion, memory loss, tremor or other abnormal movements.  · ENDOCRINE: No polydipsia or polyuria.  · INTEGUMENTARY: No rashes or lacerations.  · EYES: No exophthalmos, jaundice or blindness.  · ENT: No dizziness, tinnitus or hearing loss.  · RESPIRATORY: No shortness of breath.  · CARDIOVASCULAR: No tachycardia or chest pain.  · GASTROINTESTINAL: No nausea, vomiting, pain, constipation or diarrhea.  · GENITOURINARY: No frequency,  "dysuria or sexual dysfunction.  · HEMATOLOGIC/LYMPHATIC: No excessive bleeding, prolonged or excessive bleeding after dental extraction/injury.  · ALLERGIC/IMMUNOLOGIC: No allergic response to materials, foods or animals at this time.    Past Medical, Family and Social History: The patient's past medical, family and social history have been reviewed and updated as appropriate within the electronic medical record - see encounter notes.    Compliance: yes    Side effects: None    Risk Parameters:  Patient reports no suicidal ideation  Patient reports no homicidal ideation  Patient reports no self-injurious behavior  Patient reports no violent behavior    Exam (detailed: at least 9 elements; comprehensive: all 15 elements)   Constitutional  Vitals:  Most recent vital signs, dated greater than 90 days prior to this appointment, were reviewed.   Vitals:    03/13/20 1627   BP: 121/74   Pulse: 91   Weight: 97.8 kg (215 lb 9.8 oz)   Height: 5' 10" (1.778 m)        General:  unremarkable, age appropriate     Musculoskeletal  Muscle Strength/Tone:  no tremor, no tic   Gait & Station:  non-ataxic     Psychiatric  Speech:  no latency; no press   Mood & Affect:  euthymic  congruent and appropriate   Thought Process:  normal and logical   Associations:  intact   Thought Content:  normal, no suicidality, no homicidality, delusions, or paranoia   Insight:  intact   Judgement: behavior is adequate to circumstances   Orientation:  grossly intact   Memory: intact for content of interview   Language: grossly intact   Attention Span & Concentration:  able to focus   Fund of Knowledge:  intact and appropriate to age and level of education     Assessment and Diagnosis   Status/Progress: Based on the examination today, the patient's problem(s) is/are improved and adequately but not ideally controlled.  New problems have not been presented today.   Co-morbidities and Lack of compliance are not complicating management of the primary " WDL)  Anorexia: Grade 1  Constipation: Grade 1 (pt voiced switching over to Dulcolax which was effective for him)  Dehydration: None Present  Diarrhea: None Present  Dry Mouth: None Present  Dysphagia: None Present  Mucositis Oral: None Present  Nausea: None Present  Vomiting: None Present    Hemorrhage/Bleeding  Assessment: Yes (Within Defined Limits)  Purpura: None Present  Petechiae: None Present    Infection  Assessment: Yes (Within Defined Limits)  Infection : 0  Febrile Neutropenia: None Present    Lymphatics  Assessment: Yes (Within Defined Limits)  Lymphedema: None Present    Musculoskeletal  Assessment: Yes (Within Defined Limits)  Generalized Muscle Weakness: None Present    Neurology  Assessment: Yes (Within Defined Limits)  Ataxia: None Present  Confusion: None Present  Dizziness: None Present (pt voiced one episode of light-headedness which resolved without difficulty; pt denied any further issues/concerns at this time)  Memory Impairment: None Present  Paresthesia: None Present  Depression: None Present  Anxiety: None Present    Ocular  Assessment: Yes (Within Defined Limits)  Eye Disorders: None Present    Pain  Assessment: Yes (w/ Exceptions to WDL)  Pain: Grade 1    Pulmonary/Upper Respiratory  Assessment: Yes (w/ Exceptions to WDL)  Cough: Grade 1 (\"With the quitting of smoking.\" Otherwise, pt denied issues/concerns at this time)  Dyspnea: None Present  Hiccups: None Present    Genitourinary  Assessment: Yes (Within Defined Limits)  Urinary Incontinence: None Present  Urinary Frequency: None Present  Urinary Discoloration: None Present    Pre-Treatment: Treatment consent signed  Patient has valid pre-authorization  VS completed  Height and weight verified  BSA independently double checked & verified by two practitioners  Premed orders, including hydration, are verified prior to administration  Treatment parameters verified in patient protocol  Chemotherapy doses independently doubled checked &  condition.  There are no active rule-out diagnoses for this patient at this time.     General Impression:       ICD-10-CM ICD-9-CM   1. ADHD (attention deficit hyperactivity disorder), inattentive type F90.0 314.00   2. Anxiety F41.9 300.00       Intervention/Counseling/Treatment Plan   · Medication Management: The risks and benefits of medication were discussed with the patient.  · Continue Adderall XR 30 mg po daily (3-months Rx printed)  · Continue Celexa 20 mg po daily  · Continue Xanax 0.25 mg po BID PRN    Return to Clinic: 6 months    Risks, benefits, side effects and alternative treatments discussed with patient. Patient agrees with the current plan as documented.  Encouraged Patient to keep future appointments.  Take medications as prescribed and abstain from substance abuse.  Pt to present to ED for thoughts to harm herself or others   verified by two practitioners  Verified patient has not reached the Cumulative lifetime dose of Doxorubicin 450 mg/m2    Treatment: I have reviewed the following with the patient:  Name of chemo drug, duration and route of infusion, and reportable infusion-related symptoms.  Chemotherapy has not ; double checked & verified by two practitioners  Appearance and physical integrity of drugs meets standard of drug monograph; double checked & verified by two practitioners  Rate set on infusion pump is in alignment with ordered rate; double checked & verified by two practitioners  Blood return confirmed before, during and after treatment administered  Drugs were administered in proper sequencing  Refer to LDA and MAR for line assessment and medication administration    Post Treatment: Treatment tolerated well; no adverse reaction    Transfusion: Not needed    Integrative Medicine: No    Oral Chemotherapy: No    Education: No new instructions needed    Next appointment scheduled: 23  Patient instructed to call the office with any questions or concerns.    Patient Discharged: patient discharged to home per self, ambulatory    Pain \"5\"    (0-10 scale)    Fall risk \"20\"  (Robins Fall Risk)    ECOG 1  (Performance Status)   0 - Fully active, able to carry on all predisease activites without restrictions.1 - No physically strenuous activity, but ambulatory and able to carry out light or sedentary work.2 - Ambulatory/capable of self-care, unable to perform work activities. Up & about more than 50% of the day.3 - Capable of only limited self-care, confined to bed or chair more than 50% of waking hours.4 - Completely disabled, totally confined to bed or chair. Cannot carry on any self-care.    Pysch/Social No psych/social concerns voiced in office today    Abuse/Neglect - No abuse/neglect concerns identified

## 2023-01-19 ENCOUNTER — TELEPHONE (OUTPATIENT)
Dept: OBSTETRICS AND GYNECOLOGY | Facility: CLINIC | Age: 43
End: 2023-01-19
Payer: COMMERCIAL

## 2023-01-19 ENCOUNTER — OFFICE VISIT (OUTPATIENT)
Dept: OBSTETRICS AND GYNECOLOGY | Facility: CLINIC | Age: 43
End: 2023-01-19
Payer: COMMERCIAL

## 2023-01-19 VITALS — BODY MASS INDEX: 33.21 KG/M2 | DIASTOLIC BLOOD PRESSURE: 88 MMHG | WEIGHT: 231.5 LBS | SYSTOLIC BLOOD PRESSURE: 120 MMHG

## 2023-01-19 DIAGNOSIS — Z01.419 ENCOUNTER FOR WELL WOMAN EXAM WITH ROUTINE GYNECOLOGICAL EXAM: Primary | ICD-10-CM

## 2023-01-19 DIAGNOSIS — Z12.31 ENCOUNTER FOR SCREENING MAMMOGRAM FOR MALIGNANT NEOPLASM OF BREAST: ICD-10-CM

## 2023-01-19 DIAGNOSIS — Z80.41 FAMILY HISTORY OF OVARIAN CANCER: ICD-10-CM

## 2023-01-19 PROCEDURE — 99396 PREV VISIT EST AGE 40-64: CPT | Mod: S$GLB,,, | Performed by: OBSTETRICS & GYNECOLOGY

## 2023-01-19 PROCEDURE — 1159F MED LIST DOCD IN RCRD: CPT | Mod: CPTII,S$GLB,, | Performed by: OBSTETRICS & GYNECOLOGY

## 2023-01-19 PROCEDURE — 3008F PR BODY MASS INDEX (BMI) DOCUMENTED: ICD-10-PCS | Mod: CPTII,S$GLB,, | Performed by: OBSTETRICS & GYNECOLOGY

## 2023-01-19 PROCEDURE — 99999 PR PBB SHADOW E&M-EST. PATIENT-LVL III: ICD-10-PCS | Mod: PBBFAC,,, | Performed by: OBSTETRICS & GYNECOLOGY

## 2023-01-19 PROCEDURE — 3079F PR MOST RECENT DIASTOLIC BLOOD PRESSURE 80-89 MM HG: ICD-10-PCS | Mod: CPTII,S$GLB,, | Performed by: OBSTETRICS & GYNECOLOGY

## 2023-01-19 PROCEDURE — 1160F PR REVIEW ALL MEDS BY PRESCRIBER/CLIN PHARMACIST DOCUMENTED: ICD-10-PCS | Mod: CPTII,S$GLB,, | Performed by: OBSTETRICS & GYNECOLOGY

## 2023-01-19 PROCEDURE — 3008F BODY MASS INDEX DOCD: CPT | Mod: CPTII,S$GLB,, | Performed by: OBSTETRICS & GYNECOLOGY

## 2023-01-19 PROCEDURE — 1159F PR MEDICATION LIST DOCUMENTED IN MEDICAL RECORD: ICD-10-PCS | Mod: CPTII,S$GLB,, | Performed by: OBSTETRICS & GYNECOLOGY

## 2023-01-19 PROCEDURE — 3074F SYST BP LT 130 MM HG: CPT | Mod: CPTII,S$GLB,, | Performed by: OBSTETRICS & GYNECOLOGY

## 2023-01-19 PROCEDURE — 3074F PR MOST RECENT SYSTOLIC BLOOD PRESSURE < 130 MM HG: ICD-10-PCS | Mod: CPTII,S$GLB,, | Performed by: OBSTETRICS & GYNECOLOGY

## 2023-01-19 PROCEDURE — 3079F DIAST BP 80-89 MM HG: CPT | Mod: CPTII,S$GLB,, | Performed by: OBSTETRICS & GYNECOLOGY

## 2023-01-19 PROCEDURE — 99999 PR PBB SHADOW E&M-EST. PATIENT-LVL III: CPT | Mod: PBBFAC,,, | Performed by: OBSTETRICS & GYNECOLOGY

## 2023-01-19 PROCEDURE — 99396 PR PREVENTIVE VISIT,EST,40-64: ICD-10-PCS | Mod: S$GLB,,, | Performed by: OBSTETRICS & GYNECOLOGY

## 2023-01-19 PROCEDURE — 1160F RVW MEDS BY RX/DR IN RCRD: CPT | Mod: CPTII,S$GLB,, | Performed by: OBSTETRICS & GYNECOLOGY

## 2023-01-19 NOTE — ASSESSMENT & PLAN NOTE
Normal breast and pelvic exams except as noted in documentation. Pap/cotesting up to date. MMG ordered today, will schedule. Continue breast self awareness, recommend 30 minutes of exercise 5 times weekly. RTC 1 year for annual exam, sooner PRN. Follow up with PCP for routine health maintenance.

## 2023-01-19 NOTE — PROGRESS NOTES
Chief Complaint: Annual exam    Chief Complaint   Patient presents with    Annual Exam       HPI:   42 y.o.  here today for annual exam, new patient to me. Denies any changes to health history since last visit. Patient reports her cycles are monthly lasting 5 days using 4 PPD on heavy days with cramping. Denies any irregular menstrual bleeding or post-coital bleeding. Denies abnormal breast symptoms. Reports mother and grandmother with history of ovarian cancer, possible great grandmother with breast, otherwise negative FH of uterine, or colon cancer. Patient is doing well today with no complaints or concerns. She is currently sexually active. Currently using nothing for birth control,  with extensive medical history. She has been vaccinated against COVID-19.    LMP Dates from Last 1 Encounters:   LMP: 2023       Labs / Significant Studies    Pap (10/2020): NILM/HPV neg   MMG: Needs  Colonoscopy: N/A  DEXA: N/A    No visits with results within 6 Month(s) from this visit.   Latest known visit with results is:   Lab Visit on 2021   Component Date Value Ref Range Status    WBC 2021 6.27  3.90 - 12.70 K/uL Final    RBC 2021 4.68  4.00 - 5.40 M/uL Final    Hemoglobin 2021 13.5  12.0 - 16.0 g/dL Final    Hematocrit 2021 42.0  37.0 - 48.5 % Final    MCV 2021 90  82 - 98 fL Final    MCH 2021 28.8  27.0 - 31.0 pg Final    MCHC 2021 32.1  32.0 - 36.0 g/dL Final    RDW 2021 12.6  11.5 - 14.5 % Final    Platelets 2021 262  150 - 450 K/uL Final    MPV 2021 10.4  9.2 - 12.9 fL Final    Immature Granulocytes 2021 0.6 (H)  0.0 - 0.5 % Final    Gran # (ANC) 2021 3.4  1.8 - 7.7 K/uL Final    Immature Grans (Abs) 2021 0.04  0.00 - 0.04 K/uL Final    Comment: Mild elevation in immature granulocytes is non specific and   can be seen in a variety of conditions including stress response,   acute inflammation, trauma and pregnancy.  Correlation with other   laboratory and clinical findings is essential.      Lymph # 11/08/2021 2.1  1.0 - 4.8 K/uL Final    Mono # 11/08/2021 0.5  0.3 - 1.0 K/uL Final    Eos # 11/08/2021 0.2  0.0 - 0.5 K/uL Final    Baso # 11/08/2021 0.03  0.00 - 0.20 K/uL Final    nRBC 11/08/2021 0  0 /100 WBC Final    Gran % 11/08/2021 54.8  38.0 - 73.0 % Final    Lymph % 11/08/2021 32.7  18.0 - 48.0 % Final    Mono % 11/08/2021 7.7  4.0 - 15.0 % Final    Eosinophil % 11/08/2021 3.7  0.0 - 8.0 % Final    Basophil % 11/08/2021 0.5  0.0 - 1.9 % Final    Differential Method 11/08/2021 Automated   Final    Hemoglobin A1C 11/08/2021 5.0  4.0 - 5.6 % Final    Comment: ADA Screening Guidelines:  5.7-6.4%  Consistent with prediabetes  >or=6.5%  Consistent with diabetes    High levels of fetal hemoglobin interfere with the HbA1C  assay. Heterozygous hemoglobin variants (HbS, HgC, etc)do  not significantly interfere with this assay.   However, presence of multiple variants may affect accuracy.      Estimated Avg Glucose 11/08/2021 97  68 - 131 mg/dL Final    Sodium 11/08/2021 140  136 - 145 mmol/L Final    Potassium 11/08/2021 4.2  3.5 - 5.1 mmol/L Final    Chloride 11/08/2021 105  95 - 110 mmol/L Final    CO2 11/08/2021 27  23 - 29 mmol/L Final    Glucose 11/08/2021 86  70 - 110 mg/dL Final    BUN 11/08/2021 12  6 - 20 mg/dL Final    Creatinine 11/08/2021 0.7  0.5 - 1.4 mg/dL Final    Calcium 11/08/2021 9.7  8.7 - 10.5 mg/dL Final    Total Protein 11/08/2021 7.6  6.0 - 8.4 g/dL Final    Albumin 11/08/2021 4.2  3.5 - 5.2 g/dL Final    Total Bilirubin 11/08/2021 0.5  0.1 - 1.0 mg/dL Final    Comment: For infants and newborns, interpretation of results should be based  on gestational age, weight and in agreement with clinical  observations.    Premature Infant recommended reference ranges:  Up to 24 hours.............<8.0 mg/dL  Up to 48 hours............<12.0 mg/dL  3-5 days..................<15.0 mg/dL  6-29 days.................<15.0  mg/dL      Alkaline Phosphatase 11/08/2021 67  55 - 135 U/L Final    AST 11/08/2021 13  10 - 40 U/L Final    ALT 11/08/2021 15  10 - 44 U/L Final    Anion Gap 11/08/2021 8  8 - 16 mmol/L Final    eGFR if African American 11/08/2021 >60.0  >60 mL/min/1.73 m^2 Final    eGFR if non African American 11/08/2021 >60.0  >60 mL/min/1.73 m^2 Final    Comment: Calculation used to obtain the estimated glomerular filtration  rate (eGFR) is the CKD-EPI equation.       Cholesterol 11/08/2021 215 (H)  120 - 199 mg/dL Final    Comment: The National Cholesterol Education Program (NCEP) has set the  following guidelines (reference ranges) for Cholesterol:  Optimal.....................<200 mg/dL  Borderline High.............200-239 mg/dL  High........................> or = 240 mg/dL      Triglycerides 11/08/2021 80  30 - 150 mg/dL Final    Comment: The National Cholesterol Education Program (NCEP) has set the  following guidelines (reference values) for triglycerides:  Normal......................<150 mg/dL  Borderline High.............150-199 mg/dL  High........................200-499 mg/dL      HDL 11/08/2021 48  40 - 75 mg/dL Final    Comment: The National Cholesterol Education Program (NCEP) has set the  following guidelines (reference values) for HDL Cholesterol:  Low...............<40 mg/dL  Optimal...........>60 mg/dL      LDL Cholesterol 11/08/2021 151.0  63.0 - 159.0 mg/dL Final    Comment: The National Cholesterol Education Program (NCEP) has set the  following guidelines (reference values) for LDL Cholesterol:  Optimal.......................<130 mg/dL  Borderline High...............130-159 mg/dL  High..........................160-189 mg/dL  Very High.....................>190 mg/dL      HDL/Cholesterol Ratio 11/08/2021 22.3  20.0 - 50.0 % Final    Total Cholesterol/HDL Ratio 11/08/2021 4.5  2.0 - 5.0 Final    Non-HDL Cholesterol 11/08/2021 167  mg/dL Final    Comment: Risk category and Non-HDL cholesterol goals:  Coronary  heart disease (CHD)or equivalent (10-year risk of CHD >20%):  Non-HDL cholesterol goal     <130 mg/dL  Two or more CHD risk factors and 10-year risk of CHD <= 20%:  Non-HDL cholesterol goal     <160 mg/dL  0 to 1 CHD risk factor:  Non-HDL cholesterol goal     <190 mg/dL      TSH 11/08/2021 1.226  0.400 - 4.000 uIU/mL Final    Ferritin 11/08/2021 35  20.0 - 300.0 ng/mL Final    Iron 11/08/2021 94  30 - 160 ug/dL Final    Transferrin 11/08/2021 292  200 - 375 mg/dL Final    TIBC 11/08/2021 432  250 - 450 ug/dL Final    Saturated Iron 11/08/2021 22  20 - 50 % Final        Past Medical History:   Diagnosis Date    Anxiety     Headache, migraine     Hx of psychiatric care     KASSIDY (iron deficiency anemia)     Psychiatric problem     Seizures     childhood seizures - last seizure >28 yrs.    Sleep difficulties     Therapy      Past Surgical History:   Procedure Laterality Date    APPENDECTOMY  03/2020    WISDOM TOOTH EXTRACTION         Current Outpatient Medications:     ALPRAZolam (XANAX) 0.25 MG tablet, Take 1 tablet (0.25 mg total) by mouth 2 (two) times daily as needed for Anxiety., Disp: 30 tablet, Rfl: 5    aspirin-acetaminophen-caffeine 250-250-65 mg (EXCEDRIN MIGRAINE) 250-250-65 mg per tablet, Take 1 tablet by mouth every 6 (six) hours as needed for Pain., Disp: , Rfl:     cetirizine (ZYRTEC) 10 MG tablet, Take 10 mg by mouth once daily., Disp: , Rfl:     citalopram (CELEXA) 20 MG tablet, Take 1 tablet (20 mg total) by mouth once daily., Disp: 90 tablet, Rfl: 3    dextroamphetamine-amphetamine (ADDERALL XR) 30 MG 24 hr capsule, Take 1 capsule (30 mg total) by mouth every morning., Disp: 30 capsule, Rfl: 0    dextroamphetamine-amphetamine (ADDERALL XR) 30 MG 24 hr capsule, Take 1 capsule (30 mg total) by mouth every morning., Disp: 30 capsule, Rfl: 0    [START ON 2/1/2023] dextroamphetamine-amphetamine (ADDERALL XR) 30 MG 24 hr capsule, Take 1 capsule (30 mg total) by mouth every morning., Disp: 30 capsule, Rfl:  0    ferrous sulfate (FEOSOL) 325 mg (65 mg iron) Tab tablet, Take 65 mg by mouth daily with breakfast., Disp: , Rfl:     FLUCELVAX QUAD 7528-6291, PF, 60 mcg (15 mcg x 4)/0.5 mL Syrg, PHARMACY ADMINISTERED, Disp: , Rfl:   Review of patient's allergies indicates:  No Known Allergies  OB History    Para Term  AB Living   0 0 0 0 0 0   SAB IAB Ectopic Multiple Live Births   0 0 0 0     Obstetric Comments   Menarche-      Social History     Tobacco Use    Smoking status: Former     Packs/day: 0.25     Years: 5.00     Pack years: 1.25     Types: Cigarettes    Smokeless tobacco: Never    Tobacco comments:     monthly - 2-3 cigarettes   Substance Use Topics    Alcohol use: Yes     Alcohol/week: 0.0 standard drinks     Comment: occasionally    Drug use: No     Family History   Problem Relation Age of Onset    Arthritis Mother     Irritable bowel syndrome Mother     Mental illness Mother     Miscarriages / Stillbirths Mother     Heart disease Maternal Grandfather     Cancer Maternal Grandfather     Diabetes Maternal Grandfather     Breast cancer Neg Hx     Colon cancer Neg Hx     Ovarian cancer Neg Hx        Review of Systems   Negative except as in HPI     Physical Exam   Vitals:    23 0930   BP: 120/88     Body mass index is 33.21 kg/m².    Physical Exam  Constitutional:       General: She is not in acute distress.     Appearance: Normal appearance.   Genitourinary:      Vulva, bladder and urethral meatus normal.      No vaginal discharge or bleeding.        Right Adnexa: not tender, not full and no mass present.     Left Adnexa: not tender, not full and no mass present.     No cervical motion tenderness or lesion.      Uterus is not tender.      No uterine mass detected.     Uterus is anteverted.      Bladder is not tender.       Pelvic exam was performed with patient in the lithotomy position.   Breasts:     Right: Normal. No mass, nipple discharge, skin change or tenderness.      Left: Normal.  No mass, nipple discharge, skin change or tenderness.   HENT:      Head: Normocephalic and atraumatic.   Neck:      Thyroid: No thyroid mass, thyromegaly or thyroid tenderness.   Pulmonary:      Effort: Pulmonary effort is normal.   Abdominal:      General: Bowel sounds are normal. There is no distension.      Palpations: Abdomen is soft. There is no mass.      Tenderness: There is no abdominal tenderness. There is no guarding.   Musculoskeletal:         General: Normal range of motion.      Cervical back: Normal range of motion.   Lymphadenopathy:      Cervical: No cervical adenopathy.      Upper Body:      Right upper body: No supraclavicular, axillary or pectoral adenopathy.      Left upper body: No supraclavicular, axillary or pectoral adenopathy.   Neurological:      General: No focal deficit present.      Mental Status: She is alert and oriented to person, place, and time.   Skin:     General: Skin is warm and dry.   Psychiatric:         Mood and Affect: Mood normal.         Behavior: Behavior normal.         Thought Content: Thought content normal.         Judgment: Judgment normal.   Exam conducted with a chaperone present.        ASSESSMENT:   Annual Well Women Exam  Patient Active Problem List   Diagnosis    Anxiety    KASSIDY (iron deficiency anemia)    History of epilepsy    ADHD (attention deficit hyperactivity disorder), inattentive type    Foot pain    Impaired strength of lower extremity    Decreased range of motion of right foot    Tight heelcords, acquired, right    Pain aggravated by standing    Encounter for well woman exam with routine gynecological exam    Family history of ovarian cancer     Health Maintenance Due   Topic Date Due    Mammogram  Never done    COVID-19 Vaccine (3 - Booster for Moderna series) 05/25/2021     Health Maintenance Topics with due status: Not Due       Topic Last Completion Date    TETANUS VACCINE 12/05/2016    Cervical Cancer Screening 10/12/2020    Hemoglobin A1c  (Diabetic Prevention Screening) 11/08/2021       PLAN:  Problem List Items Addressed This Visit          Renal/    Encounter for well woman exam with routine gynecological exam - Primary    Current Assessment & Plan     Normal breast and pelvic exams except as noted in documentation. Pap/cotesting up to date. MMG ordered today, will schedule. Continue breast self awareness, recommend 30 minutes of exercise 5 times weekly. RTC 1 year for annual exam, sooner PRN. Follow up with PCP for routine health maintenance.            Relevant Orders    Mammo Digital Screening Bilat w/ [facility]       Oncology    Family history of ovarian cancer    Current Assessment & Plan     Mother diagnosed with ovarian cancer, grandmother passed from it, great-grandmother with possible breast cancer. Unsure about family status of BRCA testing. Patient interested in genetic screening as well as possible CA-125. Counseled that CA-125 can be non-specific and elevated in a variety of other benign conditions. Normal pelvic exam today. History of ovarian cysts as a teen.          Relevant Orders    Ambulatory referral/consult to Genetics     Other Visit Diagnoses       Encounter for screening mammogram for malignant neoplasm of breast                Follow up if symptoms worsen or fail to improve.       Raina Lopez MD  Department of Obstetrics & Gynecology  [facility]

## 2023-01-19 NOTE — ASSESSMENT & PLAN NOTE
Mother diagnosed with ovarian cancer, grandmother passed from it, great-grandmother with possible breast cancer. Unsure about family status of BRCA testing. Patient interested in genetic screening as well as possible CA-125. Counseled that CA-125 can be non-specific and elevated in a variety of other benign conditions. Normal pelvic exam today. History of ovarian cysts as a teen.

## 2023-01-20 ENCOUNTER — HOSPITAL ENCOUNTER (OUTPATIENT)
Dept: RADIOLOGY | Facility: HOSPITAL | Age: 43
Discharge: HOME | End: 2023-01-20
Attending: OBSTETRICS & GYNECOLOGY
Payer: COMMERCIAL

## 2023-01-20 DIAGNOSIS — Z01.419 ENCOUNTER FOR WELL WOMAN EXAM WITH ROUTINE GYNECOLOGICAL EXAM: ICD-10-CM

## 2023-01-20 PROCEDURE — 77067 SCR MAMMO BI INCL CAD: CPT | Mod: TC,PO

## 2023-01-20 PROCEDURE — 77067 SCR MAMMO BI INCL CAD: CPT | Mod: 26,,, | Performed by: RADIOLOGY

## 2023-01-20 PROCEDURE — 77063 BREAST TOMOSYNTHESIS BI: CPT | Mod: 26,,, | Performed by: RADIOLOGY

## 2023-01-20 PROCEDURE — 77067 MAMMO DIGITAL SCREENING BILAT WITH TOMO: ICD-10-PCS | Mod: 26,,, | Performed by: RADIOLOGY

## 2023-01-20 PROCEDURE — 77063 MAMMO DIGITAL SCREENING BILAT WITH TOMO: ICD-10-PCS | Mod: 26,,, | Performed by: RADIOLOGY

## 2023-01-22 DIAGNOSIS — Z91.89 AT HIGH RISK FOR BREAST CANCER: Primary | ICD-10-CM

## 2023-01-23 ENCOUNTER — PATIENT MESSAGE (OUTPATIENT)
Dept: HEMATOLOGY/ONCOLOGY | Facility: CLINIC | Age: 43
End: 2023-01-23
Payer: COMMERCIAL

## 2023-03-14 ENCOUNTER — PATIENT MESSAGE (OUTPATIENT)
Dept: PSYCHIATRY | Facility: CLINIC | Age: 43
End: 2023-03-14
Payer: COMMERCIAL

## 2023-03-15 ENCOUNTER — OFFICE VISIT (OUTPATIENT)
Dept: PSYCHIATRY | Facility: CLINIC | Age: 43
End: 2023-03-15
Payer: COMMERCIAL

## 2023-03-15 DIAGNOSIS — F41.9 ANXIETY: ICD-10-CM

## 2023-03-15 DIAGNOSIS — F90.0 ADHD (ATTENTION DEFICIT HYPERACTIVITY DISORDER), INATTENTIVE TYPE: ICD-10-CM

## 2023-03-15 PROCEDURE — 1159F MED LIST DOCD IN RCRD: CPT | Mod: CPTII,95,, | Performed by: NURSE PRACTITIONER

## 2023-03-15 PROCEDURE — 1160F RVW MEDS BY RX/DR IN RCRD: CPT | Mod: CPTII,95,, | Performed by: NURSE PRACTITIONER

## 2023-03-15 PROCEDURE — 1160F PR REVIEW ALL MEDS BY PRESCRIBER/CLIN PHARMACIST DOCUMENTED: ICD-10-PCS | Mod: CPTII,95,, | Performed by: NURSE PRACTITIONER

## 2023-03-15 PROCEDURE — 1159F PR MEDICATION LIST DOCUMENTED IN MEDICAL RECORD: ICD-10-PCS | Mod: CPTII,95,, | Performed by: NURSE PRACTITIONER

## 2023-03-15 PROCEDURE — 99213 OFFICE O/P EST LOW 20 MIN: CPT | Mod: 95,,, | Performed by: NURSE PRACTITIONER

## 2023-03-15 PROCEDURE — 99213 PR OFFICE/OUTPT VISIT, EST, LEVL III, 20-29 MIN: ICD-10-PCS | Mod: 95,,, | Performed by: NURSE PRACTITIONER

## 2023-03-15 RX ORDER — CITALOPRAM 20 MG/1
20 TABLET, FILM COATED ORAL DAILY
Qty: 90 TABLET | Refills: 3 | Status: SHIPPED | OUTPATIENT
Start: 2023-03-15 | End: 2023-07-03 | Stop reason: SDUPTHER

## 2023-03-15 RX ORDER — DEXTROAMPHETAMINE SACCHARATE, AMPHETAMINE ASPARTATE MONOHYDRATE, DEXTROAMPHETAMINE SULFATE AND AMPHETAMINE SULFATE 7.5; 7.5; 7.5; 7.5 MG/1; MG/1; MG/1; MG/1
30 CAPSULE, EXTENDED RELEASE ORAL EVERY MORNING
Qty: 30 CAPSULE | Refills: 0 | Status: SHIPPED | OUTPATIENT
Start: 2023-03-15 | End: 2023-07-03

## 2023-03-15 RX ORDER — DEXTROAMPHETAMINE SACCHARATE, AMPHETAMINE ASPARTATE MONOHYDRATE, DEXTROAMPHETAMINE SULFATE AND AMPHETAMINE SULFATE 7.5; 7.5; 7.5; 7.5 MG/1; MG/1; MG/1; MG/1
30 CAPSULE, EXTENDED RELEASE ORAL EVERY MORNING
Qty: 30 CAPSULE | Refills: 0 | Status: SHIPPED | OUTPATIENT
Start: 2023-04-14 | End: 2023-07-03

## 2023-03-15 RX ORDER — ALPRAZOLAM 0.25 MG/1
0.25 TABLET ORAL 2 TIMES DAILY PRN
Qty: 30 TABLET | Refills: 5 | Status: SHIPPED | OUTPATIENT
Start: 2023-03-15 | End: 2023-07-03 | Stop reason: SDUPTHER

## 2023-03-15 RX ORDER — DEXTROAMPHETAMINE SACCHARATE, AMPHETAMINE ASPARTATE MONOHYDRATE, DEXTROAMPHETAMINE SULFATE AND AMPHETAMINE SULFATE 7.5; 7.5; 7.5; 7.5 MG/1; MG/1; MG/1; MG/1
30 CAPSULE, EXTENDED RELEASE ORAL EVERY MORNING
Qty: 30 CAPSULE | Refills: 0 | Status: SHIPPED | OUTPATIENT
Start: 2023-05-14 | End: 2023-07-03

## 2023-03-15 NOTE — PROGRESS NOTES
Outpatient Psychiatry Follow-Up Visit (MD/NP)    3/15/2023    Clinical Status of Patient:  Outpatient (Ambulatory)    Chief Complaint:  Ruma Guillen is a 42 y.o. female who presents today for follow-up of anxiety and attention problems.  Met with patient.      Last visit was: 9/02/22. Chart and  reviewed  The patient location is: home  The chief complaint leading to consultation is: ADHD, anxiety    Visit type: audiovisual    Face to Face time with patient: 30 minutes  30 minutes of total time spent on the encounter, which includes face to face time and non-face to face time preparing to see the patient (eg, review of tests), Obtaining and/or reviewing separately obtained history, Documenting clinical information in the electronic or other health record, Independently interpreting results (not separately reported) and communicating results to the patient/family/caregiver, or Care coordination (not separately reported).     Each patient to whom he or she provides medical services by telemedicine is:  (1) informed of the relationship between the physician and patient and the respective role of any other health care provider with respect to management of the patient; and (2) notified that he or she may decline to receive medical services by telemedicine and may withdraw from such care at any time.    Interval History and Content of Current Session:  Current Psychiatric Medications/changes  Continue Adderall XR 30 mg po daily (3-months Rx printed)  Continue Celexa 20 mg po daily  Continue Xanax 0.25 mg po BID PRN    Virtual Visit:  Pt presents bright affect and euthymic mood. Pt reports effective results from medications and denies side effects.   Denies unmanaged symptoms of depression, anxiety, or ADHD. Denies SI/HI/AVH.  Will continue medications.      Psychotherapy:  Target symptoms: distractability, anxiety , work stress  Why chosen therapy is appropriate versus another modality: relevant to diagnosis  Outcome  monitoring methods: self-report  Therapeutic intervention type: behavior modifying psychotherapy  Topics discussed/themes: building skills sets for symptom management, symptom recognition  The patient's response to the intervention is accepting. The patient's progress toward treatment goals is good.   Duration of intervention: 11 minutes.    Review of Systems   PSYCHIATRIC: Pertinant items are noted in the narrative.  CONSTITUTIONAL: No weight gain or loss.   MUSCULOSKELETAL: No pain or stiffness of the joints.  NEUROLOGIC: No weakness, sensory changes, seizures, confusion, memory loss, tremor or other abnormal movements.  ENDOCRINE: No polydipsia or polyuria.  INTEGUMENTARY: No rashes or lacerations.  EYES: No exophthalmos, jaundice or blindness.  ENT: No dizziness, tinnitus or hearing loss.  RESPIRATORY: No shortness of breath.  CARDIOVASCULAR: No tachycardia or chest pain.  GASTROINTESTINAL: No nausea, vomiting, pain, constipation or diarrhea.  GENITOURINARY: No frequency, dysuria or sexual dysfunction.  HEMATOLOGIC/LYMPHATIC: No excessive bleeding, prolonged or excessive bleeding after dental extraction/injury.  ALLERGIC/IMMUNOLOGIC: No allergic response to materials, foods or animals at this time.    Past Medical, Family and Social History: The patient's past medical, family and social history have been reviewed and updated as appropriate within the electronic medical record - see encounter notes.    Compliance: yes    Side effects: None    Risk Parameters:  Patient reports no suicidal ideation  Patient reports no homicidal ideation  Patient reports no self-injurious behavior  Patient reports no violent behavior    Exam (detailed: at least 9 elements; comprehensive: all 15 elements)   Constitutional  Vitals:  Most recent vital signs, dated greater than 90 days prior to this appointment, were reviewed.   There were no vitals filed for this visit.     General:  unremarkable, age appropriate      Musculoskeletal  Muscle Strength/Tone:  no tremor, no tic   Gait & Station:  non-ataxic     Psychiatric  Speech:  no latency; no press   Mood & Affect:  euthymic  congruent and appropriate   Thought Process:  normal and logical   Associations:  intact   Thought Content:  normal, no suicidality, no homicidality, delusions, or paranoia   Insight:  intact   Judgement: behavior is adequate to circumstances   Orientation:  grossly intact   Memory: intact for content of interview   Language: grossly intact   Attention Span & Concentration:  able to focus   Fund of Knowledge:  intact and appropriate to age and level of education     Assessment and Diagnosis   Status/Progress: Based on the examination today, the patient's problem(s) is/are improved and adequately but not ideally controlled.  New problems have not been presented today.   Co-morbidities and Lack of compliance are not complicating management of the primary condition.  There are no active rule-out diagnoses for this patient at this time.     General Impression:       ICD-10-CM ICD-9-CM   1. ADHD (attention deficit hyperactivity disorder), inattentive type  F90.0 314.00   2. Anxiety  F41.9 300.00     Intervention/Counseling/Treatment Plan   Medication Management: The risks and benefits of medication were discussed with the patient.  Continue Adderall XR 30 mg po daily (3-months Rx printed)  Continue Celexa 20 mg po daily  Continue Xanax 0.25 mg po BID PRN    Return to Clinic: 3 months    Risks, benefits, side effects and alternative treatments discussed with patient. Patient agrees with the current plan as documented.  Encouraged Patient to keep future appointments.  Take medications as prescribed and abstain from substance abuse.  Pt to present to ED for thoughts to harm herself or others

## 2023-07-03 ENCOUNTER — OFFICE VISIT (OUTPATIENT)
Dept: PSYCHIATRY | Facility: CLINIC | Age: 43
End: 2023-07-03
Payer: COMMERCIAL

## 2023-07-03 VITALS
BODY MASS INDEX: 33.67 KG/M2 | SYSTOLIC BLOOD PRESSURE: 136 MMHG | DIASTOLIC BLOOD PRESSURE: 72 MMHG | WEIGHT: 234.69 LBS | HEART RATE: 80 BPM

## 2023-07-03 DIAGNOSIS — F41.9 ANXIETY: ICD-10-CM

## 2023-07-03 DIAGNOSIS — F41.1 GENERALIZED ANXIETY DISORDER: ICD-10-CM

## 2023-07-03 DIAGNOSIS — F90.0 ADHD (ATTENTION DEFICIT HYPERACTIVITY DISORDER), INATTENTIVE TYPE: Primary | ICD-10-CM

## 2023-07-03 PROCEDURE — 99214 OFFICE O/P EST MOD 30 MIN: CPT | Mod: S$GLB,,, | Performed by: NURSE PRACTITIONER

## 2023-07-03 PROCEDURE — 3008F PR BODY MASS INDEX (BMI) DOCUMENTED: ICD-10-PCS | Mod: CPTII,S$GLB,, | Performed by: NURSE PRACTITIONER

## 2023-07-03 PROCEDURE — 3075F PR MOST RECENT SYSTOLIC BLOOD PRESS GE 130-139MM HG: ICD-10-PCS | Mod: CPTII,S$GLB,, | Performed by: NURSE PRACTITIONER

## 2023-07-03 PROCEDURE — 3008F BODY MASS INDEX DOCD: CPT | Mod: CPTII,S$GLB,, | Performed by: NURSE PRACTITIONER

## 2023-07-03 PROCEDURE — 99214 PR OFFICE/OUTPT VISIT, EST, LEVL IV, 30-39 MIN: ICD-10-PCS | Mod: S$GLB,,, | Performed by: NURSE PRACTITIONER

## 2023-07-03 PROCEDURE — 3078F DIAST BP <80 MM HG: CPT | Mod: CPTII,S$GLB,, | Performed by: NURSE PRACTITIONER

## 2023-07-03 PROCEDURE — 3075F SYST BP GE 130 - 139MM HG: CPT | Mod: CPTII,S$GLB,, | Performed by: NURSE PRACTITIONER

## 2023-07-03 PROCEDURE — 3078F PR MOST RECENT DIASTOLIC BLOOD PRESSURE < 80 MM HG: ICD-10-PCS | Mod: CPTII,S$GLB,, | Performed by: NURSE PRACTITIONER

## 2023-07-03 PROCEDURE — 99999 PR PBB SHADOW E&M-EST. PATIENT-LVL III: ICD-10-PCS | Mod: PBBFAC,,, | Performed by: NURSE PRACTITIONER

## 2023-07-03 PROCEDURE — 99999 PR PBB SHADOW E&M-EST. PATIENT-LVL III: CPT | Mod: PBBFAC,,, | Performed by: NURSE PRACTITIONER

## 2023-07-03 RX ORDER — CITALOPRAM 20 MG/1
20 TABLET, FILM COATED ORAL DAILY
Qty: 90 TABLET | Refills: 3 | Status: SHIPPED | OUTPATIENT
Start: 2023-07-03 | End: 2023-11-30 | Stop reason: SDUPTHER

## 2023-07-03 RX ORDER — ALPRAZOLAM 0.25 MG/1
0.25 TABLET ORAL 2 TIMES DAILY PRN
Qty: 30 TABLET | Refills: 5 | Status: SHIPPED | OUTPATIENT
Start: 2023-07-03 | End: 2023-11-30 | Stop reason: SDUPTHER

## 2023-07-03 RX ORDER — DEXTROAMPHETAMINE SACCHARATE, AMPHETAMINE ASPARTATE, DEXTROAMPHETAMINE SULFATE AND AMPHETAMINE SULFATE 7.5; 7.5; 7.5; 7.5 MG/1; MG/1; MG/1; MG/1
TABLET ORAL
Qty: 30 TABLET | Refills: 0 | Status: SHIPPED | OUTPATIENT
Start: 2023-07-03 | End: 2023-11-30 | Stop reason: SDUPTHER

## 2023-07-03 RX ORDER — DEXTROAMPHETAMINE SACCHARATE, AMPHETAMINE ASPARTATE, DEXTROAMPHETAMINE SULFATE AND AMPHETAMINE SULFATE 7.5; 7.5; 7.5; 7.5 MG/1; MG/1; MG/1; MG/1
TABLET ORAL
Qty: 30 TABLET | Refills: 0 | Status: SHIPPED | OUTPATIENT
Start: 2023-08-02 | End: 2023-11-30 | Stop reason: SDUPTHER

## 2023-07-03 RX ORDER — DEXTROAMPHETAMINE SACCHARATE, AMPHETAMINE ASPARTATE, DEXTROAMPHETAMINE SULFATE AND AMPHETAMINE SULFATE 7.5; 7.5; 7.5; 7.5 MG/1; MG/1; MG/1; MG/1
TABLET ORAL
Qty: 30 TABLET | Refills: 0 | Status: SHIPPED | OUTPATIENT
Start: 2023-09-02 | End: 2023-11-30 | Stop reason: SDUPTHER

## 2023-07-03 NOTE — PROGRESS NOTES
Outpatient Psychiatry Follow-Up Visit (MD/NP)    7/3/2023    Clinical Status of Patient:  Outpatient (Ambulatory)    Chief Complaint:  Ruma Guillen is a 42 y.o. female who presents today for follow-up of anxiety and attention problems.  Met with patient.      Last visit was:3/15/23. Chart and  reviewed    Interval History and Content of Current Session:  Current Psychiatric Medications/changes  Continue Adderall XR 30 mg po daily (3-months Rx printed)  Continue Celexa 20 mg po daily  Continue Xanax 0.25 mg po BID PRN    Pt presents bright affect and euthymic mood. Pt is having trouble finding XR. Will swicth to IR. Discussed family stressors with momt reports effective results from medications and denies side effects.   Denies unmanaged symptoms of depression, anxiety, or ADHD. Denies SI/HI/AVH.  Will continue medications.      Psychotherapy:  Target symptoms: distractability, anxiety , work stress  Why chosen therapy is appropriate versus another modality: relevant to diagnosis  Outcome monitoring methods: self-report  Therapeutic intervention type: behavior modifying psychotherapy  Topics discussed/themes: building skills sets for symptom management, symptom recognition  The patient's response to the intervention is accepting. The patient's progress toward treatment goals is good.   Duration of intervention: 11 minutes.    Review of Systems   PSYCHIATRIC: Pertinant items are noted in the narrative.  CONSTITUTIONAL: No weight gain or loss.   MUSCULOSKELETAL: No pain or stiffness of the joints.  NEUROLOGIC: No weakness, sensory changes, seizures, confusion, memory loss, tremor or other abnormal movements.  ENDOCRINE: No polydipsia or polyuria.  INTEGUMENTARY: No rashes or lacerations.  EYES: No exophthalmos, jaundice or blindness.  ENT: No dizziness, tinnitus or hearing loss.  RESPIRATORY: No shortness of breath.  CARDIOVASCULAR: No tachycardia or chest pain.  GASTROINTESTINAL: No nausea, vomiting, pain,  constipation or diarrhea.  GENITOURINARY: No frequency, dysuria or sexual dysfunction.  HEMATOLOGIC/LYMPHATIC: No excessive bleeding, prolonged or excessive bleeding after dental extraction/injury.  ALLERGIC/IMMUNOLOGIC: No allergic response to materials, foods or animals at this time.    Past Medical, Family and Social History: The patient's past medical, family and social history have been reviewed and updated as appropriate within the electronic medical record - see encounter notes.    Compliance: yes    Side effects: None    Risk Parameters:  Patient reports no suicidal ideation  Patient reports no homicidal ideation  Patient reports no self-injurious behavior  Patient reports no violent behavior    Exam (detailed: at least 9 elements; comprehensive: all 15 elements)   Constitutional  Vitals:  Most recent vital signs, dated greater than 90 days prior to this appointment, were reviewed.   Vitals:    07/03/23 1037   BP: 136/72   Pulse: 80   Weight: 106.4 kg (234 lb 10.9 oz)        General:  unremarkable, age appropriate     Musculoskeletal  Muscle Strength/Tone:  no tremor, no tic   Gait & Station:  non-ataxic     Psychiatric  Speech:  no latency; no press   Mood & Affect:  euthymic  congruent and appropriate   Thought Process:  normal and logical   Associations:  intact   Thought Content:  normal, no suicidality, no homicidality, delusions, or paranoia   Insight:  intact   Judgement: behavior is adequate to circumstances   Orientation:  grossly intact   Memory: intact for content of interview   Language: grossly intact   Attention Span & Concentration:  able to focus   Fund of Knowledge:  intact and appropriate to age and level of education     Assessment and Diagnosis   Status/Progress: Based on the examination today, the patient's problem(s) is/are improved and adequately but not ideally controlled.  New problems have not been presented today.   Co-morbidities and Lack of compliance are not complicating  management of the primary condition.  There are no active rule-out diagnoses for this patient at this time.     General Impression:       ICD-10-CM ICD-9-CM   1. ADHD (attention deficit hyperactivity disorder), inattentive type  F90.0 314.00   2. Anxiety  F41.9 300.00   3. Generalized anxiety disorder  F41.1 300.02       Intervention/Counseling/Treatment Plan   Medication Management: The risks and benefits of medication were discussed with the patient.  Switch to IR Adderall XR 30 mg 1/2 tablet twice daily(3-months Rx printed)  Continue Celexa 20 mg  daily  Continue Xanax 0.25 mg 2 x daily as needed    Return to Clinic: 3 months    Risks, benefits, side effects and alternative treatments discussed with patient. Patient agrees with the current plan as documented.  Encouraged Patient to keep future appointments.  Take medications as prescribed and abstain from substance abuse.  Pt to present to ED for thoughts to harm herself or others

## 2023-07-03 NOTE — PATIENT INSTRUCTIONS
Switch to IR Adderall XR 30 mg 1/2 tablet twice daily(3-months Rx printed)  Continue Celexa 20 mg  daily  Continue Xanax 0.25 mg 2 x daily as needed

## 2023-11-14 ENCOUNTER — OFFICE VISIT (OUTPATIENT)
Dept: PRIMARY CARE CLINIC | Facility: CLINIC | Age: 43
End: 2023-11-14
Payer: COMMERCIAL

## 2023-11-14 ENCOUNTER — HOSPITAL ENCOUNTER (OUTPATIENT)
Dept: RADIOLOGY | Facility: HOSPITAL | Age: 43
Discharge: HOME | End: 2023-11-14
Attending: STUDENT IN AN ORGANIZED HEALTH CARE EDUCATION/TRAINING PROGRAM
Payer: COMMERCIAL

## 2023-11-14 VITALS
SYSTOLIC BLOOD PRESSURE: 125 MMHG | DIASTOLIC BLOOD PRESSURE: 78 MMHG | BODY MASS INDEX: 33.74 KG/M2 | OXYGEN SATURATION: 99 % | WEIGHT: 235.69 LBS | HEIGHT: 70 IN | HEART RATE: 84 BPM

## 2023-11-14 DIAGNOSIS — J32.9 SINUSITIS, UNSPECIFIED CHRONICITY, UNSPECIFIED LOCATION: ICD-10-CM

## 2023-11-14 DIAGNOSIS — F41.9 ANXIETY: ICD-10-CM

## 2023-11-14 DIAGNOSIS — D50.9 IRON DEFICIENCY ANEMIA, UNSPECIFIED IRON DEFICIENCY ANEMIA TYPE: ICD-10-CM

## 2023-11-14 DIAGNOSIS — M25.552 PAIN OF LEFT HIP: ICD-10-CM

## 2023-11-14 DIAGNOSIS — Z00.00 ANNUAL PHYSICAL EXAM: Primary | ICD-10-CM

## 2023-11-14 DIAGNOSIS — F90.0 ADHD (ATTENTION DEFICIT HYPERACTIVITY DISORDER), INATTENTIVE TYPE: ICD-10-CM

## 2023-11-14 PROCEDURE — 3008F PR BODY MASS INDEX (BMI) DOCUMENTED: ICD-10-PCS | Mod: CPTII,S$GLB,, | Performed by: STUDENT IN AN ORGANIZED HEALTH CARE EDUCATION/TRAINING PROGRAM

## 2023-11-14 PROCEDURE — 99396 PR PREVENTIVE VISIT,EST,40-64: ICD-10-PCS | Mod: S$GLB,,, | Performed by: STUDENT IN AN ORGANIZED HEALTH CARE EDUCATION/TRAINING PROGRAM

## 2023-11-14 PROCEDURE — 99999 PR PBB SHADOW E&M-EST. PATIENT-LVL III: CPT | Mod: PBBFAC,,, | Performed by: STUDENT IN AN ORGANIZED HEALTH CARE EDUCATION/TRAINING PROGRAM

## 2023-11-14 PROCEDURE — 3074F PR MOST RECENT SYSTOLIC BLOOD PRESSURE < 130 MM HG: ICD-10-PCS | Mod: CPTII,S$GLB,, | Performed by: STUDENT IN AN ORGANIZED HEALTH CARE EDUCATION/TRAINING PROGRAM

## 2023-11-14 PROCEDURE — 1159F MED LIST DOCD IN RCRD: CPT | Mod: CPTII,S$GLB,, | Performed by: STUDENT IN AN ORGANIZED HEALTH CARE EDUCATION/TRAINING PROGRAM

## 2023-11-14 PROCEDURE — 1159F PR MEDICATION LIST DOCUMENTED IN MEDICAL RECORD: ICD-10-PCS | Mod: CPTII,S$GLB,, | Performed by: STUDENT IN AN ORGANIZED HEALTH CARE EDUCATION/TRAINING PROGRAM

## 2023-11-14 PROCEDURE — 73521 X-RAY EXAM HIPS BI 2 VIEWS: CPT | Mod: TC,PN

## 2023-11-14 PROCEDURE — 1160F PR REVIEW ALL MEDS BY PRESCRIBER/CLIN PHARMACIST DOCUMENTED: ICD-10-PCS | Mod: CPTII,S$GLB,, | Performed by: STUDENT IN AN ORGANIZED HEALTH CARE EDUCATION/TRAINING PROGRAM

## 2023-11-14 PROCEDURE — 3008F BODY MASS INDEX DOCD: CPT | Mod: CPTII,S$GLB,, | Performed by: STUDENT IN AN ORGANIZED HEALTH CARE EDUCATION/TRAINING PROGRAM

## 2023-11-14 PROCEDURE — 3078F PR MOST RECENT DIASTOLIC BLOOD PRESSURE < 80 MM HG: ICD-10-PCS | Mod: CPTII,S$GLB,, | Performed by: STUDENT IN AN ORGANIZED HEALTH CARE EDUCATION/TRAINING PROGRAM

## 2023-11-14 PROCEDURE — 99396 PREV VISIT EST AGE 40-64: CPT | Mod: S$GLB,,, | Performed by: STUDENT IN AN ORGANIZED HEALTH CARE EDUCATION/TRAINING PROGRAM

## 2023-11-14 PROCEDURE — 3074F SYST BP LT 130 MM HG: CPT | Mod: CPTII,S$GLB,, | Performed by: STUDENT IN AN ORGANIZED HEALTH CARE EDUCATION/TRAINING PROGRAM

## 2023-11-14 PROCEDURE — 1160F RVW MEDS BY RX/DR IN RCRD: CPT | Mod: CPTII,S$GLB,, | Performed by: STUDENT IN AN ORGANIZED HEALTH CARE EDUCATION/TRAINING PROGRAM

## 2023-11-14 PROCEDURE — 73521 XR HIPS BILATERAL 2 VIEW INCL AP PELVIS: ICD-10-PCS | Mod: 26,,, | Performed by: RADIOLOGY

## 2023-11-14 PROCEDURE — 73521 X-RAY EXAM HIPS BI 2 VIEWS: CPT | Mod: 26,,, | Performed by: RADIOLOGY

## 2023-11-14 PROCEDURE — 3078F DIAST BP <80 MM HG: CPT | Mod: CPTII,S$GLB,, | Performed by: STUDENT IN AN ORGANIZED HEALTH CARE EDUCATION/TRAINING PROGRAM

## 2023-11-14 PROCEDURE — 99999 PR PBB SHADOW E&M-EST. PATIENT-LVL III: ICD-10-PCS | Mod: PBBFAC,,, | Performed by: STUDENT IN AN ORGANIZED HEALTH CARE EDUCATION/TRAINING PROGRAM

## 2023-11-14 RX ORDER — AMOXICILLIN AND CLAVULANATE POTASSIUM 875; 125 MG/1; MG/1
1 TABLET, FILM COATED ORAL EVERY 12 HOURS
Qty: 14 TABLET | Refills: 0 | Status: SHIPPED | OUTPATIENT
Start: 2023-11-14 | End: 2023-11-21

## 2023-11-14 NOTE — PROGRESS NOTES
Office visit  Patient: Ruma Guillen   11/14/2023     Assessment:     1. Annual physical exam    2. Pain of left hip    3. Sinusitis, unspecified chronicity, unspecified location    4. Anxiety    5. ADHD (attention deficit hyperactivity disorder), inattentive type    6. Iron deficiency anemia, unspecified iron deficiency anemia type      Plan:       1. Annual physical exam  -     TSH; Future; Expected date: 11/14/2023  -     Hemoglobin A1C; Future; Expected date: 11/14/2023  -     Lipid Panel; Future; Expected date: 11/14/2023  -     Comprehensive Metabolic Panel; Future; Expected date: 11/14/2023  -     CBC Auto Differential; Future; Expected date: 11/14/2023        -Discussed healthy habits and recommended preventative screening    2. Pain of left hip  -     X-Ray Hips Bilateral 2 View Incl AP Pelvis; Future; Expected date: 11/14/2023        -referral to PT offered; patient would prefer to hold off at this time    3. Sinusitis, unspecified chronicity, unspecified location       -given duration of symptoms past 10 days, we will treat with short course of Augmentin    4. Anxiety       -controlled, managed by Psychiatry    5. ADHD (attention deficit hyperactivity disorder), inattentive type       -controlled, managed by Psychiatry    6. Iron deficiency anemia, unspecified iron deficiency anemia type  -     IRON AND TIBC; Future; Expected date: 11/14/2023  -     FERRITIN; Future; Expected date: 11/14/2023        -currently not taking iron, we will recheck iron levels    Other orders  -     amoxicillin-clavulanate 875-125mg (AUGMENTIN) 875-125 mg per tablet; Take 1 tablet by mouth every 12 (twelve) hours. for 7 days  Dispense: 14 tablet; Refill: 0        CHIEF COMPLAINT: check up    HPI: Ruma Guillen is a 43 y.o. female who presents for an annual physical.  She reports hip pain in her groin, left greater than right. Also lower back pain.  Has difficulty sleeping due to pain. Has been using Aleve and a heating pad, also  "using icy hot. Has had hip pain for years. 2-3 weeks ago it became constant. Also has plantar fasciitis and a heel spur.  Was sick about 3 weeks ago. Green/yellow nasal discharge.  History of sinus issues, takes Zyrtec.  3 weeks ago, had cough and fever of 102.4 F.  Took Mucinex and Nyquil.  Feels like her nasal passages are stuffy. Used to get sinus infections and bronchitis.    Review of Systems   Constitutional:  Negative for chills, fever and malaise/fatigue.   HENT:  Positive for congestion and sinus pain. Negative for ear pain and sore throat.    Eyes:  Negative for blurred vision and double vision.   Respiratory:  Negative for cough, shortness of breath and wheezing.    Cardiovascular:  Negative for chest pain, palpitations and leg swelling.   Gastrointestinal:  Negative for constipation, diarrhea, nausea and vomiting.   Genitourinary:  Negative for dysuria and hematuria.   Musculoskeletal:  Positive for back pain and joint pain.   Skin:  Negative for rash.   Neurological:  Negative for dizziness, seizures and headaches.         Current Outpatient Medications   Medication Instructions    ALPRAZolam (XANAX) 0.25 mg, Oral, 2 times daily PRN    amoxicillin-clavulanate 875-125mg (AUGMENTIN) 875-125 mg per tablet 1 tablet, Oral, Every 12 hours    aspirin-acetaminophen-caffeine 250-250-65 mg (EXCEDRIN MIGRAINE) 250-250-65 mg per tablet 1 tablet, Oral, Every 6 hours PRN    cetirizine (ZYRTEC) 10 mg, Oral, Daily    citalopram (CELEXA) 20 mg, Oral, Daily    dextroamphetamine-amphetamine 30 mg Tab Take 0.5 tablet by mouth twice daily    dextroamphetamine-amphetamine 30 mg Tab Take 0.5 tablet by mouth twice daily    dextroamphetamine-amphetamine 30 mg Tab Take 0.5 tablet by mouth twice daily       Lab Results   Component Value Date    HGBA1C 5.0 11/08/2021    HGBA1C 5.0 06/01/2020    HGBA1C 4.9 02/22/2019     No results found for: "MICALBCREAT"  Lab Results   Component Value Date    LDLCALC 151.0 11/08/2021    LDLCALC " "157.6 06/01/2020    CHOL 215 (H) 11/08/2021    HDL 48 11/08/2021    TRIG 80 11/08/2021       Lab Results   Component Value Date     11/08/2021    K 4.2 11/08/2021     11/08/2021    CO2 27 11/08/2021    GLU 86 11/08/2021    BUN 12 11/08/2021    CREATININE 0.7 11/08/2021    CALCIUM 9.7 11/08/2021    PROT 7.6 11/08/2021    ALBUMIN 4.2 11/08/2021    BILITOT 0.5 11/08/2021    ALKPHOS 67 11/08/2021    AST 13 11/08/2021    ALT 15 11/08/2021    ANIONGAP 8 11/08/2021    ESTGFRAFRICA >60.0 11/08/2021    EGFRNONAA >60.0 11/08/2021    WBC 6.27 11/08/2021    HGB 13.5 11/08/2021    HGB 14.3 06/01/2020    HCT 42.0 11/08/2021    MCV 90 11/08/2021     11/08/2021    TSH 1.226 11/08/2021    HEPCAB Negative 09/21/2015       Lab Results   Component Value Date    DGTZTKKL93VC 30 02/22/2019    UTIVBBKD68 325 06/28/2016    FERRITIN 35 11/08/2021    IRON 94 11/08/2021    TRANSFERRIN 292 11/08/2021    TIBC 432 11/08/2021    FESATURATED 22 11/08/2021         Past Medical History:   Diagnosis Date    Anxiety     Headache, migraine     Hx of psychiatric care     KASSIDY (iron deficiency anemia)     Psychiatric problem     Seizures     childhood seizures - last seizure >28 yrs.    Sleep difficulties     Therapy      Past Surgical History:   Procedure Laterality Date    APPENDECTOMY  03/2020    WISDOM TOOTH EXTRACTION       Vitals:    11/14/23 0907   BP: 125/78   Pulse: 84   SpO2: 99%   Weight: 106.9 kg (235 lb 10.8 oz)   Height: 5' 10" (1.778 m)   PainSc:   2   PainLoc: Back     Objective:   Physical Exam  Vitals reviewed.   Constitutional:       General: She is not in acute distress.     Appearance: She is well-developed.   HENT:      Head: Normocephalic and atraumatic.      Right Ear: Tympanic membrane and ear canal normal.      Left Ear: Tympanic membrane and ear canal normal.      Mouth/Throat:      Mouth: Mucous membranes are moist.      Pharynx: No oropharyngeal exudate or posterior oropharyngeal erythema.   Eyes:      " Conjunctiva/sclera: Conjunctivae normal.   Neck:      Thyroid: No thyromegaly.   Cardiovascular:      Rate and Rhythm: Normal rate and regular rhythm.      Heart sounds: Normal heart sounds. No murmur heard.     No friction rub. No gallop.   Pulmonary:      Effort: Pulmonary effort is normal. No respiratory distress.      Breath sounds: Normal breath sounds. No wheezing, rhonchi or rales.   Musculoskeletal:         General: Normal range of motion.      Cervical back: Normal range of motion.      Right lower leg: No edema.      Left lower leg: No edema.   Lymphadenopathy:      Cervical: No cervical adenopathy.   Skin:     General: Skin is warm and dry.   Neurological:      Mental Status: She is alert and oriented to person, place, and time.   Psychiatric:         Mood and Affect: Mood normal.         Behavior: Behavior normal.             Peace Dangelo MD  Internal Medicine and Pediatrics

## 2023-11-30 ENCOUNTER — OFFICE VISIT (OUTPATIENT)
Dept: PSYCHIATRY | Facility: CLINIC | Age: 43
End: 2023-11-30
Payer: COMMERCIAL

## 2023-11-30 VITALS
BODY MASS INDEX: 34.46 KG/M2 | WEIGHT: 240.19 LBS | SYSTOLIC BLOOD PRESSURE: 118 MMHG | HEART RATE: 93 BPM | DIASTOLIC BLOOD PRESSURE: 69 MMHG

## 2023-11-30 DIAGNOSIS — F41.9 ANXIETY: ICD-10-CM

## 2023-11-30 DIAGNOSIS — F90.0 ADHD (ATTENTION DEFICIT HYPERACTIVITY DISORDER), INATTENTIVE TYPE: ICD-10-CM

## 2023-11-30 PROCEDURE — 99214 OFFICE O/P EST MOD 30 MIN: CPT | Mod: S$GLB,,, | Performed by: NURSE PRACTITIONER

## 2023-11-30 PROCEDURE — 3044F PR MOST RECENT HEMOGLOBIN A1C LEVEL <7.0%: ICD-10-PCS | Mod: CPTII,S$GLB,, | Performed by: NURSE PRACTITIONER

## 2023-11-30 PROCEDURE — 3078F DIAST BP <80 MM HG: CPT | Mod: CPTII,S$GLB,, | Performed by: NURSE PRACTITIONER

## 2023-11-30 PROCEDURE — 3078F PR MOST RECENT DIASTOLIC BLOOD PRESSURE < 80 MM HG: ICD-10-PCS | Mod: CPTII,S$GLB,, | Performed by: NURSE PRACTITIONER

## 2023-11-30 PROCEDURE — 3044F HG A1C LEVEL LT 7.0%: CPT | Mod: CPTII,S$GLB,, | Performed by: NURSE PRACTITIONER

## 2023-11-30 PROCEDURE — 3008F BODY MASS INDEX DOCD: CPT | Mod: CPTII,S$GLB,, | Performed by: NURSE PRACTITIONER

## 2023-11-30 PROCEDURE — 3008F PR BODY MASS INDEX (BMI) DOCUMENTED: ICD-10-PCS | Mod: CPTII,S$GLB,, | Performed by: NURSE PRACTITIONER

## 2023-11-30 PROCEDURE — 99999 PR PBB SHADOW E&M-EST. PATIENT-LVL III: CPT | Mod: PBBFAC,,, | Performed by: NURSE PRACTITIONER

## 2023-11-30 PROCEDURE — 99214 PR OFFICE/OUTPT VISIT, EST, LEVL IV, 30-39 MIN: ICD-10-PCS | Mod: S$GLB,,, | Performed by: NURSE PRACTITIONER

## 2023-11-30 PROCEDURE — 99999 PR PBB SHADOW E&M-EST. PATIENT-LVL III: ICD-10-PCS | Mod: PBBFAC,,, | Performed by: NURSE PRACTITIONER

## 2023-11-30 PROCEDURE — 3074F PR MOST RECENT SYSTOLIC BLOOD PRESSURE < 130 MM HG: ICD-10-PCS | Mod: CPTII,S$GLB,, | Performed by: NURSE PRACTITIONER

## 2023-11-30 PROCEDURE — 3074F SYST BP LT 130 MM HG: CPT | Mod: CPTII,S$GLB,, | Performed by: NURSE PRACTITIONER

## 2023-11-30 RX ORDER — ALPRAZOLAM 0.25 MG/1
0.25 TABLET ORAL 2 TIMES DAILY PRN
Qty: 30 TABLET | Refills: 5 | Status: SHIPPED | OUTPATIENT
Start: 2023-11-30

## 2023-11-30 RX ORDER — DEXTROAMPHETAMINE SACCHARATE, AMPHETAMINE ASPARTATE, DEXTROAMPHETAMINE SULFATE AND AMPHETAMINE SULFATE 7.5; 7.5; 7.5; 7.5 MG/1; MG/1; MG/1; MG/1
TABLET ORAL
Qty: 30 TABLET | Refills: 0 | Status: SHIPPED | OUTPATIENT
Start: 2023-12-29

## 2023-11-30 RX ORDER — DEXTROAMPHETAMINE SACCHARATE, AMPHETAMINE ASPARTATE, DEXTROAMPHETAMINE SULFATE AND AMPHETAMINE SULFATE 7.5; 7.5; 7.5; 7.5 MG/1; MG/1; MG/1; MG/1
TABLET ORAL
Qty: 30 TABLET | Refills: 0 | Status: SHIPPED | OUTPATIENT
Start: 2024-01-28

## 2023-11-30 RX ORDER — CITALOPRAM 20 MG/1
20 TABLET, FILM COATED ORAL DAILY
Qty: 90 TABLET | Refills: 3 | Status: SHIPPED | OUTPATIENT
Start: 2023-11-30

## 2023-11-30 RX ORDER — DEXTROAMPHETAMINE SACCHARATE, AMPHETAMINE ASPARTATE, DEXTROAMPHETAMINE SULFATE AND AMPHETAMINE SULFATE 7.5; 7.5; 7.5; 7.5 MG/1; MG/1; MG/1; MG/1
TABLET ORAL
Qty: 30 TABLET | Refills: 0 | Status: SHIPPED | OUTPATIENT
Start: 2023-11-30

## 2023-11-30 NOTE — PROGRESS NOTES
Outpatient Psychiatry Follow-Up Visit (MD/NP)    11/30/2023    Clinical Status of Patient:  Outpatient (Ambulatory)    Chief Complaint:  Ruma Guillen is a 43 y.o. female who presents today for follow-up of anxiety and attention problems.  Met with patient.      Last visit was:3/15/23. Chart and  reviewed    Interval History and Content of Current Session:  Current Psychiatric Medications/changes  Switch to IR Adderall XR 30 mg 1/2 tablet twice daily(3-months Rx printed)  Continue Celexa 20 mg  daily  Continue Xanax 0.25 mg 2 x daily as needed    Pt presents bright affect and euthymic mood. Doing well and plans to go to [place] for vacation Pt reports effective results from medications and denies side effects.   Denies unmanaged symptoms of depression, anxiety, or ADHD. Denies SI/HI/AVH.  Will continue medications.      Psychotherapy:  Target symptoms: distractability, anxiety , work stress  Why chosen therapy is appropriate versus another modality: relevant to diagnosis  Outcome monitoring methods: self-report  Therapeutic intervention type: behavior modifying psychotherapy  Topics discussed/themes: building skills sets for symptom management, symptom recognition  The patient's response to the intervention is accepting. The patient's progress toward treatment goals is good.   Duration of intervention: 11 minutes.    Review of Systems   PSYCHIATRIC: Pertinant items are noted in the narrative.  CONSTITUTIONAL: No weight gain or loss.   MUSCULOSKELETAL: No pain or stiffness of the joints.  NEUROLOGIC: No weakness, sensory changes, seizures, confusion, memory loss, tremor or other abnormal movements.  ENDOCRINE: No polydipsia or polyuria.  INTEGUMENTARY: No rashes or lacerations.  EYES: No exophthalmos, jaundice or blindness.  ENT: No dizziness, tinnitus or hearing loss.  RESPIRATORY: No shortness of breath.  CARDIOVASCULAR: No tachycardia or chest pain.  GASTROINTESTINAL: No nausea, vomiting, pain, constipation or  diarrhea.  GENITOURINARY: No frequency, dysuria or sexual dysfunction.  HEMATOLOGIC/LYMPHATIC: No excessive bleeding, prolonged or excessive bleeding after dental extraction/injury.  ALLERGIC/IMMUNOLOGIC: No allergic response to materials, foods or animals at this time.    Past Medical, Family and Social History: The patient's past medical, family and social history have been reviewed and updated as appropriate within the electronic medical record - see encounter notes.    Compliance: yes    Side effects: None    Risk Parameters:  Patient reports no suicidal ideation  Patient reports no homicidal ideation  Patient reports no self-injurious behavior  Patient reports no violent behavior    Exam (detailed: at least 9 elements; comprehensive: all 15 elements)   Constitutional  Vitals:  Most recent vital signs, dated greater than 90 days prior to this appointment, were reviewed.   Vitals:    11/30/23 0937   BP: 118/69   Pulse: 93   Weight: 109 kg (240 lb 3.1 oz)      General:  unremarkable, age appropriate     Musculoskeletal  Muscle Strength/Tone:  no tremor, no tic   Gait & Station:  non-ataxic     Psychiatric  Speech:  no latency; no press   Mood & Affect:  euthymic  congruent and appropriate   Thought Process:  normal and logical   Associations:  intact   Thought Content:  normal, no suicidality, no homicidality, delusions, or paranoia   Insight:  intact   Judgement: behavior is adequate to circumstances   Orientation:  grossly intact   Memory: intact for content of interview   Language: grossly intact   Attention Span & Concentration:  able to focus   Fund of Knowledge:  intact and appropriate to age and level of education     Assessment and Diagnosis   Status/Progress: Based on the examination today, the patient's problem(s) is/are improved and adequately but not ideally controlled.  New problems have not been presented today.   Co-morbidities and Lack of compliance are not complicating management of the primary  condition.  There are no active rule-out diagnoses for this patient at this time.     General Impression:       ICD-10-CM ICD-9-CM   1. ADHD (attention deficit hyperactivity disorder), inattentive type  F90.0 314.00   2. Anxiety  F41.9 300.00       Intervention/Counseling/Treatment Plan   Medication Management: The risks and benefits of medication were discussed with the patient.  Switch to IR Adderall XR 30 mg 1/2 tablet twice daily(3-months Rx printed)  Continue Celexa 20 mg  daily  Continue Xanax 0.25 mg 2 x daily as needed    Return to Clinic: 3 months    Risks, benefits, side effects and alternative treatments discussed with patient. Patient agrees with the current plan as documented.  Encouraged Patient to keep future appointments.  Take medications as prescribed and abstain from substance abuse.  Pt to present to ED for thoughts to harm herself or others

## 2023-12-13 ENCOUNTER — PATIENT MESSAGE (OUTPATIENT)
Dept: PRIMARY CARE CLINIC | Facility: CLINIC | Age: 43
End: 2023-12-13
Payer: COMMERCIAL

## 2023-12-13 NOTE — TELEPHONE ENCOUNTER
PT tested positive for Covid on today and wanted medicine sent over to her pharmacy. I explained to patient that Dr. Dangelo could not send any medicine to her pharmacy but did give pt options to try to help solve her symptoms.

## 2024-02-26 ENCOUNTER — HOSPITAL ENCOUNTER (OUTPATIENT)
Dept: RADIOLOGY | Facility: HOSPITAL | Age: 44
Discharge: HOME | End: 2024-02-26
Attending: STUDENT IN AN ORGANIZED HEALTH CARE EDUCATION/TRAINING PROGRAM
Payer: COMMERCIAL

## 2024-02-26 VITALS — WEIGHT: 240 LBS | HEIGHT: 70 IN | BODY MASS INDEX: 34.36 KG/M2

## 2024-02-26 DIAGNOSIS — Z12.31 ENCOUNTER FOR SCREENING MAMMOGRAM FOR BREAST CANCER: ICD-10-CM

## 2024-02-26 PROCEDURE — 77067 SCR MAMMO BI INCL CAD: CPT | Mod: TC

## 2024-02-26 PROCEDURE — 77063 BREAST TOMOSYNTHESIS BI: CPT | Mod: 26,,, | Performed by: RADIOLOGY

## 2024-02-26 PROCEDURE — 77067 SCR MAMMO BI INCL CAD: CPT | Mod: 26,,, | Performed by: RADIOLOGY

## 2024-04-04 ENCOUNTER — OFFICE VISIT (OUTPATIENT)
Dept: PODIATRY | Facility: CLINIC | Age: 44
End: 2024-04-04
Payer: COMMERCIAL

## 2024-04-04 VITALS
DIASTOLIC BLOOD PRESSURE: 81 MMHG | BODY MASS INDEX: 35.09 KG/M2 | HEIGHT: 70 IN | WEIGHT: 245.13 LBS | HEART RATE: 74 BPM | SYSTOLIC BLOOD PRESSURE: 122 MMHG

## 2024-04-04 DIAGNOSIS — M67.471 GANGLION CYST OF RIGHT FOOT: Primary | ICD-10-CM

## 2024-04-04 DIAGNOSIS — M72.2 PLANTAR FASCIITIS: ICD-10-CM

## 2024-04-04 PROCEDURE — 99214 OFFICE O/P EST MOD 30 MIN: CPT | Mod: S$GLB,,, | Performed by: PODIATRIST

## 2024-04-04 PROCEDURE — 3079F DIAST BP 80-89 MM HG: CPT | Mod: CPTII,S$GLB,, | Performed by: PODIATRIST

## 2024-04-04 PROCEDURE — 3008F BODY MASS INDEX DOCD: CPT | Mod: CPTII,S$GLB,, | Performed by: PODIATRIST

## 2024-04-04 PROCEDURE — 1159F MED LIST DOCD IN RCRD: CPT | Mod: CPTII,S$GLB,, | Performed by: PODIATRIST

## 2024-04-04 PROCEDURE — 99999 PR PBB SHADOW E&M-EST. PATIENT-LVL III: CPT | Mod: PBBFAC,,, | Performed by: PODIATRIST

## 2024-04-04 PROCEDURE — 3074F SYST BP LT 130 MM HG: CPT | Mod: CPTII,S$GLB,, | Performed by: PODIATRIST

## 2024-04-04 PROCEDURE — 1160F RVW MEDS BY RX/DR IN RCRD: CPT | Mod: CPTII,S$GLB,, | Performed by: PODIATRIST

## 2024-04-04 NOTE — PROGRESS NOTES
Subjective:      Patient ID: Ruma Guillen is a 43 y.o. female.    Chief Complaint:   Foot Problem (Right foot(lump on top)/PCP- 11/14/2023/Peace Dangelo MD)    Ruma is a 43 y.o. female who rtc new foot complaint.  + bump on foot... no pain. Occasional discomfort.   No injury  No new shoegear    + hx gang cyst wrist    Last seen 2021. P[lantar fascia ... Stretching. Pain now and then   Past Medical History:   Diagnosis Date    Anxiety     Headache, migraine     Hx of psychiatric care     KASSIDY (iron deficiency anemia)     Psychiatric problem     Seizures     childhood seizures - last seizure >30 yrs.    Sleep difficulties     Therapy      Past Surgical History:   Procedure Laterality Date    APPENDECTOMY  03/2020    WISDOM TOOTH EXTRACTION       Current Outpatient Medications on File Prior to Visit   Medication Sig Dispense Refill    ALPRAZolam (XANAX) 0.25 MG tablet Take 1 tablet (0.25 mg total) by mouth 2 (two) times daily as needed for Anxiety. 30 tablet 5    aspirin-acetaminophen-caffeine 250-250-65 mg (EXCEDRIN MIGRAINE) 250-250-65 mg per tablet Take 1 tablet by mouth every 6 (six) hours as needed for Pain.      cetirizine (ZYRTEC) 10 MG tablet Take 10 mg by mouth once daily.      citalopram (CELEXA) 20 MG tablet Take 1 tablet (20 mg total) by mouth once daily. 90 tablet 3    dextroamphetamine-amphetamine 30 mg Tab Take 0.5 tablet by mouth twice daily 30 tablet 0    dextroamphetamine-amphetamine 30 mg Tab Take 0.5 tablet by mouth twice daily 30 tablet 0    dextroamphetamine-amphetamine 30 mg Tab Take 0.5 tablet by mouth twice daily 30 tablet 0     No current facility-administered medications on file prior to visit.     Review of patient's allergies indicates:  No Known Allergies    Review of Systems   Constitutional: Negative for chills, decreased appetite, fever, malaise/fatigue, night sweats, weight gain and weight loss.   Cardiovascular:  Negative for chest pain, claudication, dyspnea on exertion, leg  "swelling, palpitations and syncope.   Respiratory:  Negative for cough and shortness of breath.    Endocrine: Negative for cold intolerance and heat intolerance.   Hematologic/Lymphatic: Negative for bleeding problem. Does not bruise/bleed easily.   Skin:  Negative for color change, dry skin, flushing, itching, nail changes, poor wound healing, rash, skin cancer, suspicious lesions and unusual hair distribution.   Musculoskeletal:  Negative for arthritis, back pain, falls, gout, joint pain, joint swelling, muscle cramps, muscle weakness, myalgias, neck pain and stiffness.        Foot pain    Bump right foot    Gastrointestinal:  Negative for diarrhea, nausea and vomiting.   Neurological:  Negative for dizziness, focal weakness, light-headedness, numbness, paresthesias, tremors, vertigo and weakness.   Psychiatric/Behavioral:  Negative for altered mental status and depression. The patient does not have insomnia.    Allergic/Immunologic: Negative.            Objective:       Vitals:    04/04/24 1029   BP: 122/81   Pulse: 74   Weight: 111.2 kg (245 lb 2.4 oz)   Height: 5' 10" (1.778 m)   PainSc: 0-No pain   111.2 kg (245 lb 2.4 oz)     Physical Exam  Vitals reviewed.   Constitutional:       General: She is not in acute distress.     Appearance: She is well-developed. She is not ill-appearing, toxic-appearing or diaphoretic.      Comments: Proper supportive shoegear with over-the-counter inserts      Cardiovascular:      Pulses:           Dorsalis pedis pulses are 2+ on the right side and 2+ on the left side.        Posterior tibial pulses are 2+ on the right side and 2+ on the left side.   Musculoskeletal:      Right lower leg: No edema.      Left lower leg: No edema.      Right ankle: Normal.      Right Achilles Tendon: Normal. No tenderness or defects.      Left ankle: Normal.      Left Achilles Tendon: Normal. No tenderness or defects.      Right foot: Decreased range of motion. Deformity, bunion and prominent " metatarsal heads present. No tenderness or bony tenderness.      Left foot: Decreased range of motion. Bunion, prominent metatarsal heads and tenderness present. No deformity or bony tenderness.      Comments: Right foot metatarsus adductus with the foot inverted in resting position.      No  pain to palpation inferior heel right at medial calcaneal tubercle without ecchymosis, erythema, edema, or cardinal signs infection, and no signs of trauma.    Mild limb length deformity with right longer than left    + soft tissue mass freely movable approx 1cm dorsal to cuboid/4th MT base  No pop to MT or cyst   Feet:      Right foot:      Protective Sensation: 10 sites tested.  10 sites sensed.      Skin integrity: No ulcer, blister, skin breakdown, erythema, warmth, callus or dry skin.      Toenail Condition: Right toenails are normal.      Left foot:      Protective Sensation: 10 sites tested.  10 sites sensed.      Skin integrity: No ulcer, blister, skin breakdown, erythema, warmth, callus or dry skin.      Toenail Condition: Left toenails are normal.      Comments: No open lesions or signs of infection  Skin:     General: Skin is warm and dry.      Capillary Refill: Capillary refill takes 2 to 3 seconds.      Coloration: Skin is not pale.      Findings: No erythema or rash.      Comments: Pinch callus noted sub 2nd metatarsophalangeal joints   Neurological:      Mental Status: She is alert and oriented to person, place, and time.      Sensory: No sensory deficit.      Gait: Gait is intact.   Psychiatric:         Attention and Perception: Attention normal.         Mood and Affect: Mood normal.         Speech: Speech normal.         Behavior: Behavior normal.         Thought Content: Thought content normal.         Cognition and Memory: Cognition normal.         Judgment: Judgment normal.               Assessment:       Encounter Diagnoses   Name Primary?    Ganglion cyst of right foot Yes    Plantar fasciitis             Plan:       Ruma was seen today for foot problem.    Diagnoses and all orders for this visit:    Ganglion cyst of right foot    Plantar fasciitis        I counseled the patient on her conditions, their implications and medical management.    - continue p.fascia stretching/proper shoes    - cyst likely ganglion.  Re-laced tennis shoes   Avoid pressure in area    Dispensed U shaped pads    Consider US or excision. D/w pt aspiration has high reoccurrence rate     Pt will call if wants surgical consult referral            Follow up if symptoms worsen or fail to improve.

## 2024-04-10 ENCOUNTER — OFFICE VISIT (OUTPATIENT)
Dept: PSYCHIATRY | Facility: CLINIC | Age: 44
End: 2024-04-10
Payer: COMMERCIAL

## 2024-04-10 DIAGNOSIS — F90.0 ADHD (ATTENTION DEFICIT HYPERACTIVITY DISORDER), INATTENTIVE TYPE: ICD-10-CM

## 2024-04-10 DIAGNOSIS — F41.9 ANXIETY: ICD-10-CM

## 2024-04-10 PROCEDURE — 99999 PR PBB SHADOW E&M-EST. PATIENT-LVL I: CPT | Mod: PBBFAC,,, | Performed by: NURSE PRACTITIONER

## 2024-04-10 PROCEDURE — 99214 OFFICE O/P EST MOD 30 MIN: CPT | Mod: S$GLB,,, | Performed by: NURSE PRACTITIONER

## 2024-04-10 RX ORDER — DEXTROAMPHETAMINE SACCHARATE, AMPHETAMINE ASPARTATE, DEXTROAMPHETAMINE SULFATE AND AMPHETAMINE SULFATE 7.5; 7.5; 7.5; 7.5 MG/1; MG/1; MG/1; MG/1
TABLET ORAL
Qty: 30 TABLET | Refills: 0 | Status: SHIPPED | OUTPATIENT
Start: 2024-04-10

## 2024-04-10 RX ORDER — ALPRAZOLAM 0.25 MG/1
0.25 TABLET ORAL 2 TIMES DAILY PRN
Qty: 30 TABLET | Refills: 5 | Status: SHIPPED | OUTPATIENT
Start: 2024-04-10

## 2024-04-10 RX ORDER — CITALOPRAM 20 MG/1
20 TABLET, FILM COATED ORAL DAILY
Qty: 90 TABLET | Refills: 3 | Status: SHIPPED | OUTPATIENT
Start: 2024-04-10

## 2024-04-10 RX ORDER — DEXTROAMPHETAMINE SACCHARATE, AMPHETAMINE ASPARTATE, DEXTROAMPHETAMINE SULFATE AND AMPHETAMINE SULFATE 7.5; 7.5; 7.5; 7.5 MG/1; MG/1; MG/1; MG/1
TABLET ORAL
Qty: 30 TABLET | Refills: 0 | Status: SHIPPED | OUTPATIENT
Start: 2024-06-08

## 2024-04-10 RX ORDER — DEXTROAMPHETAMINE SACCHARATE, AMPHETAMINE ASPARTATE, DEXTROAMPHETAMINE SULFATE AND AMPHETAMINE SULFATE 7.5; 7.5; 7.5; 7.5 MG/1; MG/1; MG/1; MG/1
TABLET ORAL
Qty: 30 TABLET | Refills: 0 | Status: SHIPPED | OUTPATIENT
Start: 2024-05-09

## 2024-04-10 NOTE — PROGRESS NOTES
Outpatient Psychiatry Follow-Up Visit (MD/NP)    4/10/2024    Clinical Status of Patient:  Outpatient (Ambulatory)    Chief Complaint:  Ruma Guillen is a 43 y.o. female who presents today for follow-up of anxiety and attention problems.  Met with patient.      Last visit was:3/15/23. Chart and  reviewed    Interval History and Content of Current Session:  Current Psychiatric Medications/changes  Switch to IR Adderall XR 30 mg 1/2 tablet twice daily(3-months Rx printed)  Continue Celexa 20 mg  daily  Continue Xanax 0.25 mg 2 x daily as needed      Pt presents bright affect and euthymic mood. Pt reports effective results from medications and denies side effects.   Denies unmanaged symptoms of depression, anxiety, or ADHD. Denies SI/HI/AVH.  Will continue medications.      Psychotherapy:  Target symptoms: distractability, anxiety , work stress  Why chosen therapy is appropriate versus another modality: relevant to diagnosis  Outcome monitoring methods: self-report  Therapeutic intervention type: behavior modifying psychotherapy  Topics discussed/themes: building skills sets for symptom management, symptom recognition  The patient's response to the intervention is accepting. The patient's progress toward treatment goals is good.   Duration of intervention: 11 minutes.    Review of Systems   PSYCHIATRIC: Pertinant items are noted in the narrative.  CONSTITUTIONAL: No weight gain or loss.   MUSCULOSKELETAL: No pain or stiffness of the joints.  NEUROLOGIC: No weakness, sensory changes, seizures, confusion, memory loss, tremor or other abnormal movements.  ENDOCRINE: No polydipsia or polyuria.  INTEGUMENTARY: No rashes or lacerations.  EYES: No exophthalmos, jaundice or blindness.  ENT: No dizziness, tinnitus or hearing loss.  RESPIRATORY: No shortness of breath.  CARDIOVASCULAR: No tachycardia or chest pain.  GASTROINTESTINAL: No nausea, vomiting, pain, constipation or diarrhea.  GENITOURINARY: No frequency, dysuria or  sexual dysfunction.  HEMATOLOGIC/LYMPHATIC: No excessive bleeding, prolonged or excessive bleeding after dental extraction/injury.  ALLERGIC/IMMUNOLOGIC: No allergic response to materials, foods or animals at this time.    Past Medical, Family and Social History: The patient's past medical, family and social history have been reviewed and updated as appropriate within the electronic medical record - see encounter notes.    Compliance: yes    Side effects: None    Risk Parameters:  Patient reports no suicidal ideation  Patient reports no homicidal ideation  Patient reports no self-injurious behavior  Patient reports no violent behavior    Exam (detailed: at least 9 elements; comprehensive: all 15 elements)   Constitutional  Vitals:  Most recent vital signs, dated greater than 90 days prior to this appointment, were reviewed.   There were no vitals filed for this visit.     General:  unremarkable, age appropriate     Musculoskeletal  Muscle Strength/Tone:  no tremor, no tic   Gait & Station:  non-ataxic     Psychiatric  Speech:  no latency; no press   Mood & Affect:  euthymic  congruent and appropriate   Thought Process:  normal and logical   Associations:  intact   Thought Content:  normal, no suicidality, no homicidality, delusions, or paranoia   Insight:  intact   Judgement: behavior is adequate to circumstances   Orientation:  grossly intact   Memory: intact for content of interview   Language: grossly intact   Attention Span & Concentration:  able to focus   Fund of Knowledge:  intact and appropriate to age and level of education     Assessment and Diagnosis   Status/Progress: Based on the examination today, the patient's problem(s) is/are improved and adequately but not ideally controlled.  New problems have not been presented today.   Co-morbidities and Lack of compliance are not complicating management of the primary condition.  There are no active rule-out diagnoses for this patient at this time.     General  Impression:       ICD-10-CM ICD-9-CM   1. ADHD (attention deficit hyperactivity disorder), inattentive type  F90.0 314.00   2. Anxiety  F41.9 300.00     Intervention/Counseling/Treatment Plan   Medication Management: The risks and benefits of medication were discussed with the patient.  Adderall XR 30 mg 1/2 tablet twice daily(3-months Rx printed)  Continue Celexa 20 mg  daily  Continue Xanax 0.25 mg 2 x daily as needed    Return to Clinic: 3 months    Risks, benefits, side effects and alternative treatments discussed with patient. Patient agrees with the current plan as documented.  Encouraged Patient to keep future appointments.  Take medications as prescribed and abstain from substance abuse.  Pt to present to ED for thoughts to harm herself or others

## 2024-05-28 ENCOUNTER — OFFICE VISIT (OUTPATIENT)
Dept: PODIATRY | Facility: CLINIC | Age: 44
End: 2024-05-28
Payer: COMMERCIAL

## 2024-05-28 VITALS
HEIGHT: 70 IN | WEIGHT: 245.13 LBS | HEART RATE: 81 BPM | DIASTOLIC BLOOD PRESSURE: 80 MMHG | TEMPERATURE: 97 F | BODY MASS INDEX: 35.09 KG/M2 | SYSTOLIC BLOOD PRESSURE: 125 MMHG

## 2024-05-28 DIAGNOSIS — M79.671 CHRONIC PAIN IN RIGHT FOOT: ICD-10-CM

## 2024-05-28 DIAGNOSIS — M67.471 GANGLION CYST OF RIGHT FOOT: Primary | ICD-10-CM

## 2024-05-28 DIAGNOSIS — G89.29 CHRONIC PAIN IN RIGHT FOOT: ICD-10-CM

## 2024-05-28 PROCEDURE — 3008F BODY MASS INDEX DOCD: CPT | Mod: CPTII,S$GLB,, | Performed by: STUDENT IN AN ORGANIZED HEALTH CARE EDUCATION/TRAINING PROGRAM

## 2024-05-28 PROCEDURE — 20612 ASPIRATE/INJ GANGLION CYST: CPT | Mod: S$GLB,,, | Performed by: STUDENT IN AN ORGANIZED HEALTH CARE EDUCATION/TRAINING PROGRAM

## 2024-05-28 PROCEDURE — 3074F SYST BP LT 130 MM HG: CPT | Mod: CPTII,S$GLB,, | Performed by: STUDENT IN AN ORGANIZED HEALTH CARE EDUCATION/TRAINING PROGRAM

## 2024-05-28 PROCEDURE — 3079F DIAST BP 80-89 MM HG: CPT | Mod: CPTII,S$GLB,, | Performed by: STUDENT IN AN ORGANIZED HEALTH CARE EDUCATION/TRAINING PROGRAM

## 2024-05-28 PROCEDURE — 99213 OFFICE O/P EST LOW 20 MIN: CPT | Mod: 25,S$GLB,, | Performed by: STUDENT IN AN ORGANIZED HEALTH CARE EDUCATION/TRAINING PROGRAM

## 2024-05-28 PROCEDURE — 99999 PR PBB SHADOW E&M-EST. PATIENT-LVL III: CPT | Mod: PBBFAC,,, | Performed by: STUDENT IN AN ORGANIZED HEALTH CARE EDUCATION/TRAINING PROGRAM

## 2024-05-28 RX ORDER — DEXAMETHASONE SODIUM PHOSPHATE 4 MG/ML
8 INJECTION, SOLUTION INTRA-ARTICULAR; INTRALESIONAL; INTRAMUSCULAR; INTRAVENOUS; SOFT TISSUE
Status: DISCONTINUED | OUTPATIENT
Start: 2024-05-28 | End: 2024-05-28 | Stop reason: HOSPADM

## 2024-05-28 RX ADMIN — DEXAMETHASONE SODIUM PHOSPHATE 8 MG: 4 INJECTION, SOLUTION INTRA-ARTICULAR; INTRALESIONAL; INTRAMUSCULAR; INTRAVENOUS; SOFT TISSUE at 08:05

## 2024-05-28 NOTE — PROCEDURES
Ganglion Cyst    Date/Time: 5/28/2024 8:15 AM    Performed by: Dejon Peterson DPM  Authorized by: Dejon Peterson DPM    Consent Done?:  Yes (Verbal)  Indications:  Pain  Site marked: the procedure site was marked    Timeout: prior to procedure the correct patient, procedure, and site was verified    Local anesthesia used?: Yes    Anesthesia:  Local infiltration  Local anesthetic:  Lidocaine 1% without epinephrine  Anesthetic total (ml):  2    Location:  Foot  Ultrasonic Guidance for Needle Placement?: No    Needle size:  18 G  Approach:  Dorsal  Medications:  8 mg dexAMETHasone 4 mg/mL  Aspirate amount (ml):  1  Aspirate:  Clear  Patient tolerance:  Patient tolerated the procedure well with no immediate complications  Site:  Right foot

## 2024-05-28 NOTE — PROGRESS NOTES
Subjective:     Patient    Ruma Guillen is a 43 y.o. female.    Problem    Recently seen by Dr Barker for cluster of masses on right foot. She has a history of ganglion cysts on her wrist and foot, responded well in the past to aspiration-injection. The masses do not always hurt but in certain footwear especially they do cause pain. Also with chronic bilateral heel pain which she is managing with stretching, shoes, inserts.     History    History obtained from patient and review of medical records.     Past Medical History:   Diagnosis Date    Anxiety     Headache, migraine     Hx of psychiatric care     KASSIDY (iron deficiency anemia)     Psychiatric problem     Seizures     childhood seizures - last seizure >28 yrs.    Sleep difficulties     Therapy        Past Surgical History:   Procedure Laterality Date    APPENDECTOMY  03/2020    WISDOM TOOTH EXTRACTION          Objective:     Vitals  Wt Readings from Last 1 Encounters:   05/28/24 111.2 kg (245 lb 2.4 oz)     Temp Readings from Last 1 Encounters:   05/28/24 96.8 °F (36 °C) (Oral)     BP Readings from Last 1 Encounters:   05/28/24 125/80     Pulse Readings from Last 1 Encounters:   05/28/24 81       Dermatological Exam    Skin:  Pedal hair growth, skin color, and skin texture normal on right  Pedal hair growth, skin color, and skin texture normal on left    Nails:  All nails normal in length, thickness, color    Vascular Exam    Arteries:  Posterior tibial artery palpable on right  Dorsalis pedis artery palpable on right  Posterior tibial artery palpable on left  Dorsalis pedis artery palpable on left    Veins:  Superficial veins unremarkable on right  Superficial veins unremarkable on left    Swelling:  None on right  None on left    Neurological Exam    [place] touch test:  6/6 sites sensed, light touch intact    Provocation Sign:  + at  right medial calcaneal nerves       Musculoskeletal Exam    Footwear:  Casual on right  Casual on left    Gait Exam:    Ambulatory Status: Ambulatory  Gait: Normal  Assistive Devices: None    Foot Progression Angle:  Normal on right  Normal on left    Right Lower Extremity Additional Findings:  Cluster of at least 2 synovial cysts dorsolateral right midfoot, mildly tender; difficult to discern if they communicate  Right foot and ankle function, strength, and range of motion unremarkable except as noted above.     Left Lower Extremity Additional Findings:  Left foot and ankle function, strength, and range of motion unremarkable except as noted above.    Imaging and Other Tests    Imaging:  Independently reviewed and interpreted imaging, findings are as follows: N/A     Assessment:     Encounter Diagnoses   Name Primary?    Ganglion cyst of right foot Yes    Chronic pain in right foot         Plan:     I counseled the patient on her conditions, their implications and medical management.    Right foot ganglion cysts, pain: chronic stable   -Recommended aspiration-injection of cysts. Reviewed potential risks, benefits, alternatives. Patient amenable. Performed, see procedure note.   -If not resolving then consider surgical excision.     Bilateral foot pain: chronic stable  -Continue supportive footwear that is immediately comfortable.   -Continue supportive inserts that are immediately comfortable.   -Continue stretching regularly.     Return to clinic in 1-2 months for cyst recheck and bilateral heel pain, call sooner PRN.

## 2024-06-28 ENCOUNTER — OFFICE VISIT (OUTPATIENT)
Dept: PODIATRY | Facility: CLINIC | Age: 44
End: 2024-06-28
Payer: COMMERCIAL

## 2024-06-28 VITALS
HEART RATE: 81 BPM | DIASTOLIC BLOOD PRESSURE: 80 MMHG | HEIGHT: 70 IN | BODY MASS INDEX: 35.09 KG/M2 | WEIGHT: 245.13 LBS | SYSTOLIC BLOOD PRESSURE: 125 MMHG | OXYGEN SATURATION: 100 %

## 2024-06-28 DIAGNOSIS — G89.29 CHRONIC PAIN OF BOTH FEET: Primary | ICD-10-CM

## 2024-06-28 DIAGNOSIS — M67.471 GANGLION CYST OF RIGHT FOOT: ICD-10-CM

## 2024-06-28 DIAGNOSIS — M79.672 CHRONIC PAIN OF BOTH FEET: Primary | ICD-10-CM

## 2024-06-28 DIAGNOSIS — M79.671 CHRONIC PAIN OF BOTH FEET: Primary | ICD-10-CM

## 2024-06-28 PROCEDURE — 99999 PR PBB SHADOW E&M-EST. PATIENT-LVL III: CPT | Mod: PBBFAC,,, | Performed by: STUDENT IN AN ORGANIZED HEALTH CARE EDUCATION/TRAINING PROGRAM

## 2024-06-28 RX ORDER — AMITRIPTYLINE HYDROCHLORIDE 25 MG/1
25 TABLET, FILM COATED ORAL NIGHTLY
Qty: 30 TABLET | Refills: 11 | Status: SHIPPED | OUTPATIENT
Start: 2024-06-28 | End: 2025-06-28

## 2024-06-28 NOTE — PROGRESS NOTES
Subjective:     Patient    Ruma Guillen is a 43 y.o. female.    Problem    05/28/24: Recently seen by Dr Barker for cluster of masses on right foot. She has a history of ganglion cysts on her wrist and foot, responded well in the past to aspiration-injection. The masses do not always hurt but in certain footwear especially they do cause pain. Also with chronic bilateral heel pain which she is managing with stretching, shoes, inserts.     06/28/24: Returns for right foot painful cluster of ganglion cysts, s/p aspiration/injection 1 month ago, discussed possible excision. There has been mild improvement in the size of one of the cystic lesions but the other is still similar in size to before the procedure and is pressing on nerves causing radiating nerve symptoms into the toes. She also has chronic bilateral heel pain currently worse on the left than the right; she stretches, wears supportive shoes and inserts. Admits to feet falling asleep sometimes.     History    History obtained from patient and review of medical records.     Past Medical History:   Diagnosis Date    Anxiety     Headache, migraine     Hx of psychiatric care     KASSIDY (iron deficiency anemia)     Psychiatric problem     Seizures     childhood seizures - last seizure >30 yrs.    Sleep difficulties     Therapy        Past Surgical History:   Procedure Laterality Date    APPENDECTOMY  03/2020    WISDOM TOOTH EXTRACTION          Objective:     Vitals  Wt Readings from Last 1 Encounters:   06/28/24 111.2 kg (245 lb 2.4 oz)     Temp Readings from Last 1 Encounters:   05/28/24 96.8 °F (36 °C) (Oral)     BP Readings from Last 1 Encounters:   06/28/24 125/80     Pulse Readings from Last 1 Encounters:   06/28/24 81       Dermatological Exam    Skin:  Pedal hair growth, skin color, and skin texture normal on right  Pedal hair growth, skin color, and skin texture normal on left    Nails:  All nails normal in length, thickness, color    Vascular  Exam    Arteries:  Posterior tibial artery palpable on right  Dorsalis pedis artery palpable on right  Posterior tibial artery palpable on left  Dorsalis pedis artery palpable on left    Veins:  Superficial veins unremarkable on right  Superficial veins unremarkable on left    Swelling:  None on right  None on left    Neurological Exam    [place] touch test:  6/6 sites sensed, light touch intact    Provocation Sign:  + at  bilateral medial calcaneal nerves       Musculoskeletal Exam    Footwear:  Casual on right  Casual on left    Gait Exam:   Ambulatory Status: Ambulatory  Gait: Normal  Assistive Devices: None    Foot Progression Angle:  Normal on right  Normal on left    Right Lower Extremity Additional Findings:  Negative windlass test  Cluster of at least 2 synovial cysts dorsolateral right midfoot, mildly tender with distal cyst causing radiating symptoms into toes  Right foot and ankle function, strength, and range of motion unremarkable except as noted above.     Left Lower Extremity Additional Findings:  Negative windlass test  Left foot and ankle function, strength, and range of motion unremarkable except as noted above.    Imaging and Other Tests    Imaging:  Independently reviewed and interpreted imaging, findings are as follows: N/A     Assessment:     Encounter Diagnoses   Name Primary?    Chronic pain of both feet Yes    Ganglion cyst of right foot           Plan:     I counseled the patient on her conditions, their implications and medical management.    Right foot ganglion cysts, pain: chronic stable   -Will monitor, possible excision in future.      Bilateral foot pain: chronic stable  -Start amitriptyline.   -Continue supportive footwear that is immediately comfortable.   -Continue supportive inserts that are immediately comfortable.   -Continue stretching regularly.       Return to clinic in 1-2 months for nerve pain, call sooner PRN.

## 2024-08-22 ENCOUNTER — OFFICE VISIT (OUTPATIENT)
Dept: PODIATRY | Facility: CLINIC | Age: 44
End: 2024-08-22
Payer: COMMERCIAL

## 2024-08-22 VITALS
SYSTOLIC BLOOD PRESSURE: 120 MMHG | DIASTOLIC BLOOD PRESSURE: 80 MMHG | BODY MASS INDEX: 35.09 KG/M2 | WEIGHT: 245.13 LBS | HEART RATE: 79 BPM | TEMPERATURE: 98 F | HEIGHT: 70 IN

## 2024-08-22 DIAGNOSIS — M79.672 CHRONIC PAIN OF BOTH FEET: Primary | ICD-10-CM

## 2024-08-22 DIAGNOSIS — M79.671 CHRONIC PAIN OF BOTH FEET: Primary | ICD-10-CM

## 2024-08-22 DIAGNOSIS — G89.29 CHRONIC PAIN OF BOTH FEET: Primary | ICD-10-CM

## 2024-08-22 PROCEDURE — 3074F SYST BP LT 130 MM HG: CPT | Mod: CPTII,S$GLB,, | Performed by: STUDENT IN AN ORGANIZED HEALTH CARE EDUCATION/TRAINING PROGRAM

## 2024-08-22 PROCEDURE — 99999 PR PBB SHADOW E&M-EST. PATIENT-LVL III: CPT | Mod: PBBFAC,,, | Performed by: STUDENT IN AN ORGANIZED HEALTH CARE EDUCATION/TRAINING PROGRAM

## 2024-08-22 PROCEDURE — 3008F BODY MASS INDEX DOCD: CPT | Mod: CPTII,S$GLB,, | Performed by: STUDENT IN AN ORGANIZED HEALTH CARE EDUCATION/TRAINING PROGRAM

## 2024-08-22 PROCEDURE — 99213 OFFICE O/P EST LOW 20 MIN: CPT | Mod: S$GLB,,, | Performed by: STUDENT IN AN ORGANIZED HEALTH CARE EDUCATION/TRAINING PROGRAM

## 2024-08-22 PROCEDURE — 1159F MED LIST DOCD IN RCRD: CPT | Mod: CPTII,S$GLB,, | Performed by: STUDENT IN AN ORGANIZED HEALTH CARE EDUCATION/TRAINING PROGRAM

## 2024-08-22 PROCEDURE — 3079F DIAST BP 80-89 MM HG: CPT | Mod: CPTII,S$GLB,, | Performed by: STUDENT IN AN ORGANIZED HEALTH CARE EDUCATION/TRAINING PROGRAM

## 2024-08-22 NOTE — PROGRESS NOTES
Subjective:     Patient    Ruma Guillen is a 44 y.o. female.    Problem    05/28/24: Recently seen by Dr Barker for cluster of masses on right foot. She has a history of ganglion cysts on her wrist and foot, responded well in the past to aspiration-injection. The masses do not always hurt but in certain footwear especially they do cause pain. Also with chronic bilateral heel pain which she is managing with stretching, shoes, inserts.     06/28/24: Returns for right foot painful cluster of ganglion cysts, s/p aspiration/injection 1 month ago, discussed possible excision. There has been mild improvement in the size of one of the cystic lesions but the other is still similar in size to before the procedure and is pressing on nerves causing radiating nerve symptoms into the toes. She also has chronic bilateral heel pain currently worse on the left than the right; she stretches, wears supportive shoes and inserts. Admits to feet falling asleep sometimes.     08/22/24: Started on amitriptyline 2 months ago for nerve pain; right foot pain at heel and near cysts has completely resolved; left heel pain has drastically improved and is only occasionally present, much less intense. Also with improved sleep, no side effects. Cysts have also decreased in size.     History    History obtained from patient and review of medical records.     Past Medical History:   Diagnosis Date    Anxiety     Headache, migraine     Hx of psychiatric care     KASSIDY (iron deficiency anemia)     Psychiatric problem     Seizures     childhood seizures - last seizure >28 yrs.    Sleep difficulties     Therapy        Past Surgical History:   Procedure Laterality Date    APPENDECTOMY  03/2020    WISDOM TOOTH EXTRACTION          Objective:     Vitals  Wt Readings from Last 1 Encounters:   08/22/24 111.2 kg (245 lb 2.4 oz)     Temp Readings from Last 1 Encounters:   08/22/24 97.8 °F (36.6 °C) (Oral)     BP Readings from Last 1 Encounters:   08/22/24  120/80     Pulse Readings from Last 1 Encounters:   08/22/24 79       Dermatological Exam    Skin:  Pedal hair growth, skin color, and skin texture normal on right  Pedal hair growth, skin color, and skin texture normal on left    Nails:  All nails normal in length, thickness, color    Vascular Exam    Arteries:  Posterior tibial artery palpable on right  Dorsalis pedis artery palpable on right  Posterior tibial artery palpable on left  Dorsalis pedis artery palpable on left    Veins:  Superficial veins unremarkable on right  Superficial veins unremarkable on left    Swelling:  None on right  None on left    Neurological Exam    [place] touch test:  6/6 sites sensed, light touch intact    Provocation Sign:  - at  bilateral medial calcaneal nerves   (resolved)    Musculoskeletal Exam    Footwear:  Casual on right  Casual on left    Gait Exam:   Ambulatory Status: Ambulatory  Gait: Normal  Assistive Devices: None    Foot Progression Angle:  Normal on right  Normal on left    Right Lower Extremity Additional Findings:  Negative windlass test  Cluster of at least 2 synovial cysts dorsolateral right midfoot (less prominent and no longer tender)  Right foot and ankle function, strength, and range of motion unremarkable except as noted above.     Left Lower Extremity Additional Findings:  Negative windlass test  Left foot and ankle function, strength, and range of motion unremarkable except as noted above.    Imaging and Other Tests    Imaging:  Independently reviewed and interpreted imaging, findings are as follows: N/A     Assessment:     Encounter Diagnosis   Name Primary?    Chronic pain of both feet Yes          Plan:     I counseled the patient on her conditions, their implications and medical management.    Right foot ganglion cysts, pain: chronic stable   -Will monitor, possible excision in future.      Bilateral foot pain: chronic stable  -Continue amitriptyline.   -Continue supportive footwear that is immediately  comfortable.   -Continue supportive inserts that are immediately comfortable.   -Continue stretching regularly.       Return to clinic in 4 months, call sooner PRN.

## 2024-09-06 ENCOUNTER — OFFICE VISIT (OUTPATIENT)
Dept: PSYCHIATRY | Facility: CLINIC | Age: 44
End: 2024-09-06
Payer: COMMERCIAL

## 2024-09-06 VITALS
SYSTOLIC BLOOD PRESSURE: 117 MMHG | DIASTOLIC BLOOD PRESSURE: 63 MMHG | WEIGHT: 249.44 LBS | HEART RATE: 94 BPM | BODY MASS INDEX: 35.79 KG/M2

## 2024-09-06 DIAGNOSIS — F90.0 ADHD (ATTENTION DEFICIT HYPERACTIVITY DISORDER), INATTENTIVE TYPE: ICD-10-CM

## 2024-09-06 DIAGNOSIS — F41.9 ANXIETY: ICD-10-CM

## 2024-09-06 PROCEDURE — 99999 PR PBB SHADOW E&M-EST. PATIENT-LVL III: CPT | Mod: PBBFAC,,, | Performed by: NURSE PRACTITIONER

## 2024-09-06 RX ORDER — CITALOPRAM 20 MG/1
20 TABLET, FILM COATED ORAL DAILY
Qty: 90 TABLET | Refills: 3 | Status: SHIPPED | OUTPATIENT
Start: 2024-09-06

## 2024-09-06 RX ORDER — DEXTROAMPHETAMINE SACCHARATE, AMPHETAMINE ASPARTATE, DEXTROAMPHETAMINE SULFATE AND AMPHETAMINE SULFATE 7.5; 7.5; 7.5; 7.5 MG/1; MG/1; MG/1; MG/1
TABLET ORAL
Qty: 30 TABLET | Refills: 0 | Status: SHIPPED | OUTPATIENT
Start: 2024-10-05

## 2024-09-06 RX ORDER — DEXTROAMPHETAMINE SACCHARATE, AMPHETAMINE ASPARTATE, DEXTROAMPHETAMINE SULFATE AND AMPHETAMINE SULFATE 7.5; 7.5; 7.5; 7.5 MG/1; MG/1; MG/1; MG/1
TABLET ORAL
Qty: 30 TABLET | Refills: 0 | Status: SHIPPED | OUTPATIENT
Start: 2024-09-06

## 2024-09-06 RX ORDER — ALPRAZOLAM 0.25 MG/1
0.25 TABLET ORAL 2 TIMES DAILY PRN
Qty: 30 TABLET | Refills: 5 | Status: SHIPPED | OUTPATIENT
Start: 2024-09-06

## 2024-09-06 RX ORDER — DEXTROAMPHETAMINE SACCHARATE, AMPHETAMINE ASPARTATE, DEXTROAMPHETAMINE SULFATE AND AMPHETAMINE SULFATE 7.5; 7.5; 7.5; 7.5 MG/1; MG/1; MG/1; MG/1
TABLET ORAL
Qty: 30 TABLET | Refills: 0 | Status: SHIPPED | OUTPATIENT
Start: 2024-11-03

## 2024-09-06 NOTE — PATIENT INSTRUCTIONS
Adderall XR 30 mg 1/2 tablet twice daily(3-months Rx printed)  Continue Celexa 20 mg  daily  Continue Xanax 0.25 mg 2 x daily as needed

## 2024-09-06 NOTE — PROGRESS NOTES
Outpatient Psychiatry Follow-Up Visit (MD/NP)    9/6/2024    Clinical Status of Patient:  Outpatient (Ambulatory)    Chief Complaint:  Ruma Guillen is a 44 y.o. female who presents today for follow-up of anxiety and attention problems.  Met with patient.      Last visit was:3/15/23. Chart and  reviewed    Interval History and Content of Current Session:  Current Psychiatric Medications/changes  Switch to IR Adderall XR 30 mg 1/2 tablet twice daily(3-months Rx printed)  Continue Celexa 20 mg  daily  Continue Xanax 0.25 mg 2 x daily as needed      Pt presents bright affect and euthymic mood. Pt reports effective results from medications and denies side effects.   Denies unmanaged symptoms of depression, anxiety, or ADHD. Denies SI/HI/AVH.  Will continue medications.      Psychotherapy:  Target symptoms: distractability, anxiety , work stress  Why chosen therapy is appropriate versus another modality: relevant to diagnosis  Outcome monitoring methods: self-report  Therapeutic intervention type: behavior modifying psychotherapy  Topics discussed/themes: building skills sets for symptom management, symptom recognition  The patient's response to the intervention is accepting. The patient's progress toward treatment goals is good.   Duration of intervention: 11 minutes.    Review of Systems   PSYCHIATRIC: Pertinant items are noted in the narrative.  CONSTITUTIONAL: No weight gain or loss.   MUSCULOSKELETAL: No pain or stiffness of the joints.  NEUROLOGIC: No weakness, sensory changes, seizures, confusion, memory loss, tremor or other abnormal movements.  ENDOCRINE: No polydipsia or polyuria.  INTEGUMENTARY: No rashes or lacerations.  EYES: No exophthalmos, jaundice or blindness.  ENT: No dizziness, tinnitus or hearing loss.  RESPIRATORY: No shortness of breath.  CARDIOVASCULAR: No tachycardia or chest pain.  GASTROINTESTINAL: No nausea, vomiting, pain, constipation or diarrhea.  GENITOURINARY: No frequency, dysuria or  sexual dysfunction.  HEMATOLOGIC/LYMPHATIC: No excessive bleeding, prolonged or excessive bleeding after dental extraction/injury.  ALLERGIC/IMMUNOLOGIC: No allergic response to materials, foods or animals at this time.    Past Medical, Family and Social History: The patient's past medical, family and social history have been reviewed and updated as appropriate within the electronic medical record - see encounter notes.    Compliance: yes    Side effects: None    Risk Parameters:  Patient reports no suicidal ideation  Patient reports no homicidal ideation  Patient reports no self-injurious behavior  Patient reports no violent behavior    Exam (detailed: at least 9 elements; comprehensive: all 15 elements)   Constitutional  Vitals:  Most recent vital signs, dated greater than 90 days prior to this appointment, were reviewed.   Vitals:    09/06/24 1512   BP: 117/63   Pulse: 94   Weight: 113.2 kg (249 lb 7.2 oz)        General:  unremarkable, age appropriate     Musculoskeletal  Muscle Strength/Tone:  no tremor, no tic   Gait & Station:  non-ataxic     Psychiatric  Speech:  no latency; no press   Mood & Affect:  euthymic  congruent and appropriate   Thought Process:  normal and logical   Associations:  intact   Thought Content:  normal, no suicidality, no homicidality, delusions, or paranoia   Insight:  intact   Judgement: behavior is adequate to circumstances   Orientation:  grossly intact   Memory: intact for content of interview   Language: grossly intact   Attention Span & Concentration:  able to focus   Fund of Knowledge:  intact and appropriate to age and level of education     Assessment and Diagnosis   Status/Progress: Based on the examination today, the patient's problem(s) is/are improved and adequately but not ideally controlled.  New problems have not been presented today.   Co-morbidities and Lack of compliance are not complicating management of the primary condition.  There are no active rule-out  diagnoses for this patient at this time.     General Impression:       ICD-10-CM ICD-9-CM   1. ADHD (attention deficit hyperactivity disorder), inattentive type  F90.0 314.00   2. Anxiety  F41.9 300.00     Intervention/Counseling/Treatment Plan   Medication Management: The risks and benefits of medication were discussed with the patient.  Adderall XR 30 mg 1/2 tablet twice daily(3-months Rx printed)  Continue Celexa 20 mg  daily  Continue Xanax 0.25 mg 2 x daily as needed    Return to Clinic: 3 months    Risks, benefits, side effects and alternative treatments discussed with patient. Patient agrees with the current plan as documented.  Encouraged Patient to keep future appointments.  Take medications as prescribed and abstain from substance abuse.  Pt to present to ED for thoughts to harm herself or others

## 2025-01-14 ENCOUNTER — OFFICE VISIT (OUTPATIENT)
Dept: PSYCHIATRY | Facility: CLINIC | Age: 45
End: 2025-01-14
Payer: COMMERCIAL

## 2025-01-14 DIAGNOSIS — F41.9 ANXIETY: ICD-10-CM

## 2025-01-14 DIAGNOSIS — F90.0 ADHD (ATTENTION DEFICIT HYPERACTIVITY DISORDER), INATTENTIVE TYPE: ICD-10-CM

## 2025-01-14 PROCEDURE — 98006 SYNCH AUDIO-VIDEO EST MOD 30: CPT | Mod: 95,,, | Performed by: NURSE PRACTITIONER

## 2025-01-14 RX ORDER — DEXTROAMPHETAMINE SACCHARATE, AMPHETAMINE ASPARTATE, DEXTROAMPHETAMINE SULFATE AND AMPHETAMINE SULFATE 7.5; 7.5; 7.5; 7.5 MG/1; MG/1; MG/1; MG/1
TABLET ORAL
Qty: 30 TABLET | Refills: 0 | Status: SHIPPED | OUTPATIENT
Start: 2025-03-11

## 2025-01-14 RX ORDER — ALPRAZOLAM 0.25 MG/1
0.25 TABLET ORAL 2 TIMES DAILY PRN
Qty: 30 TABLET | Refills: 5 | Status: SHIPPED | OUTPATIENT
Start: 2025-01-14

## 2025-01-14 RX ORDER — DEXTROAMPHETAMINE SACCHARATE, AMPHETAMINE ASPARTATE, DEXTROAMPHETAMINE SULFATE AND AMPHETAMINE SULFATE 7.5; 7.5; 7.5; 7.5 MG/1; MG/1; MG/1; MG/1
TABLET ORAL
Qty: 30 TABLET | Refills: 0 | Status: SHIPPED | OUTPATIENT
Start: 2025-01-14

## 2025-01-14 RX ORDER — CITALOPRAM 20 MG/1
20 TABLET, FILM COATED ORAL DAILY
Qty: 90 TABLET | Refills: 3 | Status: SHIPPED | OUTPATIENT
Start: 2025-01-14

## 2025-01-14 RX ORDER — DEXTROAMPHETAMINE SACCHARATE, AMPHETAMINE ASPARTATE, DEXTROAMPHETAMINE SULFATE AND AMPHETAMINE SULFATE 7.5; 7.5; 7.5; 7.5 MG/1; MG/1; MG/1; MG/1
TABLET ORAL
Qty: 30 TABLET | Refills: 0 | Status: SHIPPED | OUTPATIENT
Start: 2025-02-12

## 2025-01-14 NOTE — PROGRESS NOTES
Outpatient Psychiatry Follow-Up Visit (MD/NP)    1/14/2025    Clinical Status of Patient:  Outpatient (Ambulatory)    Chief Complaint:  Ruma Guillen is a 44 y.o. female who presents today for follow-up of anxiety and attention problems.  Met with patient.      Last visit was:3/15/23. Chart and  reviewed  The patient location is:home  The chief complaint leading to consultation is:  anxiety and attention problems    Visit type: audiovisual    Face to Face time with patient: 30 minutes  35 minutes of total time spent on the encounter, which includes face to face time and non-face to face time preparing to see the patient (eg, review of tests), Obtaining and/or reviewing separately obtained history, Documenting clinical information in the electronic or other health record, Independently interpreting results (not separately reported) and communicating results to the patient/family/caregiver, or Care coordination (not separately reported).     Each patient to whom he or she provides medical services by telemedicine is:  (1) informed of the relationship between the physician and patient and the respective role of any other health care provider with respect to management of the patient; and (2) notified that he or she may decline to receive medical services by telemedicine and may withdraw from such care at any time.    Interval History and Content of Current Session:  Current Psychiatric Medications/changes  Switch to IR Adderall XR 30 mg 1/2 tablet twice daily(3-months Rx printed)  Continue Celexa 20 mg  daily  Continue Xanax 0.25 mg 2 x daily as needed    Virtual Visit: Pt presents bright affect and euthymic mood. Pt reports effective results from medications and denies side effects.   Denies unmanaged symptoms of depression, anxiety, or ADHD. Denies SI/HI/AVH.  Will continue medications.      Psychotherapy:  Target symptoms: distractability, anxiety , work stress  Why chosen therapy is appropriate versus another  modality: relevant to diagnosis  Outcome monitoring methods: self-report  Therapeutic intervention type: behavior modifying psychotherapy  Topics discussed/themes: building skills sets for symptom management, symptom recognition  The patient's response to the intervention is accepting. The patient's progress toward treatment goals is good.   Duration of intervention: 11 minutes.    Review of Systems   PSYCHIATRIC: Pertinant items are noted in the narrative.  CONSTITUTIONAL: No weight gain or loss.   MUSCULOSKELETAL: No pain or stiffness of the joints.  NEUROLOGIC: No weakness, sensory changes, seizures, confusion, memory loss, tremor or other abnormal movements.  ENDOCRINE: No polydipsia or polyuria.  INTEGUMENTARY: No rashes or lacerations.  EYES: No exophthalmos, jaundice or blindness.  ENT: No dizziness, tinnitus or hearing loss.  RESPIRATORY: No shortness of breath.  CARDIOVASCULAR: No tachycardia or chest pain.  GASTROINTESTINAL: No nausea, vomiting, pain, constipation or diarrhea.  GENITOURINARY: No frequency, dysuria or sexual dysfunction.  HEMATOLOGIC/LYMPHATIC: No excessive bleeding, prolonged or excessive bleeding after dental extraction/injury.  ALLERGIC/IMMUNOLOGIC: No allergic response to materials, foods or animals at this time.    Past Medical, Family and Social History: The patient's past medical, family and social history have been reviewed and updated as appropriate within the electronic medical record - see encounter notes.    Compliance: yes    Side effects: None    Risk Parameters:  Patient reports no suicidal ideation  Patient reports no homicidal ideation  Patient reports no self-injurious behavior  Patient reports no violent behavior    Exam (detailed: at least 9 elements; comprehensive: all 15 elements)   Constitutional  Vitals:  Most recent vital signs, dated greater than 90 days prior to this appointment, were reviewed.   There were no vitals filed for this visit.       General:   unremarkable, age appropriate     Musculoskeletal  Muscle Strength/Tone:  no tremor, no tic   Gait & Station:  non-ataxic     Psychiatric  Speech:  no latency; no press   Mood & Affect:  euthymic  congruent and appropriate   Thought Process:  normal and logical   Associations:  intact   Thought Content:  normal, no suicidality, no homicidality, delusions, or paranoia   Insight:  intact   Judgement: behavior is adequate to circumstances   Orientation:  grossly intact   Memory: intact for content of interview   Language: grossly intact   Attention Span & Concentration:  able to focus   Fund of Knowledge:  intact and appropriate to age and level of education     Assessment and Diagnosis   Status/Progress: Based on the examination today, the patient's problem(s) is/are improved and adequately but not ideally controlled.  New problems have not been presented today.   Co-morbidities and Lack of compliance are not complicating management of the primary condition.  There are no active rule-out diagnoses for this patient at this time.     General Impression:       ICD-10-CM ICD-9-CM   1. ADHD (attention deficit hyperactivity disorder), inattentive type  F90.0 314.00   2. Anxiety  F41.9 300.00       Intervention/Counseling/Treatment Plan   Medication Management: The risks and benefits of medication were discussed with the patient.  Adderall XR 30 mg 1/2 tablet twice daily(3-months Rx printed)  Continue Celexa 20 mg  daily  Continue Xanax 0.25 mg 2 x daily as needed    Return to Clinic: 3 months    Risks, benefits, side effects and alternative treatments discussed with patient. Patient agrees with the current plan as documented.  Encouraged Patient to keep future appointments.  Take medications as prescribed and abstain from substance abuse.  Pt to present to ED for thoughts to harm herself or others

## 2025-04-02 ENCOUNTER — OFFICE VISIT (OUTPATIENT)
Dept: PRIMARY CARE CLINIC | Facility: CLINIC | Age: 45
End: 2025-04-02
Payer: COMMERCIAL

## 2025-04-02 VITALS
OXYGEN SATURATION: 98 % | HEIGHT: 70 IN | SYSTOLIC BLOOD PRESSURE: 124 MMHG | HEART RATE: 76 BPM | WEIGHT: 254.19 LBS | BODY MASS INDEX: 36.39 KG/M2 | DIASTOLIC BLOOD PRESSURE: 76 MMHG

## 2025-04-02 DIAGNOSIS — Z12.83 SKIN CANCER SCREENING: ICD-10-CM

## 2025-04-02 DIAGNOSIS — F90.0 ADHD (ATTENTION DEFICIT HYPERACTIVITY DISORDER), INATTENTIVE TYPE: ICD-10-CM

## 2025-04-02 DIAGNOSIS — Z86.2 HISTORY OF IRON DEFICIENCY ANEMIA: ICD-10-CM

## 2025-04-02 DIAGNOSIS — Z00.00 ANNUAL PHYSICAL EXAM: Primary | ICD-10-CM

## 2025-04-02 PROCEDURE — 3074F SYST BP LT 130 MM HG: CPT | Mod: CPTII,S$GLB,, | Performed by: STUDENT IN AN ORGANIZED HEALTH CARE EDUCATION/TRAINING PROGRAM

## 2025-04-02 PROCEDURE — 1159F MED LIST DOCD IN RCRD: CPT | Mod: CPTII,S$GLB,, | Performed by: STUDENT IN AN ORGANIZED HEALTH CARE EDUCATION/TRAINING PROGRAM

## 2025-04-02 PROCEDURE — 99999 PR PBB SHADOW E&M-EST. PATIENT-LVL IV: CPT | Mod: PBBFAC,,, | Performed by: STUDENT IN AN ORGANIZED HEALTH CARE EDUCATION/TRAINING PROGRAM

## 2025-04-02 PROCEDURE — 3044F HG A1C LEVEL LT 7.0%: CPT | Mod: CPTII,S$GLB,, | Performed by: STUDENT IN AN ORGANIZED HEALTH CARE EDUCATION/TRAINING PROGRAM

## 2025-04-02 PROCEDURE — 3008F BODY MASS INDEX DOCD: CPT | Mod: CPTII,S$GLB,, | Performed by: STUDENT IN AN ORGANIZED HEALTH CARE EDUCATION/TRAINING PROGRAM

## 2025-04-02 PROCEDURE — 3078F DIAST BP <80 MM HG: CPT | Mod: CPTII,S$GLB,, | Performed by: STUDENT IN AN ORGANIZED HEALTH CARE EDUCATION/TRAINING PROGRAM

## 2025-04-02 PROCEDURE — 1160F RVW MEDS BY RX/DR IN RCRD: CPT | Mod: CPTII,S$GLB,, | Performed by: STUDENT IN AN ORGANIZED HEALTH CARE EDUCATION/TRAINING PROGRAM

## 2025-04-02 PROCEDURE — 99396 PREV VISIT EST AGE 40-64: CPT | Mod: S$GLB,,, | Performed by: STUDENT IN AN ORGANIZED HEALTH CARE EDUCATION/TRAINING PROGRAM

## 2025-04-02 NOTE — PROGRESS NOTES
Office visit  Patient: Ruma Guillen   4/2/2025       Assessment/Plan:       1. Annual physical exam  -     TSH; Future; Expected date: 04/02/2025  -     Hemoglobin A1C; Future; Expected date: 04/02/2025  -     Lipid Panel; Future; Expected date: 04/02/2025  -     Comprehensive Metabolic Panel; Future; Expected date: 04/02/2025  -     CBC Auto Differential; Future; Expected date: 04/02/2025        -Discussed healthy habits and recommended preventative screening    2. ADHD (attention deficit hyperactivity disorder), inattentive type       -stable, continue current medication       -follow up with psychiatry    3. Skin cancer screening  -     Ambulatory referral/consult to Dermatology; Future; Expected date: 04/09/2025    4. History of iron deficiency anemia  -     IRON AND TIBC; Future; Expected date: 04/02/2025  -     FERRITIN; Future; Expected date: 04/02/2025      Return to clinic in 1 year or sooner as needed.          CHIEF COMPLAINT: annual physical    HPI: Ruma Guillen is a 44 y.o. female who presents for an annual physical.  She has no complaints.  She sees psychiatry for ADHD.    Review of Systems   HENT:  Negative for hearing loss.    Eyes:  Negative for discharge.   Respiratory:  Negative for wheezing.    Cardiovascular:  Negative for chest pain and palpitations.   Gastrointestinal:  Negative for blood in stool, constipation, diarrhea and vomiting.   Genitourinary:  Negative for dysuria and hematuria.   Musculoskeletal:  Negative for neck pain.   Neurological:  Negative for weakness and headaches.   Endo/Heme/Allergies:  Negative for polydipsia.     Answers submitted by the patient for this visit:  Review of Systems Questionnaire (Submitted on 4/2/2025)  activity change: No  unexpected weight change: No  rhinorrhea: No  trouble swallowing: No  visual disturbance: No  chest tightness: No  polyuria: No  difficulty urinating: No  menstrual problem: No  joint swelling: No  arthralgias: No  confusion: No  dysphoric  "mood: No    Current Outpatient Medications   Medication Instructions    ALPRAZolam (XANAX) 0.25 mg, Oral, 2 times daily PRN    amitriptyline (ELAVIL) 25 mg, Oral, Nightly    aspirin-acetaminophen-caffeine 250-250-65 mg (EXCEDRIN MIGRAINE) 250-250-65 mg per tablet 1 tablet, Every 6 hours PRN    cetirizine (ZYRTEC) 10 mg, Daily    citalopram (CELEXA) 20 mg, Oral, Daily    dextroamphetamine-amphetamine 30 mg Tab Take 0.5 tablet by mouth twice daily    dextroamphetamine-amphetamine 30 mg Tab Take 0.5 tablet by mouth twice daily    dextroamphetamine-amphetamine 30 mg Tab Take 0.5 tablet by mouth twice daily       Lab Results   Component Value Date    HGBA1C 5.2 04/04/2025    HGBA1C 5.2 11/14/2023    HGBA1C 5.0 11/08/2021     No results found for: "MICALBCREAT"  Lab Results   Component Value Date    LDLCALC 155.8 11/14/2023    LDLCALC 151.0 11/08/2021    CHOL 191 04/04/2025    HDL 46 04/04/2025    TRIG 98 04/04/2025       Lab Results   Component Value Date     04/04/2025    K 4.3 04/04/2025     04/04/2025    CO2 26 04/04/2025    GLU 84 11/14/2023    BUN 13 04/04/2025    CREATININE 0.8 04/04/2025    CALCIUM 9.3 04/04/2025    PROT 7.6 11/14/2023    ALBUMIN 4.0 04/04/2025    BILITOT 0.4 04/04/2025    ALKPHOS 64 04/04/2025    AST 14 04/04/2025    ALT 16 04/04/2025    ANIONGAP 6 (L) 04/04/2025    ESTGFRAFRICA >60.0 11/08/2021    EGFRNONAA >60.0 11/08/2021    WBC 6.07 04/04/2025    HGB 13.2 04/04/2025    HGB 13.6 11/14/2023    HCT 41.3 04/04/2025    MCV 86 04/04/2025     04/04/2025    TSH 1.050 04/04/2025    HEPCAB Negative 09/21/2015       Lab Results   Component Value Date    OESNZACO18NN 30 02/22/2019    TKBYZGPT82 325 06/28/2016    FERRITIN 23.0 04/04/2025    IRON 75 04/04/2025    TRANSFERRIN 301 04/04/2025    TIBC 445 04/04/2025    FESATURATED 15 (L) 11/14/2023         Past Medical History:   Diagnosis Date    Anxiety     Headache, migraine     Hx of psychiatric care     KASSIDY (iron deficiency anemia)     " "Psychiatric problem     Seizures     childhood seizures - last seizure >28 yrs.    Sleep difficulties     Therapy      Past Surgical History:   Procedure Laterality Date    APPENDECTOMY  03/2020    WISDOM TOOTH EXTRACTION         Social History[1]    family history includes Arthritis (age of onset: 1) in her mother; Cancer in her maternal grandfather; Depression in her maternal grandmother; Diabetes in her maternal grandfather; Heart disease in her maternal grandfather; Irritable bowel syndrome in her mother; Mental illness in her mother; Miscarriages / Stillbirths in her mother; Ovarian cancer in her maternal grandmother and mother; Pacemaker/defibrilator in her maternal grandmother; Rheum arthritis in her mother.    Vitals:    04/02/25 1514   BP: 124/76   Pulse: 76   SpO2: 98%   Weight: 115.3 kg (254 lb 3.1 oz)   Height: 5' 10" (1.778 m)   PainSc: 0-No pain       Body mass index is 36.47 kg/m².      Objective:   Physical Exam  Vitals reviewed.   Constitutional:       General: She is not in acute distress.     Appearance: Normal appearance. She is well-developed.   HENT:      Head: Normocephalic and atraumatic.      Right Ear: External ear normal.      Left Ear: External ear normal.      Nose: Nose normal.      Mouth/Throat:      Mouth: Mucous membranes are moist.   Eyes:      General: No scleral icterus.        Right eye: No discharge.         Left eye: No discharge.      Conjunctiva/sclera: Conjunctivae normal.   Cardiovascular:      Rate and Rhythm: Normal rate and regular rhythm.      Heart sounds: Normal heart sounds. No murmur heard.     No friction rub. No gallop.   Pulmonary:      Effort: Pulmonary effort is normal. No respiratory distress.      Breath sounds: Normal breath sounds. No wheezing, rhonchi or rales.   Abdominal:      General: Abdomen is flat. Bowel sounds are normal. There is no distension.      Palpations: Abdomen is soft. There is no mass.      Tenderness: There is no abdominal tenderness. " There is no guarding or rebound.      Hernia: No hernia is present.   Musculoskeletal:         General: No deformity.      Right lower leg: No edema.      Left lower leg: No edema.   Skin:     General: Skin is warm and dry.   Neurological:      General: No focal deficit present.      Mental Status: She is alert and oriented to person, place, and time.   Psychiatric:         Mood and Affect: Mood normal.         Behavior: Behavior normal.         Thought Content: Thought content normal.             Peace Dangelo MD  Internal Medicine and Pediatrics                                 [1]   Social History  Tobacco Use    Smoking status: Former     Current packs/day: 0.25     Average packs/day: 0.3 packs/day for 5.2 years (1.3 ttl pk-yrs)     Types: Cigarettes    Smokeless tobacco: Never    Tobacco comments:     monthly - 2-3 cigarettes   Substance Use Topics    Alcohol use: Not Currently    Drug use: No

## 2025-04-04 ENCOUNTER — HOSPITAL ENCOUNTER (OUTPATIENT)
Dept: RADIOLOGY | Facility: HOSPITAL | Age: 45
Discharge: HOME | End: 2025-04-04
Attending: STUDENT IN AN ORGANIZED HEALTH CARE EDUCATION/TRAINING PROGRAM
Payer: COMMERCIAL

## 2025-04-04 ENCOUNTER — LAB VISIT (OUTPATIENT)
Dept: LAB | Facility: HOSPITAL | Age: 45
End: 2025-04-04
Attending: STUDENT IN AN ORGANIZED HEALTH CARE EDUCATION/TRAINING PROGRAM
Payer: COMMERCIAL

## 2025-04-04 VITALS — BODY MASS INDEX: 36.36 KG/M2 | WEIGHT: 254 LBS | HEIGHT: 70 IN

## 2025-04-04 DIAGNOSIS — Z12.31 ENCOUNTER FOR SCREENING MAMMOGRAM FOR BREAST CANCER: ICD-10-CM

## 2025-04-04 DIAGNOSIS — Z86.2 HISTORY OF IRON DEFICIENCY ANEMIA: ICD-10-CM

## 2025-04-04 DIAGNOSIS — Z00.00 ANNUAL PHYSICAL EXAM: ICD-10-CM

## 2025-04-04 LAB
ABSOLUTE EOSINOPHIL (OHS): 0.13 K/UL
ABSOLUTE MONOCYTE (OHS): 0.43 K/UL (ref 0.3–1)
ABSOLUTE NEUTROPHIL COUNT (OHS): 3.63 K/UL (ref 1.8–7.7)
ALBUMIN SERPL BCP-MCNC: 4 G/DL (ref 3.5–5.2)
ALP SERPL-CCNC: 64 UNIT/L (ref 40–150)
ALT SERPL W/O P-5'-P-CCNC: 16 UNIT/L (ref 10–44)
ANION GAP (OHS): 6 MMOL/L (ref 8–16)
AST SERPL-CCNC: 14 UNIT/L (ref 11–45)
BASOPHILS # BLD AUTO: 0.03 K/UL
BASOPHILS NFR BLD AUTO: 0.5 %
BILIRUB SERPL-MCNC: 0.4 MG/DL (ref 0.1–1)
BUN SERPL-MCNC: 13 MG/DL (ref 6–20)
CALCIUM SERPL-MCNC: 9.3 MG/DL (ref 8.7–10.5)
CHLORIDE SERPL-SCNC: 105 MMOL/L (ref 95–110)
CHOLEST SERPL-MCNC: 191 MG/DL (ref 120–199)
CHOLEST/HDLC SERPL: 4.2 {RATIO} (ref 2–5)
CO2 SERPL-SCNC: 26 MMOL/L (ref 23–29)
CREAT SERPL-MCNC: 0.8 MG/DL (ref 0.5–1.4)
EAG (OHS): 103 MG/DL (ref 68–131)
ERYTHROCYTE [DISTWIDTH] IN BLOOD BY AUTOMATED COUNT: 12.7 % (ref 11.5–14.5)
FERRITIN SERPL-MCNC: 23 NG/ML (ref 20–300)
GFR SERPLBLD CREATININE-BSD FMLA CKD-EPI: >60 ML/MIN/1.73/M2
GLUCOSE SERPL-MCNC: 104 MG/DL (ref 70–110)
HBA1C MFR BLD: 5.2 % (ref 4–5.6)
HCT VFR BLD AUTO: 41.3 % (ref 37–48.5)
HDLC SERPL-MCNC: 46 MG/DL (ref 40–75)
HDLC SERPL: 24.1 % (ref 20–50)
HGB BLD-MCNC: 13.2 GM/DL (ref 12–16)
IMM GRANULOCYTES # BLD AUTO: 0.03 K/UL (ref 0–0.04)
IMM GRANULOCYTES NFR BLD AUTO: 0.5 % (ref 0–0.5)
IRON SATN MFR SERPL: 17 % (ref 20–50)
IRON SERPL-MCNC: 75 UG/DL (ref 30–160)
LDLC SERPL CALC-MCNC: 125.4 MG/DL (ref 63–159)
LYMPHOCYTES # BLD AUTO: 1.82 K/UL (ref 1–4.8)
MCH RBC QN AUTO: 27.6 PG (ref 27–31)
MCHC RBC AUTO-ENTMCNC: 32 G/DL (ref 32–36)
MCV RBC AUTO: 86 FL (ref 82–98)
NONHDLC SERPL-MCNC: 145 MG/DL
NUCLEATED RBC (/100WBC) (OHS): 0 /100 WBC
PLATELET # BLD AUTO: 275 K/UL (ref 150–450)
PMV BLD AUTO: 10.4 FL (ref 9.2–12.9)
POTASSIUM SERPL-SCNC: 4.3 MMOL/L (ref 3.5–5.1)
PROT SERPL-MCNC: 7.5 GM/DL (ref 6–8.4)
RBC # BLD AUTO: 4.78 M/UL (ref 4–5.4)
RELATIVE EOSINOPHIL (OHS): 2.1 %
RELATIVE LYMPHOCYTE (OHS): 30 % (ref 18–48)
RELATIVE MONOCYTE (OHS): 7.1 % (ref 4–15)
RELATIVE NEUTROPHIL (OHS): 59.8 % (ref 38–73)
SODIUM SERPL-SCNC: 137 MMOL/L (ref 136–145)
TIBC SERPL-MCNC: 445 UG/DL (ref 250–450)
TRANSFERRIN SERPL-MCNC: 301 MG/DL (ref 200–375)
TRIGL SERPL-MCNC: 98 MG/DL (ref 30–150)
TSH SERPL-ACNC: 1.05 UIU/ML (ref 0.4–4)
WBC # BLD AUTO: 6.07 K/UL (ref 3.9–12.7)

## 2025-04-04 PROCEDURE — 36415 COLL VENOUS BLD VENIPUNCTURE: CPT | Mod: PN

## 2025-04-04 PROCEDURE — 83036 HEMOGLOBIN GLYCOSYLATED A1C: CPT

## 2025-04-04 PROCEDURE — 77067 SCR MAMMO BI INCL CAD: CPT | Mod: 26,,, | Performed by: RADIOLOGY

## 2025-04-04 PROCEDURE — 77063 BREAST TOMOSYNTHESIS BI: CPT | Mod: TC

## 2025-04-04 PROCEDURE — 83540 ASSAY OF IRON: CPT

## 2025-04-04 PROCEDURE — 84460 ALANINE AMINO (ALT) (SGPT): CPT

## 2025-04-04 PROCEDURE — 85025 COMPLETE CBC W/AUTO DIFF WBC: CPT

## 2025-04-04 PROCEDURE — 82728 ASSAY OF FERRITIN: CPT

## 2025-04-04 PROCEDURE — 77063 BREAST TOMOSYNTHESIS BI: CPT | Mod: 26,,, | Performed by: RADIOLOGY

## 2025-04-04 PROCEDURE — 84443 ASSAY THYROID STIM HORMONE: CPT

## 2025-04-04 PROCEDURE — 80061 LIPID PANEL: CPT

## 2025-04-07 ENCOUNTER — RESULTS FOLLOW-UP (OUTPATIENT)
Dept: PRIMARY CARE CLINIC | Facility: CLINIC | Age: 45
End: 2025-04-07

## 2025-04-08 ENCOUNTER — RESULTS FOLLOW-UP (OUTPATIENT)
Dept: PRIMARY CARE CLINIC | Facility: CLINIC | Age: 45
End: 2025-04-08

## 2025-05-01 ENCOUNTER — OFFICE VISIT (OUTPATIENT)
Dept: PSYCHIATRY | Facility: CLINIC | Age: 45
End: 2025-05-01
Payer: COMMERCIAL

## 2025-05-01 DIAGNOSIS — F41.9 ANXIETY: ICD-10-CM

## 2025-05-01 DIAGNOSIS — F90.0 ADHD (ATTENTION DEFICIT HYPERACTIVITY DISORDER), INATTENTIVE TYPE: ICD-10-CM

## 2025-05-01 PROCEDURE — 3044F HG A1C LEVEL LT 7.0%: CPT | Mod: CPTII,95,, | Performed by: NURSE PRACTITIONER

## 2025-05-01 PROCEDURE — 98006 SYNCH AUDIO-VIDEO EST MOD 30: CPT | Mod: 95,,, | Performed by: NURSE PRACTITIONER

## 2025-05-01 RX ORDER — DEXTROAMPHETAMINE SACCHARATE, AMPHETAMINE ASPARTATE, DEXTROAMPHETAMINE SULFATE AND AMPHETAMINE SULFATE 7.5; 7.5; 7.5; 7.5 MG/1; MG/1; MG/1; MG/1
TABLET ORAL
Qty: 30 TABLET | Refills: 0 | Status: SHIPPED | OUTPATIENT
Start: 2025-05-01

## 2025-05-01 RX ORDER — DEXTROAMPHETAMINE SACCHARATE, AMPHETAMINE ASPARTATE, DEXTROAMPHETAMINE SULFATE AND AMPHETAMINE SULFATE 7.5; 7.5; 7.5; 7.5 MG/1; MG/1; MG/1; MG/1
TABLET ORAL
Qty: 30 TABLET | Refills: 0 | Status: SHIPPED | OUTPATIENT
Start: 2025-05-31

## 2025-05-01 RX ORDER — DEXTROAMPHETAMINE SACCHARATE, AMPHETAMINE ASPARTATE, DEXTROAMPHETAMINE SULFATE AND AMPHETAMINE SULFATE 7.5; 7.5; 7.5; 7.5 MG/1; MG/1; MG/1; MG/1
TABLET ORAL
Qty: 30 TABLET | Refills: 0 | Status: SHIPPED | OUTPATIENT
Start: 2025-06-30

## 2025-05-01 RX ORDER — CITALOPRAM 20 MG/1
20 TABLET, FILM COATED ORAL DAILY
Qty: 90 TABLET | Refills: 3 | Status: SHIPPED | OUTPATIENT
Start: 2025-05-01

## 2025-05-01 RX ORDER — ALPRAZOLAM 0.25 MG/1
0.25 TABLET ORAL 2 TIMES DAILY PRN
Qty: 30 TABLET | Refills: 5 | Status: SHIPPED | OUTPATIENT
Start: 2025-05-01

## 2025-05-01 NOTE — PROGRESS NOTES
Outpatient Psychiatry Follow-Up Visit (MD/NP)    5/1/2025    Clinical Status of Patient:  Outpatient (Ambulatory)    Chief Complaint:  Ruma Guillen is a 44 y.o. female who presents today for follow-up of anxiety and attention problems.  Met with patient.      Last visit was:3/15/23. Chart and  reviewed  The patient location is:home  The chief complaint leading to consultation is:  anxiety and attention problems    Visit type: audiovisual    Face to Face time with patient: 30 minutes  35 minutes of total time spent on the encounter, which includes face to face time and non-face to face time preparing to see the patient (eg, review of tests), Obtaining and/or reviewing separately obtained history, Documenting clinical information in the electronic or other health record, Independently interpreting results (not separately reported) and communicating results to the patient/family/caregiver, or Care coordination (not separately reported).     Each patient to whom he or she provides medical services by telemedicine is:  (1) informed of the relationship between the physician and patient and the respective role of any other health care provider with respect to management of the patient; and (2) notified that he or she may decline to receive medical services by telemedicine and may withdraw from such care at any time.    Interval History and Content of Current Session:  Current Psychiatric Medications/changes  Switch to IR Adderall XR 30 mg 1/2 tablet twice daily(3-months Rx printed)  Continue Celexa 20 mg  daily  Continue Xanax 0.25 mg 2 x daily as needed    Virtual Visit: Pt presents bright affect and euthymic mood. Pt reports effective results from medications and denies side effects.   Denies unmanaged symptoms of depression, anxiety, or ADHD. Denies SI/HI/AVH.  Will continue medications.      Psychotherapy:  Target symptoms: distractability, anxiety , work stress  Why chosen therapy is appropriate versus another  modality: relevant to diagnosis  Outcome monitoring methods: self-report  Therapeutic intervention type: behavior modifying psychotherapy  Topics discussed/themes: building skills sets for symptom management, symptom recognition  The patient's response to the intervention is accepting. The patient's progress toward treatment goals is good.   Duration of intervention: 11 minutes.    Review of Systems   PSYCHIATRIC: Pertinant items are noted in the narrative.  CONSTITUTIONAL: No weight gain or loss.   MUSCULOSKELETAL: No pain or stiffness of the joints.  NEUROLOGIC: No weakness, sensory changes, seizures, confusion, memory loss, tremor or other abnormal movements.  ENDOCRINE: No polydipsia or polyuria.  INTEGUMENTARY: No rashes or lacerations.  EYES: No exophthalmos, jaundice or blindness.  ENT: No dizziness, tinnitus or hearing loss.  RESPIRATORY: No shortness of breath.  CARDIOVASCULAR: No tachycardia or chest pain.  GASTROINTESTINAL: No nausea, vomiting, pain, constipation or diarrhea.  GENITOURINARY: No frequency, dysuria or sexual dysfunction.  HEMATOLOGIC/LYMPHATIC: No excessive bleeding, prolonged or excessive bleeding after dental extraction/injury.  ALLERGIC/IMMUNOLOGIC: No allergic response to materials, foods or animals at this time.    Past Medical, Family and Social History: The patient's past medical, family and social history have been reviewed and updated as appropriate within the electronic medical record - see encounter notes.    Compliance: yes    Side effects: None    Risk Parameters:  Patient reports no suicidal ideation  Patient reports no homicidal ideation  Patient reports no self-injurious behavior  Patient reports no violent behavior    Exam (detailed: at least 9 elements; comprehensive: all 15 elements)   Constitutional  Vitals:  Most recent vital signs, dated greater than 90 days prior to this appointment, were reviewed.   There were no vitals filed for this visit.       General:   unremarkable, age appropriate     Musculoskeletal  Muscle Strength/Tone:  no tremor, no tic   Gait & Station:  non-ataxic     Psychiatric  Speech:  no latency; no press   Mood & Affect:  euthymic  congruent and appropriate   Thought Process:  normal and logical   Associations:  intact   Thought Content:  normal, no suicidality, no homicidality, delusions, or paranoia   Insight:  intact   Judgement: behavior is adequate to circumstances   Orientation:  grossly intact   Memory: intact for content of interview   Language: grossly intact   Attention Span & Concentration:  able to focus   Fund of Knowledge:  intact and appropriate to age and level of education     Assessment and Diagnosis   Status/Progress: Based on the examination today, the patient's problem(s) is/are improved and adequately but not ideally controlled.  New problems have not been presented today.   Co-morbidities and Lack of compliance are not complicating management of the primary condition.  There are no active rule-out diagnoses for this patient at this time.     General Impression:       ICD-10-CM ICD-9-CM   1. ADHD (attention deficit hyperactivity disorder), inattentive type  F90.0 314.00   2. Anxiety  F41.9 300.00       Intervention/Counseling/Treatment Plan   Medication Management: The risks and benefits of medication were discussed with the patient.  Adderall XR 30 mg 1/2 tablet twice daily(3-months Rx printed)  Continue Celexa 20 mg  daily  Continue Xanax 0.25 mg 2 x daily as needed    Return to Clinic: 3 months    Risks, benefits, side effects and alternative treatments discussed with patient. Patient agrees with the current plan as documented.  Encouraged Patient to keep future appointments.  Take medications as prescribed and abstain from substance abuse.  Pt to present to ED for thoughts to harm herself or others

## 2025-08-06 ENCOUNTER — OFFICE VISIT (OUTPATIENT)
Dept: PSYCHIATRY | Facility: CLINIC | Age: 45
End: 2025-08-06
Payer: COMMERCIAL

## 2025-08-06 DIAGNOSIS — F41.9 ANXIETY: ICD-10-CM

## 2025-08-06 DIAGNOSIS — F90.0 ADHD (ATTENTION DEFICIT HYPERACTIVITY DISORDER), INATTENTIVE TYPE: ICD-10-CM

## 2025-08-06 PROCEDURE — 98006 SYNCH AUDIO-VIDEO EST MOD 30: CPT | Mod: 95,,, | Performed by: NURSE PRACTITIONER

## 2025-08-06 PROCEDURE — 3044F HG A1C LEVEL LT 7.0%: CPT | Mod: CPTII,95,, | Performed by: NURSE PRACTITIONER

## 2025-08-06 RX ORDER — ALPRAZOLAM 0.25 MG/1
0.25 TABLET ORAL 2 TIMES DAILY PRN
Qty: 30 TABLET | Refills: 5 | Status: SHIPPED | OUTPATIENT
Start: 2025-08-06

## 2025-08-06 RX ORDER — DEXTROAMPHETAMINE SACCHARATE, AMPHETAMINE ASPARTATE, DEXTROAMPHETAMINE SULFATE AND AMPHETAMINE SULFATE 7.5; 7.5; 7.5; 7.5 MG/1; MG/1; MG/1; MG/1
TABLET ORAL
Qty: 30 TABLET | Refills: 0 | Status: SHIPPED | OUTPATIENT
Start: 2025-10-03

## 2025-08-06 RX ORDER — CITALOPRAM 20 MG/1
20 TABLET ORAL DAILY
Qty: 90 TABLET | Refills: 3 | Status: SHIPPED | OUTPATIENT
Start: 2025-08-06

## 2025-08-06 RX ORDER — DEXTROAMPHETAMINE SACCHARATE, AMPHETAMINE ASPARTATE, DEXTROAMPHETAMINE SULFATE AND AMPHETAMINE SULFATE 7.5; 7.5; 7.5; 7.5 MG/1; MG/1; MG/1; MG/1
TABLET ORAL
Qty: 30 TABLET | Refills: 0 | Status: SHIPPED | OUTPATIENT
Start: 2025-09-04

## 2025-08-06 RX ORDER — DEXTROAMPHETAMINE SACCHARATE, AMPHETAMINE ASPARTATE, DEXTROAMPHETAMINE SULFATE AND AMPHETAMINE SULFATE 7.5; 7.5; 7.5; 7.5 MG/1; MG/1; MG/1; MG/1
TABLET ORAL
Qty: 30 TABLET | Refills: 0 | Status: SHIPPED | OUTPATIENT
Start: 2025-08-06

## 2025-08-06 NOTE — PROGRESS NOTES
Outpatient Psychiatry Follow-Up Visit (MD/NP)    8/6/2025    Clinical Status of Patient:  Outpatient (Ambulatory)    Chief Complaint:  Ruma Guillen is a 44 y.o. female who presents today for follow-up of anxiety and attention problems.  Met with patient.      Last visit was 5/01/25. Chart and  reviewed  The patient location is:home  The chief complaint leading to consultation is:  anxiety and attention problems    Visit type: audiovisual    Face to Face time with patient: 30 minutes  35 minutes of total time spent on the encounter, which includes face to face time and non-face to face time preparing to see the patient (eg, review of tests), Obtaining and/or reviewing separately obtained history, Documenting clinical information in the electronic or other health record, Independently interpreting results (not separately reported) and communicating results to the patient/family/caregiver, or Care coordination (not separately reported).     Each patient to whom he or she provides medical services by telemedicine is:  (1) informed of the relationship between the physician and patient and the respective role of any other health care provider with respect to management of the patient; and (2) notified that he or she may decline to receive medical services by telemedicine and may withdraw from such care at any time.    Interval History and Content of Current Session:  Current Psychiatric Medications/changes  Switch to IR Adderall XR 30 mg 1/2 tablet twice daily(3-months Rx printed)  Continue Celexa 20 mg  daily  Continue Xanax 0.25 mg 2 x daily as needed    Virtual Visit: Pt presents bright affect and euthymic mood. Pt reports effective results from medications and denies side effects.   Denies unmanaged symptoms of depression, anxiety, or ADHD. Denies SI/HI/AVH.  Will continue medications.      Psychotherapy:  Target symptoms: distractability, anxiety , work stress  Why chosen therapy is appropriate versus another  modality: relevant to diagnosis  Outcome monitoring methods: self-report  Therapeutic intervention type: behavior modifying psychotherapy  Topics discussed/themes: building skills sets for symptom management, symptom recognition  The patient's response to the intervention is accepting. The patient's progress toward treatment goals is good.   Duration of intervention: 11 minutes.    Review of Systems   PSYCHIATRIC: Pertinant items are noted in the narrative.  CONSTITUTIONAL: No weight gain or loss.   MUSCULOSKELETAL: No pain or stiffness of the joints.  NEUROLOGIC: No weakness, sensory changes, seizures, confusion, memory loss, tremor or other abnormal movements.  ENDOCRINE: No polydipsia or polyuria.  INTEGUMENTARY: No rashes or lacerations.  EYES: No exophthalmos, jaundice or blindness.  ENT: No dizziness, tinnitus or hearing loss.  RESPIRATORY: No shortness of breath.  CARDIOVASCULAR: No tachycardia or chest pain.  GASTROINTESTINAL: No nausea, vomiting, pain, constipation or diarrhea.  GENITOURINARY: No frequency, dysuria or sexual dysfunction.  HEMATOLOGIC/LYMPHATIC: No excessive bleeding, prolonged or excessive bleeding after dental extraction/injury.  ALLERGIC/IMMUNOLOGIC: No allergic response to materials, foods or animals at this time.    Past Medical, Family and Social History: The patient's past medical, family and social history have been reviewed and updated as appropriate within the electronic medical record - see encounter notes.    Compliance: yes    Side effects: None    Risk Parameters:  Patient reports no suicidal ideation  Patient reports no homicidal ideation  Patient reports no self-injurious behavior  Patient reports no violent behavior    Exam (detailed: at least 9 elements; comprehensive: all 15 elements)   Constitutional  Vitals:  Most recent vital signs, dated greater than 90 days prior to this appointment, were reviewed.   There were no vitals filed for this visit.     General:   unremarkable, age appropriate     Musculoskeletal  Muscle Strength/Tone:  no tremor, no tic   Gait & Station:  non-ataxic     Psychiatric  Speech:  no latency; no press   Mood & Affect:  euthymic  congruent and appropriate   Thought Process:  normal and logical   Associations:  intact   Thought Content:  normal, no suicidality, no homicidality, delusions, or paranoia   Insight:  intact   Judgement: behavior is adequate to circumstances   Orientation:  grossly intact   Memory: intact for content of interview   Language: grossly intact   Attention Span & Concentration:  able to focus   Fund of Knowledge:  intact and appropriate to age and level of education     Assessment and Diagnosis   Status/Progress: Based on the examination today, the patient's problem(s) is/are improved and adequately but not ideally controlled.  New problems have not been presented today.   Co-morbidities and Lack of compliance are not complicating management of the primary condition.  There are no active rule-out diagnoses for this patient at this time.     General Impression:       ICD-10-CM ICD-9-CM   1. ADHD (attention deficit hyperactivity disorder), inattentive type  F90.0 314.00   2. Anxiety  F41.9 300.00       Intervention/Counseling/Treatment Plan   Medication Management: The risks and benefits of medication were discussed with the patient.  Adderall XR 30 mg 1/2 tablet twice daily(3-months Rx printed)  Continue Celexa 20 mg  daily  Continue Xanax 0.25 mg 2 x daily as needed    Return to Clinic: 3 months    Risks, benefits, side effects and alternative treatments discussed with patient. Patient agrees with the current plan as documented.  Encouraged Patient to keep future appointments.  Take medications as prescribed and abstain from substance abuse.  Pt to present to ED for thoughts to harm herself or others                [id number]}